# Patient Record
Sex: MALE | Race: WHITE | NOT HISPANIC OR LATINO | Employment: FULL TIME | URBAN - NONMETROPOLITAN AREA
[De-identification: names, ages, dates, MRNs, and addresses within clinical notes are randomized per-mention and may not be internally consistent; named-entity substitution may affect disease eponyms.]

---

## 2018-02-19 ENCOUNTER — OFFICE VISIT (OUTPATIENT)
Dept: URGENT CARE | Facility: CLINIC | Age: 38
End: 2018-02-19
Payer: COMMERCIAL

## 2018-02-19 VITALS
BODY MASS INDEX: 24.26 KG/M2 | WEIGHT: 189 LBS | DIASTOLIC BLOOD PRESSURE: 72 MMHG | TEMPERATURE: 99.8 F | RESPIRATION RATE: 18 BRPM | SYSTOLIC BLOOD PRESSURE: 122 MMHG | OXYGEN SATURATION: 96 % | HEIGHT: 74 IN

## 2018-02-19 DIAGNOSIS — J11.1 INFLUENZA: Primary | ICD-10-CM

## 2018-02-19 PROCEDURE — 99203 OFFICE O/P NEW LOW 30 MIN: CPT | Performed by: FAMILY MEDICINE

## 2018-02-19 PROCEDURE — S9088 SERVICES PROVIDED IN URGENT: HCPCS | Performed by: FAMILY MEDICINE

## 2018-02-19 RX ORDER — PREDNISONE 50 MG/1
50 TABLET ORAL DAILY
Qty: 5 TABLET | Refills: 0 | Status: SHIPPED | OUTPATIENT
Start: 2018-02-19 | End: 2018-02-24

## 2018-02-19 RX ORDER — OSELTAMIVIR PHOSPHATE 75 MG/1
75 CAPSULE ORAL EVERY 12 HOURS SCHEDULED
Qty: 10 CAPSULE | Refills: 0 | Status: SHIPPED | OUTPATIENT
Start: 2018-02-19 | End: 2018-02-24

## 2018-02-19 RX ORDER — METHYLPHENIDATE HYDROCHLORIDE 10 MG/1
5 TABLET ORAL
COMMUNITY
End: 2018-09-04 | Stop reason: ALTCHOICE

## 2018-02-19 RX ORDER — CARBAMAZEPINE 200 MG/1
200 TABLET ORAL 3 TIMES DAILY
COMMUNITY
End: 2018-09-04 | Stop reason: ALTCHOICE

## 2018-02-19 RX ORDER — FLUOXETINE 10 MG/1
10 CAPSULE ORAL DAILY
COMMUNITY
End: 2018-09-04 | Stop reason: ALTCHOICE

## 2018-02-19 NOTE — PROGRESS NOTES
[address] [employer]        NAME: Iliana Alamo is a 30 y o  male  : 1980    MRN: [id number]  DATE: 2018  TIME: 4:21 PM    Assessment and Plan   Influenza [J11 1]  1  Influenza  oseltamivir (TAMIFLU) 75 mg capsule    predniSONE 50 mg tablet         Patient Instructions     Tylenol and ibuprofen for fevers and body ache  Minimize exposure to the children  May not feel like going back to work for another 3-4 days  You should not return to work until you are fever free for 24 hours  Follow up with PCP in 3-5 days  Proceed to  ER if symptoms worsen  Chief Complaint     Chief Complaint   Patient presents with    Sore Throat     Started yesterday with sore throat nausea light headed and body aches  Patient stated prior to coming here he stopped at a gas station and when he walked in to the store he fell to the ground and blacked out for a second or two denies hitting head patient drove himself here alone  Kevin Swedish LPN          History of Present Illness   Iliana Alamo presents to the clinic c/o    Rapid onset of symptoms 2 days ago, worsening today  Sore Throat    This is a new problem  The current episode started yesterday  The problem has been gradually worsening  The maximum temperature recorded prior to his arrival was 102 - 102 9 F  The fever has been present for less than 1 day  Associated symptoms include coughing and swollen glands  Review of Systems   Review of Systems   Constitutional: Positive for chills, diaphoresis, fatigue and fever  HENT: Positive for sore throat  Eyes: Negative  Respiratory: Positive for cough  Cardiovascular: Negative  Gastrointestinal: Negative  Musculoskeletal: Negative            Current Medications       Current Outpatient Prescriptions:     FLUoxetine (PROzac) 10 mg capsule, Take 10 mg by mouth daily, Disp: , Rfl:     methylphenidate (RITALIN) 10 mg tablet, Take 5 mg by mouth 2 (two) times a day before breakfast and lunch, Disp: , Rfl:     oseltamivir (TAMIFLU) 75 mg capsule, Take 1 capsule (75 mg total) by mouth every 12 (twelve) hours for 5 days, Disp: 10 capsule, Rfl: 0    predniSONE 50 mg tablet, Take 1 tablet (50 mg total) by mouth daily for 5 days, Disp: 5 tablet, Rfl: 0    Current Allergies     Allergies as of 02/19/2018    (No Known Allergies)            The following portions of the patient's history were reviewed and updated as appropriate: allergies, current medications, past family history, past medical history, past social history, past surgical history and problem list     Objective   /72 (BP Location: Right arm, Patient Position: Sitting, Cuff Size: Standard)   Temp 99 8 °F (37 7 °C) (Tympanic)   Resp 18   Ht 6' 2" (1 88 m)   Wt 85 7 kg (189 lb)   SpO2 96%   BMI 24 27 kg/m²        Physical Exam     Physical Exam   Constitutional: He is oriented to person, place, and time  He appears well-developed  He has a sickly appearance  HENT:   Right Ear: External ear normal    Left Ear: External ear normal    Nose: Nose normal    Mouth/Throat: Oropharynx is clear and moist  No oropharyngeal exudate  Eyes: Conjunctivae are normal    Neck: Normal range of motion  Neck supple  Cardiovascular: Normal rate, regular rhythm and normal heart sounds  No murmur heard  Pulmonary/Chest: Effort normal and breath sounds normal  No respiratory distress  He has no wheezes  He has no rales  He exhibits no tenderness  Abdominal: Soft  He exhibits no distension and no mass  There is no tenderness  There is no rebound and no guarding  Musculoskeletal: Normal range of motion  Lymphadenopathy:     He has no cervical adenopathy  Neurological: He is alert and oriented to person, place, and time  No cranial nerve deficit  Skin: Skin is warm  No rash noted  No erythema

## 2018-02-19 NOTE — PATIENT INSTRUCTIONS
Tylenol and ibuprofen for fevers and body ache  Minimize exposure to the children  May not feel like going back to work for another 3-4 days  You should not return to work until you are fever free for 24 hours  Follow up with PCP in 3-5 days  Proceed to  ER if symptoms worsen  Influenza   AMBULATORY CARE:   Influenza  (the flu) is an infection caused by the influenza virus  The flu is easily spread when an infected person coughs, sneezes, or has close contact with others  You may be able to spread the flu to others for 1 week or longer after signs or symptoms appear  Common signs and symptoms include the following:   · Fever and chills    · Headaches, body aches, and muscle or joint pain    · Cough, runny nose, and sore throat    · Loss of appetite, nausea, vomiting, or diarrhea    · Tiredness    · Trouble breathing  Call 911 for any of the following:   · You have trouble breathing, and your lips look purple or blue  · You have a seizure  Seek care immediately if:   · You are dizzy, or you are urinating less or not at all  · You have a headache with a stiff neck, and you feel tired or confused  · You have new pain or pressure in your chest     · Your symptoms, such as shortness of breath, vomiting, or diarrhea, get worse  · Your symptoms, such as fever and coughing, seem to get better, but then get worse  Contact your healthcare provider if:   · You have new muscle pain or weakness  · You have questions or concerns about your condition or care  Treatment for influenza  may include any of the following:  · Acetaminophen  decreases pain and fever  It is available without a doctor's order  Ask how much to take and how often to take it  Follow directions  Acetaminophen can cause liver damage if not taken correctly  · NSAIDs , such as ibuprofen, help decrease swelling, pain, and fever  This medicine is available with or without a doctor's order   NSAIDs can cause stomach bleeding or kidney problems in certain people  If you take blood thinner medicine, always ask your healthcare provider if NSAIDs are safe for you  Always read the medicine label and follow directions  · Antivirals  help fight a viral infection  Manage your symptoms:   · Rest  as much as you can to help you recover  · Drink liquids as directed  to help prevent dehydration  Ask how much liquid to drink each day and which liquids are best for you  Prevent the spread of the flu:   · Wash your hands often  Use soap and water  Wash your hands after you use the bathroom, change a child's diapers, or sneeze  Wash your hands before you prepare or eat food  Use gel hand cleanser when soap and water are not available  Do not touch your eyes, nose, or mouth unless you have washed your hands first            · Cover your mouth when you sneeze or cough  Cough into a tissue or the bend of your arm  · Clean shared items with a germ-killing   Clean table surfaces, doorknobs, and light switches  Do not share towels, silverware, and dishes with people who are sick  Wash bed sheets, towels, silverware, and dishes with soap and water  · Wear a mask  over your mouth and nose if you are sick or are near anyone who is sick  · Stay away from others  if you are sick  · Influenza vaccine  helps prevent influenza (flu)  Everyone older than 6 months should get a yearly influenza vaccine  Get the vaccine as soon as it is available, usually in September or October each year  Follow up with your healthcare provider as directed:  Write down your questions so you remember to ask them during your visits  © 2017 [address] Information is for End User's use only and may not be sold, redistributed or otherwise used for commercial purposes  All illustrations and images included in CareNotes® are the copyrighted property of [employer]  or [employer]  The above information is an  only   It is not intended as medical advice for individual conditions or treatments  Talk to your doctor, nurse or pharmacist before following any medical regimen to see if it is safe and effective for you

## 2018-02-19 NOTE — LETTER
February 19, 2018     Patient: More Sifuentes   YOB: 1980   Date of Visit: 2/19/2018       To Whom it May Concern:    Mariano Henri was seen in my clinic on 2/19/2018  He may return to work on 2/22/2018  If you have any questions or concerns, please don't hesitate to call           Sincerely,          Mary Kate Saldaña DO        CC: No Recipients

## 2018-04-09 ENCOUNTER — APPOINTMENT (EMERGENCY)
Dept: CT IMAGING | Facility: HOSPITAL | Age: 38
End: 2018-04-09
Payer: COMMERCIAL

## 2018-04-09 ENCOUNTER — APPOINTMENT (EMERGENCY)
Dept: RADIOLOGY | Facility: HOSPITAL | Age: 38
End: 2018-04-09
Payer: COMMERCIAL

## 2018-04-09 ENCOUNTER — HOSPITAL ENCOUNTER (EMERGENCY)
Facility: HOSPITAL | Age: 38
Discharge: HOME | End: 2018-04-09
Attending: EMERGENCY MEDICINE | Admitting: EMERGENCY MEDICINE
Payer: COMMERCIAL

## 2018-04-09 VITALS
BODY MASS INDEX: 24.38 KG/M2 | HEART RATE: 82 BPM | HEIGHT: 74 IN | SYSTOLIC BLOOD PRESSURE: 97 MMHG | OXYGEN SATURATION: 100 % | RESPIRATION RATE: 20 BRPM | TEMPERATURE: 98.5 F | WEIGHT: 190 LBS | DIASTOLIC BLOOD PRESSURE: 50 MMHG

## 2018-04-09 DIAGNOSIS — M54.6 ACUTE BILATERAL THORACIC BACK PAIN: Primary | ICD-10-CM

## 2018-04-09 DIAGNOSIS — R19.7 DIARRHEA IN ADULT PATIENT: ICD-10-CM

## 2018-04-09 LAB
ALBUMIN SERPL BCP-MCNC: 4 G/DL (ref 3.5–5)
ALP SERPL-CCNC: 83 U/L (ref 46–116)
ALT SERPL W P-5'-P-CCNC: 25 U/L (ref 12–78)
ANION GAP SERPL CALCULATED.3IONS-SCNC: 5 MMOL/L (ref 4–13)
AST SERPL W P-5'-P-CCNC: 17 U/L (ref 5–45)
BASOPHILS # BLD AUTO: 0.01 THOUSANDS/ΜL (ref 0–0.1)
BASOPHILS NFR BLD AUTO: 0 % (ref 0–1)
BILIRUB SERPL-MCNC: 0.4 MG/DL (ref 0.2–1)
BILIRUB UR QL STRIP: NEGATIVE
BUN SERPL-MCNC: 15 MG/DL (ref 5–25)
CALCIUM SERPL-MCNC: 9.3 MG/DL
CHLORIDE SERPL-SCNC: 103 MMOL/L (ref 100–108)
CLARITY UR: NORMAL
CO2 SERPL-SCNC: 31 MMOL/L (ref 21–32)
COLOR UR: YELLOW
CREAT SERPL-MCNC: 1.15 MG/DL (ref 0.6–1.3)
EOSINOPHIL # BLD AUTO: 0.23 THOUSAND/ΜL (ref 0–0.61)
EOSINOPHIL NFR BLD AUTO: 2 % (ref 0–6)
ERYTHROCYTE [DISTWIDTH] IN BLOOD BY AUTOMATED COUNT: 12.4 % (ref 11.6–15.1)
GFR SERPL CREATININE-BSD FRML MDRD: 81 ML/MIN/1.73SQ M
GLUCOSE SERPL-MCNC: 95 MG/DL (ref 65–140)
GLUCOSE UR STRIP-MCNC: NEGATIVE MG/DL
HCT VFR BLD AUTO: 47.6 % (ref 36.5–49.3)
HGB BLD-MCNC: 16.5 G/DL (ref 12–17)
HGB UR QL STRIP.AUTO: NEGATIVE
KETONES UR STRIP-MCNC: NEGATIVE MG/DL
LEUKOCYTE ESTERASE UR QL STRIP: NEGATIVE
LIPASE SERPL-CCNC: 109 U/L (ref 73–393)
LYMPHOCYTES # BLD AUTO: 1.15 THOUSANDS/ΜL (ref 0.6–4.47)
LYMPHOCYTES NFR BLD AUTO: 8 % (ref 14–44)
MAGNESIUM SERPL-MCNC: 2 MG/DL (ref 1.6–2.6)
MCH RBC QN AUTO: 30.6 PG (ref 26.8–34.3)
MCHC RBC AUTO-ENTMCNC: 34.7 G/DL (ref 31.4–37.4)
MCV RBC AUTO: 88 FL (ref 82–98)
MONOCYTES # BLD AUTO: 0.56 THOUSAND/ΜL (ref 0.17–1.22)
MONOCYTES NFR BLD AUTO: 4 % (ref 4–12)
NEUTROPHILS # BLD AUTO: 12.77 THOUSANDS/ΜL (ref 1.85–7.62)
NEUTS SEG NFR BLD AUTO: 86 % (ref 43–75)
NITRITE UR QL STRIP: NEGATIVE
PH UR STRIP.AUTO: 7 [PH] (ref 4.5–8)
PLATELET # BLD AUTO: 250 THOUSANDS/UL (ref 149–390)
PMV BLD AUTO: 10.4 FL (ref 8.9–12.7)
POTASSIUM SERPL-SCNC: 4.6 MMOL/L (ref 3.5–5.3)
PROT SERPL-MCNC: 8.2 G/DL (ref 6.4–8.2)
PROT UR STRIP-MCNC: NEGATIVE MG/DL
RBC # BLD AUTO: 5.39 MILLION/UL (ref 3.88–5.62)
SODIUM SERPL-SCNC: 139 MMOL/L (ref 136–145)
SP GR UR STRIP.AUTO: 1.01 (ref 1–1.03)
TROPONIN I SERPL-MCNC: <0.02 NG/ML
UROBILINOGEN UR QL STRIP.AUTO: 0.2 E.U./DL
WBC # BLD AUTO: 14.72 THOUSAND/UL (ref 4.31–10.16)

## 2018-04-09 PROCEDURE — 71046 X-RAY EXAM CHEST 2 VIEWS: CPT

## 2018-04-09 PROCEDURE — 84484 ASSAY OF TROPONIN QUANT: CPT | Performed by: EMERGENCY MEDICINE

## 2018-04-09 PROCEDURE — 83690 ASSAY OF LIPASE: CPT | Performed by: EMERGENCY MEDICINE

## 2018-04-09 PROCEDURE — 99284 EMERGENCY DEPT VISIT MOD MDM: CPT

## 2018-04-09 PROCEDURE — 93005 ELECTROCARDIOGRAM TRACING: CPT

## 2018-04-09 PROCEDURE — 36415 COLL VENOUS BLD VENIPUNCTURE: CPT | Performed by: EMERGENCY MEDICINE

## 2018-04-09 PROCEDURE — 83735 ASSAY OF MAGNESIUM: CPT | Performed by: EMERGENCY MEDICINE

## 2018-04-09 PROCEDURE — 96374 THER/PROPH/DIAG INJ IV PUSH: CPT

## 2018-04-09 PROCEDURE — 85025 COMPLETE CBC W/AUTO DIFF WBC: CPT | Performed by: EMERGENCY MEDICINE

## 2018-04-09 PROCEDURE — 80053 COMPREHEN METABOLIC PANEL: CPT | Performed by: EMERGENCY MEDICINE

## 2018-04-09 PROCEDURE — 96375 TX/PRO/DX INJ NEW DRUG ADDON: CPT

## 2018-04-09 PROCEDURE — 74177 CT ABD & PELVIS W/CONTRAST: CPT

## 2018-04-09 PROCEDURE — 81003 URINALYSIS AUTO W/O SCOPE: CPT | Performed by: EMERGENCY MEDICINE

## 2018-04-09 PROCEDURE — 96361 HYDRATE IV INFUSION ADD-ON: CPT

## 2018-04-09 RX ORDER — FAMOTIDINE 20 MG/1
20 TABLET, FILM COATED ORAL 2 TIMES DAILY
Qty: 30 TABLET | Refills: 0 | Status: SHIPPED | OUTPATIENT
Start: 2018-04-09 | End: 2018-09-04 | Stop reason: ALTCHOICE

## 2018-04-09 RX ORDER — KETOROLAC TROMETHAMINE 30 MG/ML
10 INJECTION, SOLUTION INTRAMUSCULAR; INTRAVENOUS ONCE
Status: COMPLETED | OUTPATIENT
Start: 2018-04-09 | End: 2018-04-09

## 2018-04-09 RX ORDER — ONDANSETRON 2 MG/ML
4 INJECTION INTRAMUSCULAR; INTRAVENOUS ONCE
Status: COMPLETED | OUTPATIENT
Start: 2018-04-09 | End: 2018-04-09

## 2018-04-09 RX ORDER — ONDANSETRON 8 MG/1
8 TABLET, ORALLY DISINTEGRATING ORAL EVERY 8 HOURS PRN
Qty: 20 TABLET | Refills: 0 | Status: SHIPPED | OUTPATIENT
Start: 2018-04-09 | End: 2018-09-04 | Stop reason: ALTCHOICE

## 2018-04-09 RX ADMIN — FAMOTIDINE 20 MG: 10 INJECTION INTRAVENOUS at 12:00

## 2018-04-09 RX ADMIN — SODIUM CHLORIDE 1000 ML: 0.9 INJECTION, SOLUTION INTRAVENOUS at 11:56

## 2018-04-09 RX ADMIN — KETOROLAC TROMETHAMINE 9.9 MG: 30 INJECTION, SOLUTION INTRAMUSCULAR at 11:58

## 2018-04-09 RX ADMIN — IOHEXOL 100 ML: 350 INJECTION, SOLUTION INTRAVENOUS at 13:03

## 2018-04-09 RX ADMIN — ONDANSETRON 4 MG: 2 INJECTION INTRAMUSCULAR; INTRAVENOUS at 11:56

## 2018-04-09 NOTE — DISCHARGE INSTRUCTIONS
Thoracic Pain   WHAT YOU NEED TO KNOW:   Thoracic pain is discomfort in any area between your neck and your abdomen  Thoracic pain may be caused by health conditions that affect your gastrointestinal system, lungs, bones, or muscles  It can also be caused by trauma, panic attacks, or anxiety related to stress  DISCHARGE INSTRUCTIONS:   Return to the emergency department if:   · You have a period of thoracic pain that lasts longer than 5 minutes  · Your thoracic pain gets worse  · You have a history of angina (pressure or squeezing chest pain) and your usual medicine does not work  · You have thoracic pain with shortness of breath, sweating, dizziness, vomiting, or nausea  · You have thoracic pain that spreads to your arm, neck, back, jaw, or stomach  Contact your healthcare provider or specialist if:   · Your thoracic pain is not relieved by resting, heat, or medicines  · You have questions or concerns about your condition or care  Medicines: You may need any of the following:  · Acetaminophen  decreases pain  It is available without a prescription  Ask how much to take and how often to take it  Follow directions  Acetaminophen can cause liver damage if not taken correctly  · NSAIDs , such as ibuprofen, help decrease swelling, pain, and fever  This medicine is available with or without a doctor's order  NSAIDs can cause stomach bleeding or kidney problems in certain people  If you take blood thinner medicine, always ask if NSAIDs are safe for you  Always read the medicine label and follow directions  Do not give these medicines to children under 10months of age without direction from your child's healthcare provider  · Take your medicine as directed  Contact your healthcare provider if you think your medicine is not helping or if you have side effects  Tell him of her if you are allergic to any medicine  Keep a list of the medicines, vitamins, and herbs you take   Include the amounts, and when and why you take them  Bring the list or the pill bottles to follow-up visits  Carry your medicine list with you in case of an emergency  Follow up with your healthcare provider or specialist as directed:  Write down your questions so you remember to ask them during your visits  Self-care:   · Apply heat  to the area  Heat helps decrease pain and muscle spasms  Apply heat on the area for 20 to 30 minutes every 2 hours for as many days as directed  · Limit physical activity that causes pain  Rest as needed  Ask your healthcare provider how long you should limit activity  © 2017 [address] [facility] Information is for End User's use only and may not be sold, redistributed or otherwise used for commercial purposes  All illustrations and images included in CareNotes® are the copyrighted property of A D A M [employer]  or [employer] Fel[employer]  The above information is an  only  It is not intended as medical advice for individual conditions or treatments  Talk to your doctor, nurse or pharmacist before following any medical regimen to see if it is safe and effective for you  Acute Diarrhea   WHAT YOU NEED TO KNOW:   Acute diarrhea starts quickly and lasts a short time, usually 1 to 3 days  It can last up to 2 weeks  You may not be able to control your diarrhea  Acute diarrhea usually stops on its own  DISCHARGE INSTRUCTIONS:   Return to the emergency department if:   · You feel confused  · Your heartbeat is faster than normal      · Your eyes look deeply sunken, or you have no tears when you cry  · You urinate less than usual, or your urine is dark yellow  · You have blood or mucus in your stools  · You have severe abdominal pain  · You are unable to drink any liquids  Contact your healthcare provider if:   · Your symptoms do not get better with treatment  · You have a fever higher than 101 3°F (38 5°C)       · You have trouble eating and drinking because you are vomiting  · You are thirsty or have a dry mouth  · Your diarrhea does not get better in 7 days  · You have questions or concerns about your condition or care  Follow up with your healthcare provider as directed:  Write down your questions so you remember to ask them during your visits  Medicines:  · Diarrhea medicine  is an over-the-counter medicine that helps slow or stop your diarrhea  If you take other medicines, talk to your healthcare provider before you take diarrhea medicine  · Antibiotics  may be given to help treat an infection caused by bacteria  · Antiparasitics  may be given to treat an infection caused by parasites  · Take your medicine as directed  Contact your healthcare provider if you think your medicine is not helping or if you have side effects  Tell him of her if you are allergic to any medicine  Keep a list of the medicines, vitamins, and herbs you take  Include the amounts, and when and why you take them  Bring the list or the pill bottles to follow-up visits  Carry your medicine list with you in case of an emergency  Self-care:   · Drink liquids as directed  Liquids will help prevent dehydration caused by diarrhea  Ask your healthcare provider how much liquid to drink each day and which liquids are best for you  You may need to drink an oral rehydration solution (ORS)  An ORS has the right amounts of water, salts, and sugar you need to replace body fluids  You can buy an ORS at most grocery stores and pharmacies  · Eat foods that are easy to digest   Examples include rice, lentils, cereal, bananas, potatoes, and bread  It also includes some fruits (bananas, melon), well-cooked vegetables, and lean meats  Avoid foods high in fiber, fat, and sugar  Also avoid caffeine, alcohol, dairy, and red meat until your diarrhea is gone  Prevent acute diarrhea:   · Wash your hands often  Use soap and water   Wash your hands before you eat or prepare food  Also wash your hands after you use the bathroom  Use an alcohol-based hand gel when soap and water are not available  · Keep bathroom surfaces clean  This helps prevent the spread of germs that cause acute diarrhea  · Wash fruits and vegetables well before you eat them  This can help remove germs that cause diarrhea  If possible, remove the skin from fruits and vegetables, or cook them well before you eat them  · Cook meat as directed  ¨ Cook ground meat  to 160°F      ¨ Cook ground poultry, whole poultry, or cuts of poultry  to at least 165°F  Remove the meat from heat  Let it stand for 3 minutes before you eat it  ¨ Cook whole cuts of meat other than poultry  to at least 145°F  Remove the meat from heat  Let it stand for 3 minutes before you eat it  · Wash dishes that have touched raw meat with hot water and soap  This includes cutting boards, utensils, dishes, and serving containers  · Place raw or cooked meat in the refrigerator as soon as possible  Bacteria can grow in meat that is left at room temperature too long  · Do not eat raw or undercooked oysters, clams, or mussels  These foods may be contaminated and cause infection  · Drink filtered or treated water only when you travel  Do not put ice in your drinks  Drink bottled water whenever possible  © 2017 [facility] Information is for End User's use only and may not be sold, redistributed or otherwise used for commercial purposes  All illustrations and images included in CareNotes® are the copyrighted property of A D A M [employer] Inc  or [employer] Felipe  The above information is an  only  It is not intended as medical advice for individual conditions or treatments  Talk to your doctor, nurse or pharmacist before following any medical regimen to see if it is safe and effective for you

## 2018-04-09 NOTE — ED PROVIDER NOTES
History  Chief Complaint   Patient presents with    Back Pain     Upper back pain and diarrhea since yesterday     This is a 45-year-old male with the noted past medical history who presents to emergency department with approximately 16 hour history of atraumatic mid thoracic back and epigastric pain described as sharp/stabbing pain localized to the area without radiation beginning at rest yesterday and persisting continuously since time of onset  Patient also notes multiple episodes of liquid/nonbloody stool that have begun since the onset of pain yesterday; passage of stool does not affect his back or epigastric pain  He does have significant nausea associated with the pain although no active vomiting  Pain in thorax seems to be particularly worse with movement as well as direct palpation of the area  Symptoms are otherwise not associated with fever/chills/chest pain/dyspnea/cough/wheeze/hematuria/dysuria/urgency/frequency/flank pain  He was otherwise in normal state of health until onset of symptoms; he does not have any previous history of similar symptoms  Does not have any history of GI/thoracic/spinal surgery or disorder previously  No travel in past 30 days  No sick contacts in past 30 days  No antibiotic use in past 30 days  He has taken several ibuprofen without improvement of symptoms  Patient does have a previous history alcoholic hepatitis 5 years prior that resolved with attention from alcohol  Patient states he drinks occasionally and last alcohol consumed was 9 days prior  He does smoke; he denies the use of any recreational drugs  DDx includes but is not limited to:  Colitis, enteritis, cholecystitis, pancreatitis, GERD, gastritis, thoracic spine strain, pneumonia, pneumothorax, pleural effusion  Symptoms are not suspicious for and I do not suspect an acute fracture or spinal infection or space-occupying lesion    Will obtain chest x-ray/CT abdomen pelvis in addition to workup with labs and urinalysis  History provided by:  Patient  Back Pain   Associated symptoms: abdominal pain    Associated symptoms: no chest pain, no dysuria, no fever, no headaches, no numbness and no weakness        Prior to Admission Medications   Prescriptions Last Dose Informant Patient Reported? Taking? FLUoxetine (PROzac) 10 mg capsule More than a month at Unknown time  Yes No   Sig: Take 10 mg by mouth daily   carBAMazepine (TEGretol) 200 mg tablet More than a month at Unknown time  Yes No   Sig: Take 200 mg by mouth 3 (three) times a day   methylphenidate (RITALIN) 10 mg tablet More than a month at Unknown time Self Yes No   Sig: Take 5 mg by mouth 2 (two) times a day before breakfast and lunch      Facility-Administered Medications: None       History reviewed  No pertinent past medical history  History reviewed  No pertinent surgical history  History reviewed  No pertinent family history  I have reviewed and agree with the history as documented  Social History   Substance Use Topics    Smoking status: Current Every Day Smoker    Smokeless tobacco: Never Used    Alcohol use Yes      Comment: socially        Review of Systems   Constitutional: Positive for fatigue  Negative for chills and fever  Respiratory: Negative for cough and shortness of breath  Cardiovascular: Negative for chest pain and palpitations  Gastrointestinal: Positive for abdominal pain, diarrhea and nausea  Negative for vomiting  Genitourinary: Negative for difficulty urinating, dysuria, flank pain and hematuria  Musculoskeletal: Positive for back pain  Negative for myalgias, neck pain and neck stiffness  Skin: Negative for color change, pallor, rash and wound  Neurological: Negative for dizziness, weakness, numbness and headaches  Hematological: Negative for adenopathy  Does not bruise/bleed easily  Psychiatric/Behavioral: Negative for confusion     All other systems reviewed and are negative  Physical Exam  ED Triage Vitals [04/09/18 1121]   Temperature Pulse Respirations Blood Pressure SpO2   98 5 °F (36 9 °C) 82 18 128/73 97 %      Temp Source Heart Rate Source Patient Position - Orthostatic VS BP Location FiO2 (%)   Temporal Monitor Lying Right arm --      Pain Score       Worst Possible Pain           Orthostatic Vital Signs  Vitals:    04/09/18 1145 04/09/18 1200 04/09/18 1230 04/09/18 1330   BP: 116/78 129/81 112/60 97/50   Pulse: 82 82 85 82   Patient Position - Orthostatic VS: Lying Lying Lying Lying       Physical Exam   Constitutional: He is oriented to person, place, and time  He appears well-developed and well-nourished  He is cooperative  He appears distressed (moderate painful distress)  HENT:   Head: Normocephalic and atraumatic  Right Ear: Hearing and external ear normal    Left Ear: Hearing and external ear normal    Nose: Nose normal    Mouth/Throat: Uvula is midline, oropharynx is clear and moist and mucous membranes are normal  No oropharyngeal exudate  Neck: Trachea normal, normal range of motion and phonation normal  Neck supple  No JVD present  No tracheal tenderness, no spinous process tenderness and no muscular tenderness present  No tracheal deviation present  No thyroid mass and no thyromegaly present  Cardiovascular: Normal rate, regular rhythm, S1 normal, S2 normal, normal heart sounds and intact distal pulses  Exam reveals no gallop and no friction rub  No murmur heard  Pulses:       Radial pulses are 2+ on the right side, and 2+ on the left side  Dorsalis pedis pulses are 2+ on the right side, and 2+ on the left side  Posterior tibial pulses are 2+ on the right side, and 2+ on the left side  Pulmonary/Chest: Effort normal and breath sounds normal  No stridor  No respiratory distress  He has no decreased breath sounds  He has no wheezes  He has no rhonchi  He has no rales  He exhibits no tenderness  Abdominal: Soft   He exhibits no distension and no mass  There is tenderness in the epigastric area  There is no rigidity, no rebound, no guarding and no CVA tenderness  Musculoskeletal: Normal range of motion  He exhibits no edema or deformity  Cervical back: Normal         Thoracic back: He exhibits tenderness (Band-like ttp at approx T9 level bilaterally without overlying skin change/deformity )  He exhibits normal range of motion, no bony tenderness, no swelling, no edema, no deformity, no laceration, no pain and no spasm  Lumbar back: Normal         Back:    Lymphadenopathy:     He has no cervical adenopathy  Neurological: He is alert and oriented to person, place, and time  GCS eye subscore is 4  GCS verbal subscore is 5  GCS motor subscore is 6  Skin: Skin is warm, dry and intact  No rash noted  No erythema  Psychiatric: He has a normal mood and affect  His speech is normal and behavior is normal    Nursing note and vitals reviewed  ED Medications  Medications   sodium chloride 0 9 % bolus 1,000 mL (0 mL Intravenous Stopped 4/9/18 1338)   ondansetron (ZOFRAN) injection 4 mg (4 mg Intravenous Given 4/9/18 1156)   ketorolac (TORADOL) injection 9 9 mg (9 9 mg Intravenous Given 4/9/18 1158)   famotidine (PEPCID) injection 20 mg (20 mg Intravenous Given 4/9/18 1200)   iohexol (OMNIPAQUE) 350 MG/ML injection (SINGLE-DOSE) 100 mL (100 mL Intravenous Given 4/9/18 1303)       Diagnostic Studies  Results Reviewed     Procedure Component Value Units Date/Time    [place] draw [[id number]] Collected:  04/09/18 1159    Lab Status: In process Specimen:  Blood Updated:  04/09/18 1401    Narrative: The following orders were created for panel order [place] draw    Procedure                               Abnormality         Status                     ---------                               -----------         ------                     Adriane Desir on CRSY[[id number]]                            Final result               [id number]                                  In process                   Please view results for these tests on the individual orders  UA w Reflex to Microscopic w Reflex to Culture [[id number]]  (Normal) Collected:  04/09/18 1200    Lab Status:  Final result Specimen:  Urine from Urine, Clean Catch Updated:  04/09/18 1312     Color, UA Yellow     Clarity, UA Slightly Cloudy     Specific Gravity, UA 1 015     pH, UA 7 0     Leukocytes, UA Negative     Nitrite, UA Negative     Protein, UA Negative mg/dl      Glucose, UA Negative mg/dl      Ketones, UA Negative mg/dl      Urobilinogen, UA 0 2 E U /dl      Bilirubin, UA Negative     Blood, UA Negative    Troponin I [[id number]]  (Normal) Collected:  04/09/18 1159    Lab Status:  Final result Specimen:  Blood from Arm, Right Updated:  04/09/18 1225     Troponin I <0 02 ng/mL     Narrative:         [employer] Chemistry analyzer 99% cutoff is > 0 04 ng/mL in network labs    o cTnI 99% cutoff is useful only when applied to patients in the clinical setting of myocardial ischemia  o cTnI 99% cutoff should be interpreted in the context of clinical history, ECG findings and possibly cardiac imaging to establish correct diagnosis  o cTnI 99% cutoff may be suggestive but clearly not indicative of a coronary event without the clinical setting of myocardial ischemia      CMP [[id number]] Collected:  04/09/18 1159    Lab Status:  Final result Specimen:  Blood from Arm, Right Updated:  04/09/18 1223     Sodium 139 mmol/L      Potassium 4 6 mmol/L      Chloride 103 mmol/L      CO2 31 mmol/L      Anion Gap 5 mmol/L      BUN 15 mg/dL      Creatinine 1 15 mg/dL      Glucose 95 mg/dL      Calcium 9 3 mg/dL      AST 17 U/L      ALT 25 U/L      Alkaline Phosphatase 83 U/L      Total Protein 8 2 g/dL      Albumin 4 0 g/dL      Total Bilirubin 0 40 mg/dL      eGFR 81 ml/min/1 73sq m     Narrative:         National Kidney Disease Education Program recommendations are as follows:  GFR calculation is accurate only with a steady state creatinine  Chronic Kidney disease less than 60 ml/min/1 73 sq  meters  Kidney failure less than 15 ml/min/1 73 sq  meters  Lipase [[id number]]  (Normal) Collected:  04/09/18 1159    Lab Status:  Final result Specimen:  Blood from Arm, Right Updated:  04/09/18 1223     Lipase 109 u/L     Magnesium [[id number]]  (Normal) Collected:  04/09/18 1159    Lab Status:  Final result Specimen:  Blood from Arm, Right Updated:  04/09/18 1223     Magnesium 2 0 mg/dL     CBC and differential [[id number]]  (Abnormal) Collected:  04/09/18 1159    Lab Status:  Final result Specimen:  Blood from Arm, Right Updated:  04/09/18 1206     WBC 14 72 (H) Thousand/uL      RBC 5 39 Million/uL      Hemoglobin 16 5 g/dL      Hematocrit 47 6 %      MCV 88 fL      MCH 30 6 pg      MCHC 34 7 g/dL      RDW 12 4 %      MPV 10 4 fL      Platelets 486 Thousands/uL      Neutrophils Relative 86 (H) %      Lymphocytes Relative 8 (L) %      Monocytes Relative 4 %      Eosinophils Relative 2 %      Basophils Relative 0 %      Neutrophils Absolute 12 77 (H) Thousands/µL      Lymphocytes Absolute 1 15 Thousands/µL      Monocytes Absolute 0 56 Thousand/µL      Eosinophils Absolute 0 23 Thousand/µL      Basophils Absolute 0 01 Thousands/µL                  CT abdomen pelvis with contrast   Final Result by NAOMI Boo MD (04/09 2992)      No findings to explain the patient's epigastric pain  Partially filled stomach and contracted gallbladder suggest recent meal ingestion (correlation with the clinical history recommended in this regard)  Workstation performed: [id number]         XR chest 2 views   Final Result by NAOMI Boo MD (04/09 4788)      No acute cardiopulmonary disease              Workstation performed: [id number]                    Procedures  ECG 12 Lead Documentation  Date/Time: 4/9/2018 12:05 PM  Performed by: Neo Chavis  Authorized by: Neo Chavis     Indications / Diagnosis:  Back/epigastric pain  ECG reviewed by me, the ED Provider: yes    Patient location:  ED  Previous ECG:     Comparison to cardiac monitor: Yes    Interpretation:     Interpretation: normal    Rate:     ECG rate:  84    ECG rate assessment: normal    Rhythm:     Rhythm: sinus rhythm    Ectopy:     Ectopy: none    QRS:     QRS axis:  Normal    QRS intervals:  Normal  Conduction:     Conduction: normal    ST segments:     ST segments:  Normal  T waves:     T waves: normal    Comments:      Pr 148 qrs 92 qtc 432           Phone Contacts  ED Phone Contact    ED Course  ED Course as of Apr 09 1746 Mon Apr 09, 2018   1226 1  WBC elevated c/w infectious/inflammatory process  2  Hg/Hct wnl   3  Plt wnl   4  Electrolytes wnl   5  Troponin negative  6  Transaminases wnl   7  Lipase wnl   8  CXR/CT a/p pending  1340 UA resulted and wnl  MDM  Number of Diagnoses or Management Options  Acute bilateral thoracic back pain: new and requires workup  Diarrhea in adult patient: new and requires workup     Amount and/or Complexity of Data Reviewed  Clinical lab tests: reviewed and ordered  Tests in the radiology section of CPT®: ordered and reviewed  Decide to obtain previous medical records or to obtain history from someone other than the patient: yes  Review and summarize past medical records: yes  Independent visualization of images, tracings, or specimens: yes    Risk of Complications, Morbidity, and/or Mortality  Presenting problems: high  Diagnostic procedures: high  Management options: moderate  General comments: Workup entirely unrevealing as to the etiology of patient's symptoms but no evidence of ACS/pneumothorax/pneumonia/acute spinal fracture/pancreatitis/hepatitis/colitis/enteritis  Patient will require close follow-up to his primary physician for further evaluation; patient does have a primary physician assigned by his insurance although he states he has never made an appointment    Will treat symptomatically with high potency NSAID for symptom control; there may be some component of GERD/gastritis and I will also prescribe him from famotidine and ondansetron as well  ED return for GI bleeding/fever/syncope/chest pain  All questions answered prior to discharge  The patient expressed understanding and agreed to plan  Patient Progress  Patient progress: stable    CritCare Time    Disposition  Final diagnoses:   Acute bilateral thoracic back pain   Diarrhea in adult patient     Time reflects when diagnosis was documented in both MDM as applicable and the Disposition within this note     Time User Action Codes Description Comment    4/9/2018  1:57 PM Erika Aguayo Add [M54 6] Acute bilateral thoracic back pain     4/9/2018  1:57 PM Erika Aguayo Add [R19 7] Diarrhea in adult patient       ED Disposition     ED Disposition Condition Comment    Discharge  Neetu Zambrano discharge to home/self care  Condition at discharge: Stable        Follow-up Information     Follow up With Specialties Details Why Contact Info Additional Information    Your docotr  Call today For an appointment for further evaluation      [facility] Emergency Department Emergency Medicine Go to If you develop fever, severe difficulty breathing, weakness or numbness in either of your legs, or chest pain   [address] [facility] ED, [address], [address]        Discharge Medication List as of 4/9/2018  2:00 PM      START taking these medications    Details   diclofenac sodium (VOLTAREN) 50 mg EC tablet Take 1 tablet (50 mg total) by mouth 2 (two) times a day as needed (pain), Starting Mon 4/9/2018, Normal      famotidine (PEPCID) 20 mg tablet Take 1 tablet (20 mg total) by mouth 2 (two) times a day, Starting Mon 4/9/2018, Normal      ondansetron (ZOFRAN-ODT) 8 mg disintegrating tablet Take 1 tablet (8 mg total) by mouth every 8 (eight) hours as needed for nausea or vomiting, Starting Mon 4/9/2018, Normal         CONTINUE these medications which have NOT CHANGED    Details   carBAMazepine (TEGretol) 200 mg tablet Take 200 mg by mouth 3 (three) times a day, Historical Med      FLUoxetine (PROzac) 10 mg capsule Take 10 mg by mouth daily, Historical Med      methylphenidate (RITALIN) 10 mg tablet Take 5 mg by mouth 2 (two) times a day before breakfast and lunch, Historical Med           No discharge procedures on file      ED Provider  Electronically Signed by           Maral Romeo DO  04/09/18 8193

## 2018-04-10 LAB
ATRIAL RATE: 84 BPM
P AXIS: 46 DEGREES
PR INTERVAL: 148 MS
QRS AXIS: 40 DEGREES
QRSD INTERVAL: 92 MS
QT INTERVAL: 366 MS
QTC INTERVAL: 432 MS
T WAVE AXIS: 50 DEGREES
VENTRICULAR RATE: 84 BPM

## 2018-04-10 PROCEDURE — 93010 ELECTROCARDIOGRAM REPORT: CPT | Performed by: INTERNAL MEDICINE

## 2018-09-04 ENCOUNTER — HOSPITAL ENCOUNTER (OUTPATIENT)
Facility: HOSPITAL | Age: 38
Setting detail: OBSERVATION
Discharge: HOME | End: 2018-09-05
Attending: EMERGENCY MEDICINE | Admitting: INTERNAL MEDICINE
Payer: COMMERCIAL

## 2018-09-04 ENCOUNTER — APPOINTMENT (EMERGENCY)
Dept: CT IMAGING | Facility: HOSPITAL | Age: 38
End: 2018-09-04
Payer: COMMERCIAL

## 2018-09-04 DIAGNOSIS — J02.9 PHARYNGITIS: ICD-10-CM

## 2018-09-04 DIAGNOSIS — A41.9 SEPSIS (HCC): Primary | ICD-10-CM

## 2018-09-04 DIAGNOSIS — R50.9 FEVER AND CHILLS: ICD-10-CM

## 2018-09-04 DIAGNOSIS — D72.829 LEUKOCYTOSIS: ICD-10-CM

## 2018-09-04 DIAGNOSIS — R53.1 WEAKNESS: ICD-10-CM

## 2018-09-04 LAB
ALBUMIN SERPL BCP-MCNC: 4 G/DL (ref 3.5–5)
ALP SERPL-CCNC: 88 U/L (ref 46–116)
ALT SERPL W P-5'-P-CCNC: 21 U/L (ref 12–78)
ANION GAP SERPL CALCULATED.3IONS-SCNC: 8 MMOL/L (ref 4–13)
APTT PPP: 35 SECONDS (ref 24–36)
AST SERPL W P-5'-P-CCNC: 14 U/L (ref 5–45)
BASOPHILS # BLD AUTO: 0.05 THOUSANDS/ΜL (ref 0–0.1)
BASOPHILS NFR BLD AUTO: 0 % (ref 0–1)
BILIRUB SERPL-MCNC: 0.3 MG/DL (ref 0.2–1)
BILIRUB UR QL STRIP: NEGATIVE
BUN SERPL-MCNC: 13 MG/DL (ref 5–25)
CALCIUM SERPL-MCNC: 9.3 MG/DL (ref 8.3–10.1)
CHLORIDE SERPL-SCNC: 100 MMOL/L (ref 100–108)
CLARITY UR: ABNORMAL
CO2 SERPL-SCNC: 28 MMOL/L (ref 21–32)
COLOR UR: YELLOW
CREAT SERPL-MCNC: 1.07 MG/DL (ref 0.6–1.3)
EOSINOPHIL # BLD AUTO: 0.05 THOUSAND/ΜL (ref 0–0.61)
EOSINOPHIL NFR BLD AUTO: 0 % (ref 0–6)
ERYTHROCYTE [DISTWIDTH] IN BLOOD BY AUTOMATED COUNT: 12.2 % (ref 11.6–15.1)
GFR SERPL CREATININE-BSD FRML MDRD: 89 ML/MIN/1.73SQ M
GLUCOSE SERPL-MCNC: 101 MG/DL (ref 65–140)
GLUCOSE UR STRIP-MCNC: NEGATIVE MG/DL
HCT VFR BLD AUTO: 43.3 % (ref 36.5–49.3)
HGB BLD-MCNC: 14.8 G/DL (ref 12–17)
HGB UR QL STRIP.AUTO: NEGATIVE
IMM GRANULOCYTES # BLD AUTO: 0.1 THOUSAND/UL (ref 0–0.2)
IMM GRANULOCYTES NFR BLD AUTO: 1 % (ref 0–2)
INR PPP: 1.02 (ref 0.86–1.17)
KETONES UR STRIP-MCNC: NEGATIVE MG/DL
LACTATE SERPL-SCNC: 1.7 MMOL/L (ref 0.5–2)
LEUKOCYTE ESTERASE UR QL STRIP: NEGATIVE
LYMPHOCYTES # BLD AUTO: 1.94 THOUSANDS/ΜL (ref 0.6–4.47)
LYMPHOCYTES NFR BLD AUTO: 11 % (ref 14–44)
MAGNESIUM SERPL-MCNC: 1.7 MG/DL (ref 1.6–2.6)
MCH RBC QN AUTO: 30.3 PG (ref 26.8–34.3)
MCHC RBC AUTO-ENTMCNC: 34.2 G/DL (ref 31.4–37.4)
MCV RBC AUTO: 89 FL (ref 82–98)
MONOCYTES # BLD AUTO: 1.49 THOUSAND/ΜL (ref 0.17–1.22)
MONOCYTES NFR BLD AUTO: 8 % (ref 4–12)
NEUTROPHILS # BLD AUTO: 14.68 THOUSANDS/ΜL (ref 1.85–7.62)
NEUTS SEG NFR BLD AUTO: 80 % (ref 43–75)
NITRITE UR QL STRIP: NEGATIVE
NRBC BLD AUTO-RTO: 0 /100 WBCS
PH UR STRIP.AUTO: 8.5 [PH] (ref 4.5–8)
PLATELET # BLD AUTO: 210 THOUSANDS/UL (ref 149–390)
PMV BLD AUTO: 10.8 FL (ref 8.9–12.7)
POTASSIUM SERPL-SCNC: 4.1 MMOL/L (ref 3.5–5.3)
PROT SERPL-MCNC: 7.8 G/DL (ref 6.4–8.2)
PROT UR STRIP-MCNC: NEGATIVE MG/DL
PROTHROMBIN TIME: 12.9 SECONDS (ref 11.8–14.2)
RBC # BLD AUTO: 4.88 MILLION/UL (ref 3.88–5.62)
S PYO AG THROAT QL: NEGATIVE
SODIUM SERPL-SCNC: 136 MMOL/L (ref 136–145)
SP GR UR STRIP.AUTO: 1.02 (ref 1–1.03)
TROPONIN I SERPL-MCNC: <0.02 NG/ML
TSH SERPL DL<=0.05 MIU/L-ACNC: 0.81 UIU/ML (ref 0.36–3.74)
UROBILINOGEN UR QL STRIP.AUTO: 0.2 E.U./DL
WBC # BLD AUTO: 18.31 THOUSAND/UL (ref 4.31–10.16)

## 2018-09-04 PROCEDURE — 84145 PROCALCITONIN (PCT): CPT | Performed by: PHYSICIAN ASSISTANT

## 2018-09-04 PROCEDURE — 96368 THER/DIAG CONCURRENT INF: CPT

## 2018-09-04 PROCEDURE — 99218 PR INITIAL OBSERVATION CARE/DAY 30 MINUTES: CPT | Performed by: PHYSICIAN ASSISTANT

## 2018-09-04 PROCEDURE — 87430 STREP A AG IA: CPT | Performed by: PHYSICIAN ASSISTANT

## 2018-09-04 PROCEDURE — 83605 ASSAY OF LACTIC ACID: CPT | Performed by: EMERGENCY MEDICINE

## 2018-09-04 PROCEDURE — 93005 ELECTROCARDIOGRAM TRACING: CPT

## 2018-09-04 PROCEDURE — 81003 URINALYSIS AUTO W/O SCOPE: CPT | Performed by: EMERGENCY MEDICINE

## 2018-09-04 PROCEDURE — 96366 THER/PROPH/DIAG IV INF ADDON: CPT

## 2018-09-04 PROCEDURE — 80053 COMPREHEN METABOLIC PANEL: CPT | Performed by: EMERGENCY MEDICINE

## 2018-09-04 PROCEDURE — 83735 ASSAY OF MAGNESIUM: CPT

## 2018-09-04 PROCEDURE — 85730 THROMBOPLASTIN TIME PARTIAL: CPT | Performed by: EMERGENCY MEDICINE

## 2018-09-04 PROCEDURE — 87040 BLOOD CULTURE FOR BACTERIA: CPT | Performed by: PHYSICIAN ASSISTANT

## 2018-09-04 PROCEDURE — 85610 PROTHROMBIN TIME: CPT | Performed by: EMERGENCY MEDICINE

## 2018-09-04 PROCEDURE — 36415 COLL VENOUS BLD VENIPUNCTURE: CPT

## 2018-09-04 PROCEDURE — 84443 ASSAY THYROID STIM HORMONE: CPT | Performed by: EMERGENCY MEDICINE

## 2018-09-04 PROCEDURE — 70491 CT SOFT TISSUE NECK W/DYE: CPT

## 2018-09-04 PROCEDURE — 85025 COMPLETE CBC W/AUTO DIFF WBC: CPT | Performed by: EMERGENCY MEDICINE

## 2018-09-04 PROCEDURE — 71275 CT ANGIOGRAPHY CHEST: CPT

## 2018-09-04 PROCEDURE — 87040 BLOOD CULTURE FOR BACTERIA: CPT | Performed by: EMERGENCY MEDICINE

## 2018-09-04 PROCEDURE — 99285 EMERGENCY DEPT VISIT HI MDM: CPT

## 2018-09-04 PROCEDURE — 84484 ASSAY OF TROPONIN QUANT: CPT | Performed by: EMERGENCY MEDICINE

## 2018-09-04 PROCEDURE — 96365 THER/PROPH/DIAG IV INF INIT: CPT

## 2018-09-04 RX ORDER — ACETAMINOPHEN 325 MG/1
650 TABLET ORAL ONCE
Status: COMPLETED | OUTPATIENT
Start: 2018-09-04 | End: 2018-09-04

## 2018-09-04 RX ORDER — ACETAMINOPHEN 325 MG/1
325 TABLET ORAL ONCE
Status: COMPLETED | OUTPATIENT
Start: 2018-09-04 | End: 2018-09-04

## 2018-09-04 RX ORDER — NICOTINE 21 MG/24HR
1 PATCH, TRANSDERMAL 24 HOURS TRANSDERMAL DAILY
Status: DISCONTINUED | OUTPATIENT
Start: 2018-09-05 | End: 2018-09-05 | Stop reason: HOSPADM

## 2018-09-04 RX ORDER — SODIUM CHLORIDE 9 MG/ML
100 INJECTION, SOLUTION INTRAVENOUS CONTINUOUS
Status: DISCONTINUED | OUTPATIENT
Start: 2018-09-04 | End: 2018-09-05 | Stop reason: HOSPADM

## 2018-09-04 RX ORDER — ACETAMINOPHEN 160 MG/5ML
650 SUSPENSION, ORAL (FINAL DOSE FORM) ORAL EVERY 4 HOURS PRN
Status: DISCONTINUED | OUTPATIENT
Start: 2018-09-04 | End: 2018-09-05 | Stop reason: HOSPADM

## 2018-09-04 RX ADMIN — SODIUM CHLORIDE 1000 ML: 0.9 INJECTION, SOLUTION INTRAVENOUS at 18:56

## 2018-09-04 RX ADMIN — SODIUM CHLORIDE 1000 ML: 0.9 INJECTION, SOLUTION INTRAVENOUS at 19:49

## 2018-09-04 RX ADMIN — SODIUM CHLORIDE 100 ML/HR: 0.9 INJECTION, SOLUTION INTRAVENOUS at 23:18

## 2018-09-04 RX ADMIN — IBUPROFEN 600 MG: 100 SUSPENSION ORAL at 23:12

## 2018-09-04 RX ADMIN — CEFEPIME HYDROCHLORIDE 2000 MG: 2 INJECTION, SOLUTION INTRAVENOUS at 20:23

## 2018-09-04 RX ADMIN — SODIUM CHLORIDE 500 ML: 0.9 INJECTION, SOLUTION INTRAVENOUS at 19:49

## 2018-09-04 RX ADMIN — VANCOMYCIN HYDROCHLORIDE 1250 MG: 1 INJECTION, POWDER, LYOPHILIZED, FOR SOLUTION INTRAVENOUS at 19:15

## 2018-09-04 RX ADMIN — IOHEXOL 100 ML: 350 INJECTION, SOLUTION INTRAVENOUS at 20:10

## 2018-09-04 RX ADMIN — SODIUM CHLORIDE 1000 ML: 0.9 INJECTION, SOLUTION INTRAVENOUS at 21:01

## 2018-09-04 RX ADMIN — ACETAMINOPHEN 325 MG: 325 TABLET, FILM COATED ORAL at 19:48

## 2018-09-04 RX ADMIN — ACETAMINOPHEN 650 MG: 325 TABLET, FILM COATED ORAL at 18:57

## 2018-09-04 NOTE — ED PROCEDURE NOTE
PROCEDURE  ECG 12 Lead Documentation  Date/Time: 9/4/2018 7:17 PM  Performed by: Geofm Prader  Authorized by: Geofm Prader     Indications / Diagnosis:  Fever  ECG reviewed by me, the ED Provider: yes    Patient location:  ED  Previous ECG:     Previous ECG:  Unavailable  Interpretation:     Interpretation: non-specific    Rate:     ECG rate:  99    ECG rate assessment: normal    Rhythm:     Rhythm: sinus rhythm    ST segments:     ST segments:  Non-specific         Adam Desai DO  09/04/18 1914

## 2018-09-04 NOTE — ED PROVIDER NOTES
History  Chief Complaint   Patient presents with    Shortness of Breath     woke up this AM with a sore throat, diaphoretic, went to work, then around 1100 started with right sided pain sharp shooting pain in shoulder blade and radiates down fingers and into foot with sudden onset of SOB     31-year-old male presents complaining of feeling generally unwell after he woke up this morning with a sore throat  He states that he was able to go to work today but around 1100 hours he started with pain in the right side of his chest and felt short of breath  He states that he felt very weak we took his daughter to [employer] practice  Patient has had no focal neurological deficit  Patient has no headache and no nuchal rigidity  Patient has a negative Kernig and Brudzinski sign        History provided by:  Patient  Fever - 9 weeks to 74 years   Max temp prior to arrival:  104  Temp source:  Temporal  Severity:  Severe  Onset quality:  Gradual  Duration:  1 day  Timing:  Intermittent  Progression:  Waxing and waning  Chronicity:  New  Worsened by:  Exertion  Associated symptoms: chest pain, chills, cough and sore throat    Associated symptoms: no confusion, no dysuria, no headaches, no rash, no rhinorrhea and no somnolence    Chest pain:     Quality: aching      Severity:  Mild    Onset quality:  Gradual    Timing:  Intermittent    Progression:  Waxing and waning    Chronicity:  New (Right side of the chest)  Risk factors: no recent travel        None       History reviewed  No pertinent past medical history  History reviewed  No pertinent surgical history  History reviewed  No pertinent family history  I have reviewed and agree with the history as documented  Social History   Substance Use Topics    Smoking status: Current Every Day Smoker     Packs/day: 1 00    Smokeless tobacco: Never Used    Alcohol use Yes      Comment: socially        Review of Systems   Constitutional: Positive for chills and fever  HENT: Positive for sore throat  Negative for rhinorrhea  Eyes: Negative for pain, discharge and itching  Respiratory: Positive for cough  Cardiovascular: Positive for chest pain  Gastrointestinal: Negative for abdominal distention, abdominal pain and anal bleeding  Endocrine: Negative for cold intolerance, heat intolerance and polydipsia  Genitourinary: Negative for difficulty urinating, dysuria and enuresis  Musculoskeletal: Positive for arthralgias  Negative for neck pain and neck stiffness  Skin: Negative for color change, pallor and rash  Allergic/Immunologic: Negative for environmental allergies, food allergies and immunocompromised state  Neurological: Negative for dizziness, facial asymmetry, light-headedness and headaches  Hematological: Negative for adenopathy  Does not bruise/bleed easily  Psychiatric/Behavioral: Negative for confusion  Physical Exam  Physical Exam   Constitutional: He is active  No distress  HENT:   Head: Normocephalic  Right Ear: External ear normal  No middle ear effusion  Left Ear: External ear normal   No middle ear effusion  Nose: No mucosal edema  Right sinus exhibits no frontal sinus tenderness  Left sinus exhibits no frontal sinus tenderness  Mouth/Throat: Mucous membranes are not pale  Normal dentition  No oropharyngeal exudate or posterior oropharyngeal edema  Tonsils are 0 on the right  Tonsils are 0 on the left  Eyes: Right eye exhibits no discharge  Left eye exhibits no discharge  Neck: Normal range of motion  No JVD present  No spinous process tenderness and no muscular tenderness present  No neck rigidity  No tracheal deviation and normal range of motion present  No Brudzinski's sign and no Kernig's sign noted  No thyromegaly present  Cardiovascular: Normal rate, regular rhythm and normal heart sounds  Pulmonary/Chest: Effort normal  No stridor  No respiratory distress  He has no wheezes  He has no rales     Abdominal: Soft  He exhibits no distension  There is no tenderness  There is no guarding  Musculoskeletal: Normal range of motion  He exhibits no edema or deformity  Neurological: He is alert  He displays normal reflexes  No cranial nerve deficit  Coordination normal    Skin: Skin is warm  Capillary refill takes less than 2 seconds  He is not diaphoretic  No erythema  Psychiatric: He has a normal mood and affect  His behavior is normal  Thought content normal    Vitals reviewed        Vital Signs  ED Triage Vitals [09/04/18 1833]   Temperature Pulse Respirations Blood Pressure SpO2   (!) 104 2 °F (40 1 °C) 101 18 121/63 99 %      Temp Source Heart Rate Source Patient Position - Orthostatic VS BP Location FiO2 (%)   Temporal Monitor Lying Left arm --      Pain Score       Worst Possible Pain           Vitals:    09/04/18 1833 09/04/18 2030   BP: 121/63 110/69   Pulse: 101 90   Patient Position - Orthostatic VS: Lying Lying       Visual Acuity      ED Medications  Medications   sodium chloride 0 9 % bolus 1,000 mL (1,000 mL Intravenous New Bag 9/4/18 2101)   sodium chloride 0 9 % bolus 1,000 mL (0 mL Intravenous Stopped 9/4/18 1948)   acetaminophen (TYLENOL) tablet 650 mg (650 mg Oral Given 9/4/18 1857)   sodium chloride 0 9 % bolus 1,000 mL (0 mL Intravenous Stopped 9/4/18 2102)   vancomycin (VANCOCIN) 1,250 mg in sodium chloride 0 9 % 250 mL IVPB (1,250 mg Intravenous New Bag 9/4/18 1915)   acetaminophen (TYLENOL) tablet 325 mg (325 mg Oral Given 9/4/18 1948)   sodium chloride 0 9 % bolus 500 mL (0 mL Intravenous Stopped 9/4/18 2058)   cefepime (MAXIPIME) IVPB (premix) 2,000 mg (0 mg Intravenous Stopped 9/4/18 2054)   iohexol (OMNIPAQUE) 350 MG/ML injection (MULTI-DOSE) 100 mL (100 mL Intravenous Given 9/4/18 2010)       Diagnostic Studies  Results Reviewed     Procedure Component Value Units Date/Time    TSH [[id number]]  (Normal) Collected:  09/04/18 2007    Lab Status:  Final result Specimen:  Blood Updated:  09/04/18 2028     TSH 3RD GENERATON 0 810 uIU/mL     Narrative:         Patients undergoing fluorescein dye angiography may retain small amounts of fluorescein in the body for 48-72 hours post procedure  Samples containing fluorescein can produce falsely depressed TSH values  If the patient had this procedure,a specimen should be resubmitted post fluorescein clearance  UA w Reflex to Microscopic w Reflex to Culture [[id number]]  (Abnormal) Collected:  09/04/18 1952    Lab Status:  Final result Specimen:  Urine from Urine, Clean Catch Updated:  09/04/18 1959     Color, UA Yellow     Clarity, UA Slightly Cloudy     Specific [place], UA 1 020     pH, UA 8 5 (H)     Leukocytes, UA Negative     Nitrite, UA Negative     Protein, UA Negative mg/dl      Glucose, UA Negative mg/dl      Ketones, UA Negative mg/dl      Urobilinogen, UA 0 2 E U /dl      Bilirubin, UA Negative     Blood, UA Negative    Magnesium [[id number]]  (Normal) Collected:  09/04/18 1851    Lab Status:  Final result Specimen:  Blood from Arm, Right Updated:  09/04/18 1954     Magnesium 1 7 mg/dL     Troponin I [[id number]]  (Normal) Collected:  09/04/18 1851    Lab Status:  Final result Specimen:  Blood from Arm, Right Updated:  09/04/18 1935     Troponin I <0 02 ng/mL     Lactic Acid x2 [[id number]]  (Normal) Collected:  09/04/18 1851    Lab Status:  Final result Specimen:  Blood from Arm, Right Updated:  09/04/18 1932     LACTIC ACID 1 7 mmol/L     Narrative:         Result may be elevated if tourniquet was used during collection      Comprehensive metabolic panel [[id number]] Collected:  09/04/18 1851    Lab Status:  Final result Specimen:  Blood from Arm, Right Updated:  09/04/18 1927     Sodium 136 mmol/L      Potassium 4 1 mmol/L      Chloride 100 mmol/L      CO2 28 mmol/L      ANION GAP 8 mmol/L      BUN 13 mg/dL      Creatinine 1 07 mg/dL      Glucose 101 mg/dL      Calcium 9 3 mg/dL      AST 14 U/L      ALT 21 U/L      Alkaline Phosphatase 88 U/L      Total Protein 7 8 g/dL      Albumin 4 0 g/dL      Total Bilirubin 0 30 mg/dL      eGFR 89 ml/min/1 73sq m     Narrative:         National Kidney Disease Education Program recommendations are as follows:  GFR calculation is accurate only with a steady state creatinine  Chronic Kidney disease less than 60 ml/min/1 73 sq  meters  Kidney failure less than 15 ml/min/1 73 sq  meters  APTT [[id number]]  (Normal) Collected:  09/04/18 1851    Lab Status:  Final result Specimen:  Blood from Arm, Right Updated:  09/04/18 1915     PTT 35 seconds     Protime-INR [[id number]]  (Normal) Collected:  09/04/18 1851    Lab Status:  Final result Specimen:  Blood from Arm, Right Updated:  09/04/18 1915     Protime 12 9 seconds      INR 1 02    CBC and differential [[id number]]  (Abnormal) Collected:  09/04/18 1851    Lab Status:  Final result Specimen:  Blood from Arm, Right Updated:  09/04/18 1913     WBC 18 31 (H) Thousand/uL      RBC 4 88 Million/uL      Hemoglobin 14 8 g/dL      Hematocrit 43 3 %      MCV 89 fL      MCH 30 3 pg      MCHC 34 2 g/dL      RDW 12 2 %      MPV 10 8 fL      Platelets 127 Thousands/uL      nRBC 0 /100 WBCs      Neutrophils Relative 80 (H) %      Immat GRANS % 1 %      Lymphocytes Relative 11 (L) %      Monocytes Relative 8 %      Eosinophils Relative 0 %      Basophils Relative 0 %      Neutrophils Absolute 14 68 (H) Thousands/µL      Immature Grans Absolute 0 10 Thousand/uL      Lymphocytes Absolute 1 94 Thousands/µL      Monocytes Absolute 1 49 (H) Thousand/µL      Eosinophils Absolute 0 05 Thousand/µL      Basophils Absolute 0 05 Thousands/µL     Blood culture #2 [[id number]] Collected:  09/04/18 1851    Lab Status: In process Specimen:  Blood from Arm, Right Updated:  09/04/18 1857    Blood culture #1 [[id number]] Collected:  09/04/18 1852    Lab Status:   In process Specimen:  Blood from Arm, Left Updated:  09/04/18 1857                 CT soft tissue neck with contrast   Final Result by Gene Negron DO (09/04 2050)      Acute bilateral tonsillopharyngitis with reactive adenopathy  No tonsillar or peritonsillar abscess  Please note the lung apices are not included on this examination  See separate CT chest             Workstation performed: [id number]         CTA ED chest PE study   Final Result by Colonel Charli DO (09/04 2043)      No evidence of pulmonary embolism  Clear lung fields  Workstation performed: [id number]                    Procedures  Procedures       Phone Contacts  ED Phone Contact    ED Course  ED Course as of Sep 04 2137   Tue Sep 04, 2018   2134 CT soft tissue neck with contrast         HEART Risk Score      Most Recent Value   History  1 Filed at: 09/04/2018 1917   ECG  0 Filed at: 09/04/2018 1917   Age  0 Filed at: 09/04/2018 1917   Risk Factors  0 Filed at: 09/04/2018 1917   Troponin  0 Filed at: 09/04/2018 1917   Heart Score Risk Calculator   History  1 Filed at: 09/04/2018 1917   ECG  0 Filed at: 09/04/2018 1917   Age  0 Filed at: 09/04/2018 1917   Risk Factors  0 Filed at: 09/04/2018 1917   Troponin  0 Filed at: 09/04/2018 1917   HEART Score  1 Filed at: 09/04/2018 1917   HEART Score  1 Filed at: 09/04/2018 1917                            MDM  Number of Diagnoses or Management Options  Diagnosis management comments: Differential diagnosis 1  Viral illness 2  Peritonsillar abscess 3  Pneumonia 4  Pulmonary embolism  I will make sure the patient gets a g Tylenol treat with sepsis criteria 30 cc/kilos fluid bolus along with broad-spectrum antibiotics  We will perform CTA of chest     21;00  Patient with acute b/l tonsilipharyngitis  Temp is down, based on patient's abnormal lab values I did offer him admission  He wishes to speak to his wife to discuss with her whether he should stay in the hospital or go home          Amount and/or Complexity of Data Reviewed  Clinical lab tests: reviewed  Tests in the radiology section of CPT®: reviewed  Tests in the medicine section of CPT®: reviewed    Risk of Complications, Morbidity, and/or Mortality  Presenting problems: high  Diagnostic procedures: high  Management options: high      Total time providing critical care: 45 minutes for broad-spectrum antibiotics and fluid bolus  Disposition  Final diagnoses:   Sepsis ([facility] )   Leukocytosis   Weakness   Pharyngitis   Fever and chills     Time reflects when diagnosis was documented in both MDM as applicable and the Disposition within this note     Time User Action Codes Description Comment    9/4/2018  9:35 PM Wisdom Fitch Add [A41 9] Sepsis ([facility] )     9/4/2018  9:35 PM Wisdom Fitch Add [O14 456] Leukocytosis     9/4/2018  9:35 PM Wisdom Fitch Add [R53 1] Weakness     9/4/2018  9:36 PM Wisdom Fitch Add [J02 9] Pharyngitis     9/4/2018  9:36 PM Ynes Mariee Add [R50 9] Fever and chills       ED Disposition     ED Disposition Condition Comment    Admit  Medicine PAESTEFANY       Follow-up Information    None         Patient's Medications   Discharge Prescriptions    No medications on file     No discharge procedures on file      ED Provider  Electronically Signed by           Bonnie Cordero DO  09/04/18 5955

## 2018-09-05 VITALS
WEIGHT: 195.77 LBS | HEART RATE: 88 BPM | SYSTOLIC BLOOD PRESSURE: 119 MMHG | BODY MASS INDEX: 25.12 KG/M2 | RESPIRATION RATE: 18 BRPM | OXYGEN SATURATION: 95 % | TEMPERATURE: 99.1 F | DIASTOLIC BLOOD PRESSURE: 68 MMHG | HEIGHT: 74 IN

## 2018-09-05 LAB
ALBUMIN SERPL BCP-MCNC: 2.9 G/DL (ref 3.5–5)
ALP SERPL-CCNC: 69 U/L (ref 46–116)
ALT SERPL W P-5'-P-CCNC: 13 U/L (ref 12–78)
ANION GAP SERPL CALCULATED.3IONS-SCNC: 7 MMOL/L (ref 4–13)
AST SERPL W P-5'-P-CCNC: 13 U/L (ref 5–45)
ATRIAL RATE: 99 BPM
BASOPHILS # BLD MANUAL: 0 THOUSAND/UL (ref 0–0.1)
BASOPHILS NFR MAR MANUAL: 0 % (ref 0–1)
BILIRUB SERPL-MCNC: 0.3 MG/DL (ref 0.2–1)
BUN SERPL-MCNC: 12 MG/DL (ref 5–25)
CALCIUM SERPL-MCNC: 8 MG/DL (ref 8.3–10.1)
CHLORIDE SERPL-SCNC: 106 MMOL/L (ref 100–108)
CO2 SERPL-SCNC: 27 MMOL/L (ref 21–32)
CREAT SERPL-MCNC: 0.94 MG/DL (ref 0.6–1.3)
EOSINOPHIL # BLD MANUAL: 0 THOUSAND/UL (ref 0–0.4)
EOSINOPHIL NFR BLD MANUAL: 0 % (ref 0–6)
ERYTHROCYTE [DISTWIDTH] IN BLOOD BY AUTOMATED COUNT: 12.5 % (ref 11.6–15.1)
GFR SERPL CREATININE-BSD FRML MDRD: 104 ML/MIN/1.73SQ M
GLUCOSE SERPL-MCNC: 125 MG/DL (ref 65–140)
HCT VFR BLD AUTO: 38.4 % (ref 36.5–49.3)
HGB BLD-MCNC: 12.7 G/DL (ref 12–17)
LYMPHOCYTES # BLD AUTO: 1.56 THOUSAND/UL (ref 0.6–4.47)
LYMPHOCYTES # BLD AUTO: 10 % (ref 14–44)
MAGNESIUM SERPL-MCNC: 1.8 MG/DL (ref 1.6–2.6)
MCH RBC QN AUTO: 30.3 PG (ref 26.8–34.3)
MCHC RBC AUTO-ENTMCNC: 33.1 G/DL (ref 31.4–37.4)
MCV RBC AUTO: 92 FL (ref 82–98)
MONOCYTES # BLD AUTO: 0.62 THOUSAND/UL (ref 0–1.22)
MONOCYTES NFR BLD: 4 % (ref 4–12)
NEUTROPHILS # BLD MANUAL: 13.42 THOUSAND/UL (ref 1.85–7.62)
NEUTS BAND NFR BLD MANUAL: 6 % (ref 0–8)
NEUTS SEG NFR BLD AUTO: 80 % (ref 43–75)
NRBC BLD AUTO-RTO: 0 /100 WBCS
P AXIS: 10 DEGREES
PHOSPHATE SERPL-MCNC: 2.9 MG/DL (ref 2.7–4.5)
PLATELET # BLD AUTO: 161 THOUSANDS/UL (ref 149–390)
PLATELET BLD QL SMEAR: ADEQUATE
PMV BLD AUTO: 11.1 FL (ref 8.9–12.7)
POTASSIUM SERPL-SCNC: 3.9 MMOL/L (ref 3.5–5.3)
PR INTERVAL: 134 MS
PROCALCITONIN SERPL-MCNC: 0.11 NG/ML
PROT SERPL-MCNC: 6.3 G/DL (ref 6.4–8.2)
QRS AXIS: 13 DEGREES
QRSD INTERVAL: 84 MS
QT INTERVAL: 310 MS
QTC INTERVAL: 397 MS
RBC # BLD AUTO: 4.19 MILLION/UL (ref 3.88–5.62)
SODIUM SERPL-SCNC: 140 MMOL/L (ref 136–145)
T WAVE AXIS: 51 DEGREES
TOTAL CELLS COUNTED SPEC: 100
VENTRICULAR RATE: 99 BPM
WBC # BLD AUTO: 15.6 THOUSAND/UL (ref 4.31–10.16)

## 2018-09-05 PROCEDURE — 93010 ELECTROCARDIOGRAM REPORT: CPT | Performed by: INTERNAL MEDICINE

## 2018-09-05 PROCEDURE — 85027 COMPLETE CBC AUTOMATED: CPT | Performed by: PHYSICIAN ASSISTANT

## 2018-09-05 PROCEDURE — 83735 ASSAY OF MAGNESIUM: CPT | Performed by: PHYSICIAN ASSISTANT

## 2018-09-05 PROCEDURE — 80053 COMPREHEN METABOLIC PANEL: CPT | Performed by: PHYSICIAN ASSISTANT

## 2018-09-05 PROCEDURE — 85007 BL SMEAR W/DIFF WBC COUNT: CPT | Performed by: PHYSICIAN ASSISTANT

## 2018-09-05 PROCEDURE — 84100 ASSAY OF PHOSPHORUS: CPT | Performed by: PHYSICIAN ASSISTANT

## 2018-09-05 PROCEDURE — 99217 PR OBSERVATION CARE DISCHARGE MANAGEMENT: CPT | Performed by: PHYSICIAN ASSISTANT

## 2018-09-05 RX ORDER — AMOXICILLIN 500 MG/1
1000 CAPSULE ORAL EVERY 24 HOURS
Qty: 20 CAPSULE | Refills: 0 | Status: SHIPPED | OUTPATIENT
Start: 2018-09-05 | End: 2018-09-15

## 2018-09-05 RX ORDER — DEXAMETHASONE SODIUM PHOSPHATE 4 MG/ML
4 INJECTION, SOLUTION INTRA-ARTICULAR; INTRALESIONAL; INTRAMUSCULAR; INTRAVENOUS; SOFT TISSUE ONCE
Status: COMPLETED | OUTPATIENT
Start: 2018-09-05 | End: 2018-09-05

## 2018-09-05 RX ADMIN — ACETAMINOPHEN 650 MG: 160 SUSPENSION ORAL at 04:54

## 2018-09-05 RX ADMIN — DEXAMETHASONE SODIUM PHOSPHATE 4 MG: 4 INJECTION, SOLUTION INTRAMUSCULAR; INTRAVENOUS at 10:48

## 2018-09-05 RX ADMIN — PHENOL 1 SPRAY: 1.5 LIQUID ORAL at 10:48

## 2018-09-05 RX ADMIN — IBUPROFEN 600 MG: 100 SUSPENSION ORAL at 08:59

## 2018-09-05 RX ADMIN — Medication 2000 MG: at 06:26

## 2018-09-05 NOTE — ASSESSMENT & PLAN NOTE
- fever reported at 104° F upon entry into the emergency department  - temperature upon admission floor 100 4 F  - please see above plan for pharyngitis

## 2018-09-05 NOTE — DISCHARGE INSTR - APPOINTMENTS
Pt has a follow up appt at [facility]  On Thursday September 13, 2018 at 1:30 pm with Dr Leana Jean Baptiste  The address is [address] [place] [address]   Phone number is  [phone]  Please call if need to cancel or change appt

## 2018-09-05 NOTE — PLAN OF CARE
DISCHARGE PLANNING     Discharge to home or other facility with appropriate resources Completed        INFECTION - ADULT     Absence or prevention of progression during hospitalization Completed        Knowledge Deficit     Patient/family/caregiver demonstrates understanding of disease process, treatment plan, medications, and discharge instructions Completed        PAIN - ADULT     Verbalizes/displays adequate comfort level or baseline comfort level Completed        SAFETY ADULT     Patient will remain free of falls Completed     Maintain or return to baseline ADL function Completed     Maintain or return mobility status to optimal level Completed

## 2018-09-05 NOTE — H&P
H&P- Ivelisse Cervantes 1980, 41 y o  male MRN: [id number]    Unit/Bed#: 421-01 Encounter: [id number]    Primary Care Provider: No primary care provider on file  Date and time admitted to hospital: 9/4/2018  6:38 PM        * Pharyngitis   Assessment & Plan    - admitted for observation by emergency department attending  - received 1 dose cefepime 2 g in the emergency department, will continue cefepime while here  - white pustules noted posterior pharynx, how will obtain rapid strep for verification          Fever   Assessment & Plan    - fever reported at 104° F upon entry into the emergency department  - temperature upon admission floor 100 4 F  - alternate between on her profound and Tylenol for fever control          History and Physical - [facility]    Patient Information: Ivelisse Cervantes 41 y o  male MRN: [id number]  Unit/Bed#: 024-50 Encounter: [id number]  Admitting Physician: Lucita Murry PA-C  PCP: No primary care provider on file  Date of Admission:  09/04/18    Assessment/Plan:    Hospital Problem List:     Principal Problem:    Pharyngitis  Active Problems:    Fever          VTE Prophylaxis: Pharmacologic VTE Prophylaxis contraindicated due to Low risk for DVT  / sequential compression device   Code Status:  Full  POLST: There is no POLST form on file for this patient (pre-hospital)    Anticipated Length of Stay:  Patient will be admitted on an Observation basis with an anticipated length of stay of  < 2 midnights  Justification for Hospital Stay:  Pharyngitis / fever, admitted per emergency department attending    Total Time for Visit, including Counseling / Coordination of Care: 30 minutes  Greater than 50% of this total time spent on direct patient counseling and coordination of care      Chief Complaint:   Pharyngitis / fever, admitted per emergency department attending    History of Present Illness:    Ivelisse Cervantes is a 41 y o  male with no significant past medical history who presented to the emergency department for evaluation of sore throat and fever  Patient states he woke this morning complaining of sore throat , diaphoresis, and generalized malaise afterwards he did go to work, at approximately 1100 hr was complaining of pain right-sided chest, feeling weak  Upon entry into the emergency department he was febrile with temp of 104° F, received cefepime  Labs and radiologic studies were obtained WBC count 18 31 with absolute neutrophils at 14 68  Patient admitted to medical-surgical floor by ED attending  Review of Systems:    Review of Systems   Constitutional: Positive for chills, fatigue and fever  HENT: Positive for sore throat and trouble swallowing  Eyes: Negative  Respiratory: Negative  Cardiovascular: Negative  Gastrointestinal: Negative  Endocrine: Negative  Genitourinary: Negative  Musculoskeletal: Positive for myalgias  Skin: Negative  Allergic/Immunologic: Negative  Neurological: Negative  Hematological: Negative  Psychiatric/Behavioral: Negative  Past Medical and Surgical History:     History reviewed  No pertinent past medical history  History reviewed  No pertinent surgical history  Meds/Allergies:    Prior to Admission medications    Medication Sig Start Date End Date Taking?  Authorizing Provider   carBAMazepine (TEGretol) 200 mg tablet Take 200 mg by mouth 3 (three) times a day  9/4/18  Historical Provider, MD   diclofenac sodium (VOLTAREN) 50 mg EC tablet Take 1 tablet (50 mg total) by mouth 2 (two) times a day as needed (pain) 4/9/18 9/4/18  Reece Farah, DO   famotidine (PEPCID) 20 mg tablet Take 1 tablet (20 mg total) by mouth 2 (two) times a day 4/9/18 9/4/18  Reece Farah, DO   FLUoxetine (PROzac) 10 mg capsule Take 10 mg by mouth daily  9/4/18  Historical Provider, MD   methylphenidate (RITALIN) 10 mg tablet Take 5 mg by mouth 2 (two) times a day before breakfast and lunch  9/4/18  Historical Provider, MD   ondansetron (ZOFRAN-ODT) 8 mg disintegrating tablet Take 1 tablet (8 mg total) by mouth every 8 (eight) hours as needed for nausea or vomiting 4/9/18 9/4/18  Shree Ferguson DO     I have reviewed home medications with patient personally  Allergies: Allergies   Allergen Reactions    Bee Venom        Social History:     Marital Status: Single   Occupation:    Patient Pre-hospital Living Situation:  Home  Patient Pre-hospital Level of Mobility:  Full  Patient Pre-hospital Diet Restrictions:  Full diet as tolerated  Substance Use History:   History   Alcohol Use    Yes     Comment: socially     History   Smoking Status    Current Every Day Smoker    Packs/day: 1 00    Types: Cigarettes   Smokeless Tobacco    Never Used     History   Drug Use No       Family History:    non-contributory    Physical Exam:     Vitals:   Blood Pressure: 129/70 (09/04/18 2311)  Pulse: 94 (09/04/18 2311)  Temperature: (!) 103 9 °F (39 9 °C) (09/04/18 2311)  Temp Source: Temporal (09/04/18 2311)  Respirations: 20 (09/04/18 2311)  Height: 6' 2" (188 cm) (09/04/18 2220)  Weight - Scale: 88 8 kg (195 lb 12 3 oz) (09/04/18 2220)  SpO2: 98 % (09/04/18 2311)    Physical Exam        Additional Data:     Lab Results: I have personally reviewed pertinent reports  Results from last 7 days  Lab Units 09/04/18  1851   WBC Thousand/uL 18 31*   HEMOGLOBIN g/dL 14 8   HEMATOCRIT % 43 3   PLATELETS Thousands/uL 210   NEUTROS PCT % 80*   LYMPHS PCT % 11*   MONOS PCT % 8   EOS PCT % 0       Results from last 7 days  Lab Units 09/04/18  1851   SODIUM mmol/L 136   POTASSIUM mmol/L 4 1   CHLORIDE mmol/L 100   CO2 mmol/L 28   BUN mg/dL 13   CREATININE mg/dL 1 07   CALCIUM mg/dL 9 3   ALK PHOS U/L 88   ALT U/L 21   AST U/L 14       Results from last 7 days  Lab Units 09/04/18  1851   INR  1 02       Imaging: I have personally reviewed pertinent reports        Ct Soft Tissue Neck With Contrast    Result Date: 9/4/2018  Narrative: CT NECK WITH CONTRAST INDICATION:   fever, neck pain and sore throat  COMPARISON:  None  TECHNIQUE:  Contiguous 2 5 mm images were obtained through the neck after administration of intravenous contrast  Radiation dose length product (DLP) for this visit:  438 49 mGy-cm   This examination, like all CT scans performed in the [address] [place] [address], was performed utilizing techniques to minimize radiation dose exposure, including the use of iterative  reconstruction and automated exposure control  IV Contrast:  100 mL of iohexol (OMNIPAQUE) IMAGE QUALITY:  Diagnostic  FINDINGS: VISUALIZED BRAIN PARENCHYMA:  No acute intracranial pathology of the visualized brain parenchyma  VISUALIZED ORBITS AND PARANASAL SINUSES:  Normal  NASAL CAVITY AND NASOPHARYNX:  Normal  SUPRAHYOID NECK:  Normal tongue musculature  Increased enhancement and edema noted within the palatine tonsils bilaterally consistent with acute tonsillopharyngitis  No discrete abscess identified  Normal soft palate  Mild prominence of the lingual tonsils, bilaterally symmetric with prominent enhancement of the mucosa  Normal epiglottis  Normal parapharyngeal fat  INFRAHYOID NECK:  Aryepiglottic folds and piriform sinuses are normal   Normal glottis and subglottic airway  THYROID GLAND:  Thyroid is only partially visualized and grossly unremarkable  PAROTID AND SUBMANDIBULAR GLANDS:  Normal  LYMPH NODES:  Reactive anterior and posterior jugular chain nodes are noted above and below the hyoid bone  VASCULAR STRUCTURES:  Normal enhancement of the cervical vasculature  THORACIC INLET:  See separate CT chest report  BONY STRUCTURES: No acute fracture or destructive osseous lesion  Impression: Acute bilateral tonsillopharyngitis with reactive adenopathy  No tonsillar or peritonsillar abscess  Please note the lung apices are not included on this examination    See separate CT chest  Workstation performed: [id number]     Cta Ed Chest Pe Study    Result Date: 9/4/2018  Narrative: CTA - CHEST WITH IV CONTRAST - PULMONARY ANGIOGRAM INDICATION:   pleuritic chest pain, throat pain  COMPARISON: None  TECHNIQUE: CTA examination of the chest was performed using angiographic technique according to a protocol specifically tailored to evaluate for pulmonary embolism  Axial, sagittal, and coronal 2D reformatted images were created from the source data and  submitted for interpretation  In addition, coronal 3D MIP postprocessing was performed on the acquisition scanner  Radiation dose length product (DLP) for this visit:  827 49 mGy-cm   This examination, like all CT scans performed in the [facility], was performed utilizing techniques to minimize radiation dose exposure, including the use of iterative  reconstruction and automated exposure control  IV Contrast:  100 mL of iohexol (OMNIPAQUE)  FINDINGS: PULMONARY ARTERIAL TREE:  No pulmonary embolus is seen  LUNGS:  Clear lung fields  There is a bulla within the posterior right upper lobe  PLEURA:  Unremarkable  HEART/AORTA:  Unremarkable for patient's age  MEDIASTINUM AND ANKIT:  Unremarkable  CHEST WALL AND LOWER NECK:   Unremarkable  VISUALIZED STRUCTURES IN THE UPPER ABDOMEN:  Unremarkable  OSSEOUS STRUCTURES:  No acute fracture or destructive osseous lesion  Impression: No evidence of pulmonary embolism  Clear lung fields  Workstation performed: [id number]       EKG, Pathology, and Other Studies Reviewed on Admission:   · EKG: NSR    Allscripts / Epic Records Reviewed: Yes     ** Please Note: This note has been constructed using a voice recognition system   **

## 2018-09-05 NOTE — ASSESSMENT & PLAN NOTE
The patient presented to the emergency department with a complaint of sore throat, fever, and generalized malaise  CT soft tissue neck showed acute bilateral tonsillopharyngitis with reactive adenopathy  No tonsillar or peritonsillar abscess  The patient was given IV Vancomycin and IV Cefepime in the ER  He was placed in 23 hour observation and continued on IV Cefepime  The patient's sore throat improved  He was discharged home with a script for Amoxicillin 1000 mg PO daily x 10 days  He was instructed to follow up with a family doctor within 1 week  The patient does NOT currently have a PCP  Case management was consulted and will be assisting the patient with this

## 2018-09-05 NOTE — DISCHARGE SUMMARY
Discharge- Luz Mcarthur 1980, 41 y o  male MRN: [id number]    Unit/Bed#: 385-47 Encounter: [id number]    Primary Care Provider: No primary care provider on file  Date and time admitted to hospital: 9/4/2018  6:38 PM        * Pharyngitis   Assessment & Plan    The patient presented to the emergency department with a complaint of sore throat, fever, and generalized malaise  CT soft tissue neck showed acute bilateral tonsillopharyngitis with reactive adenopathy  No tonsillar or peritonsillar abscess  The patient was given IV Vancomycin and IV Cefepime in the ER  He was placed in 23 hour observation and continued on IV Cefepime  The patient's sore throat improved  He was discharged home with a script for Amoxicillin 1000 mg PO daily x 10 days  He was instructed to follow up with a family doctor within 1 week  The patient does NOT currently have a PCP  Case management was consulted and will be assisting the patient with this  Fever   Assessment & Plan    - fever reported at 104° F upon entry into the emergency department  - temperature upon admission floor 100 4 F  - please see above plan for pharyngitis  Discharging Physician / Practitioner: Juan Hoang PA-C  PCP: No primary care provider on file  Admission Date:   Admission Orders     Ordered        09/04/18 2137  Place in Observation (expected length of stay for this patient is less than two midnights)  Once             Discharge Date: 09/05/18    Resolved Problems  Date Reviewed: 9/5/2018    None          Consultations During Hospital Stay:  · none    Procedures Performed:     Ct Soft Tissue Neck With Contrast    Result Date: 9/4/2018  Impression: Acute bilateral tonsillopharyngitis with reactive adenopathy  No tonsillar or peritonsillar abscess  Please note the lung apices are not included on this examination    See separate CT chest  Workstation performed: [id number]     Cta Ed Chest Pe Study    Result Date: 9/4/2018  · Impression: No evidence of pulmonary embolism  Clear lung fields  Workstation performed: [id number]     Significant Findings / Test Results:     · WBC 18 31    Incidental Findings:   · none     Test Results Pending at Discharge (will require follow up):   · none     Outpatient Tests Requested:  · none    Complications:  none    Reason for Admission: fever, sore throat    Hospital Course:     Liss Acuña is a 41 y o  male patient who originally presented to the hospital on 9/4/2018 due to  sore throat and fever  Patient states he woke this morning complaining of sore throat , diaphoresis, and generalized malaise afterwards he did go to work, at approximately 1100 hr was complaining of pain right-sided chest, feeling weak  Upon entry into the emergency department he was febrile with temp of 104° F, received cefepime  Labs and radiologic studies were obtained WBC count 18 31 with absolute neutrophils at 14 68  Patient admitted to medical-surgical floor by ED attending  Please see above list of diagnoses and related plan for additional information  Condition at Discharge: good     Discharge Day Visit / Exam:     Subjective:    Vitals: Blood Pressure: 119/68 (09/05/18 0703)  Pulse: 88 (09/05/18 0703)  Temperature: 99 1 °F (37 3 °C) (09/05/18 0703)  Temp Source: Temporal (09/05/18 0703)  Respirations: 18 (09/05/18 0703)  Height: 6' 2" (188 cm) (09/04/18 2220)  Weight - Scale: 88 8 kg (195 lb 12 3 oz) (09/04/18 2220)  SpO2: 95 % (09/05/18 0703)  Exam:   Physical Exam   Constitutional: He is oriented to person, place, and time  Vital signs are normal  He appears well-developed and well-nourished  He is active and cooperative  HENT:   Mouth/Throat: Oropharyngeal exudate, posterior oropharyngeal edema and posterior oropharyngeal erythema present  Cardiovascular: Normal rate, regular rhythm and normal heart sounds      Pulmonary/Chest: Effort normal and breath sounds normal  He has no wheezes  He has no rhonchi  He has no rales  Abdominal: Soft  Normal appearance and bowel sounds are normal  There is no tenderness  Neurological: He is alert and oriented to person, place, and time  No cranial nerve deficit  Skin: Skin is warm, dry and intact  Nursing note and vitals reviewed  Discussion with Family: n/a    Discharge instructions/Information to patient and family:   See after visit summary for information provided to patient and family  Provisions for Follow-Up Care:  See after visit summary for information related to follow-up care and any pertinent home health orders  Disposition:     Home    For Discharges to [facility]:   · Not Applicable to this Patient - Not Applicable to this Patient    Planned Readmission: no     Discharge Statement:  I spent 25 minutes discharging the patient  This time was spent on the day of discharge  I had direct contact with the patient on the day of discharge  Greater than 50% of the total time was spent examining patient, answering all patient questions, arranging and discussing plan of care with patient as well as directly providing post-discharge instructions  Additional time then spent on discharge activities  Discharge Medications:  See after visit summary for reconciled discharge medications provided to patient and family        ** Please Note: This note has been constructed using a voice recognition system **

## 2018-09-05 NOTE — CASE MANAGEMENT
Initial Clinical Review  Admission: Date/Time/Statement:   Admission Orders     Ordered        09/04/18 2137  Place in Observation (expected length of stay for this patient is less than two midnights)  Once             Orders Placed This Encounter   Procedures    Place in Observation (expected length of stay for this patient is less than two midnights)     Standing Status:   Standing     Number of Occurrences:   1     Order Specific Question:   Admitting Physician     Answer:   Guillermo Mccarthy [[id number]]     Order Specific Question:   Level of Care     Answer:   Med Surg [16]   ED: Date/Time/Mode of Arrival:   ED Arrival Information     Expected Arrival Acuity Means of Arrival Escorted By Service Admission Type    - 9/4/2018 18:23 Urgent Walk-In Family Member General Medicine Urgent    Arrival Complaint    SOB      Chief Complaint:   Chief Complaint   Patient presents with    Shortness of Breath     woke up this AM with a sore throat, diaphoretic, went to work, then around 1100 started with right sided pain sharp shooting pain in shoulder blade and radiates down fingers and into foot with sudden onset of SOB   History of Illness:   61-year-old male presents complaining of feeling generally unwell after he woke up this morning with a sore throat  He states that he was able to go to work today but around 1100 hours he started with pain in the right side of his chest and felt short of breath  ED Vital Signs:   ED Triage Vitals [09/04/18 1833]   Temperature Pulse Respirations Blood Pressure SpO2   (!) 104 2 °F (40 1 °C) 101 18 121/63 99 %      Temp Source Heart Rate Source Patient Position - Orthostatic VS BP Location FiO2 (%)   Temporal Monitor Lying Left arm --      Pain Score       Worst Possible Pain        Wt Readings from Last 1 Encounters:   09/04/18 88 8 kg (195 lb 12 3 oz)   Vital Signs (abnormal): Abnormal Labs/Diagnostic Test Results:   WBC 18 31   CTA CHEST=No evidence of pulmonary embolism    Clear lung fields  CT SOFT TISSUE NECK=Acute bilateral tonsillopharyngitis with reactive adenopathy    No tonsillar or peritonsillar abscess  EKG=Interpretation: non-specific    Rate:     ECG rate:  99    ECG rate assessment: normal    Rhythm:     Rhythm: sinus rhythm    ST segments:     ST segments:  Non-specific  ED Treatment:   Medication Administration from 09/04/2018 1823 to 09/04/2018 2207       Date/Time Order Dose Route Action Action by Comments     09/04/2018 1948 sodium chloride 0 9 % bolus 1,000 mL 0 mL Intravenous Stopped Rylee Connye Police, RN      09/04/2018 1856 sodium chloride 0 9 % bolus 1,000 mL 1,000 mL Intravenous New Bag Rylee Connye Police, RN      09/04/2018 1857 acetaminophen (TYLENOL) tablet 650 mg 650 mg Oral Given Rylee Connye Police, RN      09/04/2018 2102 sodium chloride 0 9 % bolus 1,000 mL 0 mL Intravenous Stopped Rylee Connye Police, RN      09/04/2018 1949 sodium chloride 0 9 % bolus 1,000 mL 1,000 mL Intravenous New Bag Rylee Connye Police, RN      09/04/2018 1947 cefepime (MAXIPIME) 2,000 mg in dextrose 5 % 50 mL IVPB   Intravenous Canceled Entry Rylee Connye Police, RN      09/04/2018 1915 vancomycin (VANCOCIN) 1,250 mg in sodium chloride 0 9 % 250 mL IVPB 1,250 mg Intravenous New Bag Rylee Connye Police, RN      09/04/2018 1948 acetaminophen (TYLENOL) tablet 325 mg 325 mg Oral Given Rylee Connye Police, RN      09/04/2018 2101 sodium chloride 0 9 % bolus 1,000 mL 1,000 mL Intravenous New Bag Rylee Connye Police, RN      09/04/2018 2058 sodium chloride 0 9 % bolus 500 mL 0 mL Intravenous Stopped Rylee Connye Police, RN      09/04/2018 1949 sodium chloride 0 9 % bolus 500 mL 500 mL Intravenous New Bag Rylee Connye Police, RN      09/04/2018 2054 cefepime (MAXIPIME) IVPB (premix) 2,000 mg 0 mg Intravenous Stopped Rylee Connye Police, RN      09/04/2018 2023 cefepime (MAXIPIME) IVPB (premix) 2,000 mg 2,000 mg Intravenous New Bag Rylee Connye [facility], RN      09/04/2018 2010 iohexol (OMNIPAQUE) 350 MG/ML injection (MULTI-DOSE) 100 mL 100 mL Intravenous Given Herlinda Rex       Past Medical/Surgical History:    Active Ambulatory Problems     Diagnosis Date Noted    No Active Ambulatory Problems     Resolved Ambulatory Problems     Diagnosis Date Noted    No Resolved Ambulatory Problems     No Additional Past Medical History   Admitting Diagnosis: Leukocytosis [D72 829]  Fever and chills [R50 9]  Pharyngitis [J02 9]  Weakness [R53 1]  SOB (shortness of breath) [R06 02]  Sepsis ([address] ) [A41 9]  Age/Sex: 41 y o  male  Assessment/Plan:   Pharyngitis   Assessment & Plan     - admitted for observation by emergency department attending  - received 1 dose cefepime 2 g in the emergency department, will continue cefepime while here  - white pustules noted posterior pharynx, how will obtain rapid strep for verification       Fever   Assessment & Plan     - fever reported at 104° F upon entry into the emergency department  - temperature upon admission floor 100 4 F  - alternate between on her profound and Tylenol for fever control   Admission Orders:  MED SURG  VENODYNES  Scheduled Meds:   Current Facility-Administered Medications:  acetaminophen 650 mg Oral Q4H PRN Krupa Pineda PA-C    cefepime 2,000 mg Intravenous Q12H Krupa Pineda PA-C Last Rate: 2,000 mg (09/05/18 0626)   ibuprofen 600 mg Oral Q8H PRN Krupa Pineda PA-C    nicotine 1 patch Transdermal Daily Krupa Pineda PA-C    sodium chloride 100 mL/hr Intravenous Continuous Krupa Pineda PA-C Last Rate: 100 mL/hr (09/04/18 2318)     Continuous Infusions:   sodium chloride 100 mL/hr Last Rate: 100 mL/hr (09/04/18 2318)     PRN Meds:   acetaminophen    ibuprofen

## 2018-09-05 NOTE — SOCIAL WORK
Met with pt to discuss role as  in helping pt to develop discharge plan and to help pt carry out their plan  Pt lives in a house with his SO and Kids part time  Pt has 2 EVANGELIST  Pt has 13 steps on the inside  Pt has his bedroom and bathroom on the 2nd floor  Pt has no DME at home  Pt is independent with ADL's  Pt is employed as a   Pt has no PCP but is agreeable to me setting him up with one on discharge

## 2018-09-05 NOTE — ASSESSMENT & PLAN NOTE
- admitted for observation by emergency department attending  - received 1 dose cefepime 2 g in the emergency department, will continue cefepime while here  - white pustules noted posterior pharynx, how will obtain rapid strep for verification

## 2018-09-05 NOTE — SOCIAL WORK
Pt is being discharged today  Pt's SO will give the pt a ride home  Pt is agreeable to me setting up with new PCP at [facility]   Made a follow up at [facility] and reviewed with pt  Case Management reviewed discharge planning process including the following: identifying help that is needed at home, pt's preference for discharge needs and Meds at [facility]  Reviewed with Pt that any member of the healthcare team can answer questions regarding : medications, jmportance of recognizing  Signs and symptoms of any  medical problems  Case Management also encouraged pt to follow up with all recommended appointments after discharge

## 2018-09-05 NOTE — ASSESSMENT & PLAN NOTE
- fever reported at 104° F upon entry into the emergency department  - temperature upon admission floor 100 4 F  - alternate between on her profound and Tylenol for fever control

## 2018-09-05 NOTE — PLAN OF CARE
Problem: PAIN - ADULT  Goal: Verbalizes/displays adequate comfort level or baseline comfort level  Interventions:  - Encourage patient to monitor pain and request assistance  - Assess pain using appropriate pain scale  - Administer analgesics based on type and severity of pain and evaluate response  - Implement non-pharmacological measures as appropriate and evaluate response  - Consider cultural and social influences on pain and pain management  - Notify physician/advanced practitioner if interventions unsuccessful or patient reports new pain  Outcome: Progressing      Problem: INFECTION - ADULT  Goal: Absence or prevention of progression during hospitalization  INTERVENTIONS:  - Assess and monitor for signs and symptoms of infection  - Monitor lab/diagnostic results  - Monitor all insertion sites, i e  indwelling lines, tubes, and drains  - [place] appropriate cooling/warming therapies per order  - Administer medications as ordered  - Instruct and encourage patient and family to use good hand hygiene technique  - Identify and instruct in appropriate isolation precautions for identified infection/condition  Outcome: Progressing      Problem: SAFETY ADULT  Goal: Patient will remain free of falls  INTERVENTIONS:  - Assess patient frequently for physical needs  -  Identify cognitive and physical deficits and behaviors that affect risk of falls    -  [place] fall precautions as indicated by assessment   - Educate patient/family on patient safety including physical limitations  - Instruct patient to call for assistance with activity based on assessment  - Modify environment to reduce risk of injury  - Consider OT/PT consult to assist with strengthening/mobility  Outcome: Progressing    Goal: Maintain or return to baseline ADL function  INTERVENTIONS:  -  Assess patient's ability to carry out ADLs; assess patient's baseline for ADL function and identify physical deficits which impact ability to perform ADLs (bathing, care of mouth/teeth, toileting, grooming, dressing, etc )  - Assess/evaluate cause of self-care deficits   - Assess range of motion  - Assess patient's mobility; develop plan if impaired  - Assess patient's need for assistive devices and provide as appropriate  - Encourage maximum independence but intervene and supervise when necessary  ¯ Involve family in performance of ADLs  ¯ Assess for home care needs following discharge   ¯ Request OT consult to assist with ADL evaluation and planning for discharge  ¯ Provide patient education as appropriate  Outcome: Progressing    Goal: Maintain or return mobility status to optimal level  INTERVENTIONS:  - Assess patient's baseline mobility status (ambulation, transfers, stairs, etc )    - Identify cognitive and physical deficits and behaviors that affect mobility  - Identify mobility aids required to assist with transfers and/or ambulation (gait belt, sit-to-stand, lift, walker, cane, etc )  - [place] fall precautions as indicated by assessment  - Record patient progress and toleration of activity level on Mobility SBAR; progress patient to next Phase/Stage  - Instruct patient to call for assistance with activity based on assessment  - Request Rehabilitation consult to assist with strengthening/weightbearing, etc   Outcome: Progressing      Problem: DISCHARGE PLANNING  Goal: Discharge to home or other facility with appropriate resources  INTERVENTIONS:  - Identify barriers to discharge w/patient and caregiver  - Arrange for needed discharge resources and transportation as appropriate  - Identify discharge learning needs (meds, wound care, etc )  - Arrange for interpretive services to assist at discharge as needed  - Refer to Case Management Department for coordinating discharge planning if the patient needs post-hospital services based on physician/advanced practitioner order or complex needs related to functional status, cognitive ability, or social support system  Outcome: Progressing      Problem: Knowledge Deficit  Goal: Patient/family/caregiver demonstrates understanding of disease process, treatment plan, medications, and discharge instructions  Complete learning assessment and assess knowledge base    Interventions:  - Provide teaching at level of understanding  - Provide teaching via preferred learning methods  Outcome: Progressing

## 2018-09-05 NOTE — DISCHARGE INSTRUCTIONS
Please follow-up with Family doctor within 1 week  Pharyngitis   AMBULATORY CARE:   Pharyngitis , or sore throat, is inflammation of the tissues and structures in your pharynx (throat)  Pharyngitis is most often caused by bacteria  It may also be caused by a cold or flu virus  Other causes include smoking, allergies, or acid reflux  Signs and symptoms that may occur with pharyngitis:   · Sore throat or pain when you swallow    · Fever, chills, and body aches    · Hoarse or raspy voice    · Cough, runny or stuffy nose, itchy or watery eyes    · Headache    · Upset stomach and loss of appetite    · Mild neck stiffness    · Swollen glands that feel like hard lumps when you touch your neck    · White and yellow pus-filled blisters in the back of your throat  Call 911 for any of the following:   · You have trouble breathing or swallowing because your throat is swollen or sore  Seek care immediately if:   · You are drooling because it hurts too much to swallow  · Your fever is higher than 102? F (39?C) or lasts longer than 3 days  · You are confused  · You taste blood in your throat  Contact your healthcare provider if:   · Your throat pain gets worse  · You have a painful lump in your throat that does not go away after 5 days  · Your symptoms do not improve after 5 days  · You have questions or concerns about your condition or care  Treatment for pharyngitis:  Viral pharyngitis will go away on its own without treatment  Your sore throat should start to feel better in 3 to 5 days for both viral and bacterial infections  You may need any of the following:  · Antibiotics  treat a bacterial infection  · NSAIDs , such as ibuprofen, help decrease swelling, pain, and fever  NSAIDs can cause stomach bleeding or kidney problems in certain people  If you take blood thinner medicine, always ask your healthcare provider if NSAIDs are safe for you   Always read the medicine label and follow directions  · Acetaminophen  decreases pain and fever  It is available without a doctor's order  Ask how much to take and how often to take it  Follow directions  Acetaminophen can cause liver damage if not taken correctly  Manage your symptoms:   · Gargle salt water  Mix ¼ teaspoon salt in an 8 ounce glass of warm water and gargle  This may help decrease swelling in your throat  · Drink liquids as directed  You may need to drink more liquids than usual  Liquids may help soothe your throat and prevent dehydration  Ask how much liquid to drink each day and which liquids are best for you  · Use a cool-steam humidifier  to help moisten the air in your room and calm your cough  · Soothe your throat  with cough drops, ice, soft foods, or popsicles  Prevent the spread of pharyngitis:  Cover your mouth and nose when you cough or sneeze  Do not share food or drinks  Wash your hands often  Use soap and water  If soap and water are unavailable, use an alcohol based hand   Follow up with your healthcare provider as directed:  Write down your questions so you remember to ask them during your visits  © 2017 [facility] Information is for End User's use only and may not be sold, redistributed or otherwise used for commercial purposes  All illustrations and images included in CareNotes® are the copyrighted property of [employer] , [employer]  or [employer]ene[employer] Felipe  The above information is an  only  It is not intended as medical advice for individual conditions or treatments  Talk to your doctor, nurse or pharmacist before following any medical regimen to see if it is safe and effective for you

## 2018-09-09 LAB
BACTERIA BLD CULT: NORMAL
BACTERIA BLD CULT: NORMAL

## 2018-09-10 LAB
BACTERIA BLD CULT: NORMAL
BACTERIA BLD CULT: NORMAL

## 2018-09-13 ENCOUNTER — OFFICE VISIT (OUTPATIENT)
Dept: FAMILY MEDICINE CLINIC | Facility: CLINIC | Age: 38
End: 2018-09-13
Payer: COMMERCIAL

## 2018-09-13 ENCOUNTER — APPOINTMENT (OUTPATIENT)
Dept: LAB | Facility: HOSPITAL | Age: 38
End: 2018-09-13
Payer: COMMERCIAL

## 2018-09-13 VITALS
HEART RATE: 84 BPM | HEIGHT: 74 IN | TEMPERATURE: 98.1 F | DIASTOLIC BLOOD PRESSURE: 76 MMHG | RESPIRATION RATE: 16 BRPM | OXYGEN SATURATION: 98 % | SYSTOLIC BLOOD PRESSURE: 112 MMHG | BODY MASS INDEX: 24.64 KG/M2 | WEIGHT: 192 LBS

## 2018-09-13 DIAGNOSIS — Z76.89 ESTABLISHING CARE WITH NEW DOCTOR, ENCOUNTER FOR: Primary | ICD-10-CM

## 2018-09-13 DIAGNOSIS — R05.9 COUGH IN ADULT PATIENT: ICD-10-CM

## 2018-09-13 DIAGNOSIS — R61 SWEATING PROFUSELY: ICD-10-CM

## 2018-09-13 LAB
ALBUMIN SERPL BCP-MCNC: 4.1 G/DL (ref 3.5–5)
ALP SERPL-CCNC: 84 U/L (ref 46–116)
ALT SERPL W P-5'-P-CCNC: 38 U/L (ref 12–78)
ANION GAP SERPL CALCULATED.3IONS-SCNC: 11 MMOL/L (ref 4–13)
AST SERPL W P-5'-P-CCNC: 19 U/L (ref 5–45)
BILIRUB SERPL-MCNC: 0.2 MG/DL (ref 0.2–1)
BUN SERPL-MCNC: 10 MG/DL (ref 5–25)
CALCIUM SERPL-MCNC: 9.4 MG/DL (ref 8.3–10.1)
CHLORIDE SERPL-SCNC: 100 MMOL/L (ref 100–108)
CO2 SERPL-SCNC: 27 MMOL/L (ref 21–32)
CREAT SERPL-MCNC: 0.85 MG/DL (ref 0.6–1.3)
ERYTHROCYTE [DISTWIDTH] IN BLOOD BY AUTOMATED COUNT: 12.3 % (ref 11.6–15.1)
GFR SERPL CREATININE-BSD FRML MDRD: 112 ML/MIN/1.73SQ M
GLUCOSE SERPL-MCNC: 114 MG/DL (ref 65–140)
HCT VFR BLD AUTO: 43.7 % (ref 36.5–49.3)
HGB BLD-MCNC: 15.2 G/DL (ref 12–17)
MCH RBC QN AUTO: 30.6 PG (ref 26.8–34.3)
MCHC RBC AUTO-ENTMCNC: 34.8 G/DL (ref 31.4–37.4)
MCV RBC AUTO: 88 FL (ref 82–98)
PLATELET # BLD AUTO: 289 THOUSANDS/UL (ref 149–390)
PMV BLD AUTO: 9.9 FL (ref 8.9–12.7)
POTASSIUM SERPL-SCNC: 3.9 MMOL/L (ref 3.5–5.3)
PROT SERPL-MCNC: 7.8 G/DL (ref 6.4–8.2)
RBC # BLD AUTO: 4.97 MILLION/UL (ref 3.88–5.62)
SODIUM SERPL-SCNC: 138 MMOL/L (ref 136–145)
WBC # BLD AUTO: 8.11 THOUSAND/UL (ref 4.31–10.16)

## 2018-09-13 PROCEDURE — 36415 COLL VENOUS BLD VENIPUNCTURE: CPT

## 2018-09-13 PROCEDURE — 86665 EPSTEIN-BARR CAPSID VCA: CPT

## 2018-09-13 PROCEDURE — 86788 WEST NILE VIRUS AB IGM: CPT

## 2018-09-13 PROCEDURE — 86664 EPSTEIN-BARR NUCLEAR ANTIGEN: CPT

## 2018-09-13 PROCEDURE — 86789 WEST NILE VIRUS ANTIBODY: CPT

## 2018-09-13 PROCEDURE — 80053 COMPREHEN METABOLIC PANEL: CPT

## 2018-09-13 PROCEDURE — 86663 EPSTEIN-BARR ANTIBODY: CPT

## 2018-09-13 PROCEDURE — T1015 CLINIC SERVICE: HCPCS | Performed by: FAMILY MEDICINE

## 2018-09-13 PROCEDURE — 85027 COMPLETE CBC AUTOMATED: CPT

## 2018-09-13 PROCEDURE — 86480 TB TEST CELL IMMUN MEASURE: CPT

## 2018-09-13 RX ORDER — AZITHROMYCIN 250 MG/1
TABLET, FILM COATED ORAL
Qty: 6 TABLET | Refills: 0 | Status: SHIPPED | OUTPATIENT
Start: 2018-09-13 | End: 2018-09-13 | Stop reason: ALTCHOICE

## 2018-09-13 NOTE — PROGRESS NOTES
Assessment/Plan:     Diagnoses and all orders for this visit:    Establishing care with new doctor, encounter for    Sweating profusely    Cough in adult patient  Switched the patient's medication as he may have an atypical bacteria that wasn't grown on cultures  -     azithromycin (ZITHROMAX) 250 mg tablet; Take 2 tablets today then 1 tablet daily x 4 days  -     CBC and Platelet; Future  -     Comprehensive metabolic panel; Future  -     Quantiferon TB Gold; Future  -     EBV acute panel; Future  -     West Nile antibodies, IgG and IgM; Future      All questions and concerns were addressed and Kayley Recinos agrees to the plan  Case was discussed with Dr Sarah Valente    Subjective:      Patient ID: Milton Adamson is a 41 y o  male  Kayley Recinos was discharged from [address] [place] [address],[address] on 09/05/18 from having sepsis due to pharyngitis  He has continued to have a productive cough that has been constant throughout the day with <1 tsp of clear sputum  He also continues to feel fatigued and weak  He is complaining of excessively sweating especially at night especially  Cough   This is a chronic problem  The current episode started in the past 7 days  The problem has been unchanged (unchanged since discharge)  The cough is productive of sputum  Associated symptoms include chest pain, myalgias (along the left calf), nasal congestion, rhinorrhea and sweats  Nothing aggravates the symptoms  Risk factors for lung disease include smoking/tobacco exposure  Treatments tried: antibiotics  The treatment provided no relief  His past medical history is significant for environmental allergies (to wasp and bee stings)  Leg Pain        Review of Systems   Constitutional: Positive for activity change, appetite change and diaphoresis  HENT: Positive for rhinorrhea  Eyes: Negative  Respiratory: Positive for cough  Cardiovascular: Positive for chest pain  Negative for palpitations and leg swelling  Gastrointestinal: Negative  Endocrine: Positive for polyuria  Negative for polydipsia and polyphagia  Genitourinary: Positive for urgency  Musculoskeletal: Positive for myalgias (along the left calf)  Allergic/Immunologic: Positive for environmental allergies (to wasp and bee stings)  Neurological: Positive for weakness and light-headedness  Hematological: Negative  Psychiatric/Behavioral: Negative  Objective:      /76   Pulse 84   Temp 98 1 °F (36 7 °C)   Resp 16   Ht 6' 2" (1 88 m)   Wt 87 1 kg (192 lb)   SpO2 98%   BMI 24 65 kg/m²          Physical Exam   Constitutional: He is oriented to person, place, and time  He appears well-developed and well-nourished  HENT:   Head: Normocephalic and atraumatic  Right Ear: External ear normal    Left Ear: External ear normal    Nose: Nose normal    Mouth/Throat: Oropharynx is clear and moist    Eyes: Conjunctivae and EOM are normal  Pupils are equal, round, and reactive to light  Neck: Normal range of motion  Neck supple  No JVD present  No tracheal deviation present  No thyromegaly present  Cardiovascular: Normal rate, regular rhythm, normal heart sounds and intact distal pulses  Pulmonary/Chest: Effort normal and breath sounds normal  He has no wheezes  He has no rales  He exhibits no tenderness  Abdominal: Soft  Bowel sounds are normal  He exhibits no distension and no mass  There is no tenderness  There is no rebound and no guarding  Musculoskeletal: He exhibits no edema or tenderness  Lymphadenopathy:     He has no cervical adenopathy  Neurological: He is alert and oriented to person, place, and time  He has normal reflexes  Skin: He is diaphoretic  There is erythema (around the axillas bilaterally)  Hyperpigmented plaques under both axillas   Vitals reviewed

## 2018-09-15 LAB
ANNOTATION COMMENT IMP: NORMAL
EBV EA IGG SER-ACNC: <9 U/ML (ref 0–8.9)
EBV NA IGG SER IA-ACNC: 261 U/ML (ref 0–17.9)
EBV PATRN SPEC IB-IMP: ABNORMAL
EBV VCA IGG SER IA-ACNC: >600 U/ML (ref 0–17.9)
EBV VCA IGM SER IA-ACNC: <36 U/ML (ref 0–35.9)
GAMMA INTERFERON BACKGROUND BLD IA-ACNC: 0.04 IU/ML
M TB IFN-G BLD-IMP: NEGATIVE
M TB IFN-G CD4+ BCKGRND COR BLD-ACNC: 0 IU/ML
M TB IFN-G CD4+ T-CELLS BLD-ACNC: 0.04 IU/ML
MITOGEN IGNF BLD-ACNC: 7.58 IU/ML
QUANTIFERON-TB GOLD IN TUBE: NORMAL
SERVICE CMNT-IMP: NORMAL

## 2018-09-17 LAB
WNV IGG SER QL IA: NEGATIVE
WNV IGM SER QL IA: NEGATIVE

## 2018-09-28 ENCOUNTER — OFFICE VISIT (OUTPATIENT)
Dept: FAMILY MEDICINE CLINIC | Facility: CLINIC | Age: 38
End: 2018-09-28
Payer: COMMERCIAL

## 2018-09-28 VITALS
HEART RATE: 83 BPM | SYSTOLIC BLOOD PRESSURE: 104 MMHG | WEIGHT: 190 LBS | TEMPERATURE: 97.9 F | OXYGEN SATURATION: 97 % | RESPIRATION RATE: 16 BRPM | HEIGHT: 74 IN | DIASTOLIC BLOOD PRESSURE: 70 MMHG | BODY MASS INDEX: 24.38 KG/M2

## 2018-09-28 DIAGNOSIS — L72.0 CYST OF SKIN AND SUBCUTANEOUS TISSUE: ICD-10-CM

## 2018-09-28 DIAGNOSIS — R05.9 COUGH: Primary | ICD-10-CM

## 2018-09-28 DIAGNOSIS — R61 UNEXPLAINED NIGHT SWEATS: ICD-10-CM

## 2018-09-28 PROCEDURE — T1015 CLINIC SERVICE: HCPCS | Performed by: FAMILY MEDICINE

## 2018-09-28 RX ORDER — METHYLPREDNISOLONE 4 MG/1
TABLET ORAL
Qty: 21 TABLET | Refills: 0 | Status: SHIPPED | OUTPATIENT
Start: 2018-09-28 | End: 2018-10-02

## 2018-09-28 NOTE — PROGRESS NOTES
Assessment/Plan:     Diagnoses and all orders for this visit:    Cough  -     XR chest pa & lateral; Future  -     Methylprednisolone 4 MG TBPK; Use as directed on package    Cyst of skin and subcutaneous tissue    Unexplained night sweats  -     Lyme Antibody Profile with reflex to WB; Future      Discussion/Plan:  Cough - will order chest xray  z pack completed to cover atypical bacteria  Will send medrol dose adriana  Monitor sx and seek ED if symptoms acutely worsen  Rest, fluids, supportive measures  Unexplained night sweats - TB, EBV, West Nile, CBC, CMP normal  Will order Lyme testing due to history of tick bites and [place] area  Cyst - no acute infection  Advised keeping area clean and dry  F/U if area becomes erythematous, edematous, painful, or discharge  RTC 2 weeks for evaluation or sooner if needed  Subjective:      Patient ID: Charlene El is a 41 y o  male  Chief Complaint   Patient presents with    Cough    Muscle Pain     legs    Cyst     under arm, possible one back of head too       Patient is a 41year old male who presents to the office today for follow up  Patient was having cough with sputum, was placed on z pack and labs were ordered  Currently, patient reports he is still coughing  He has had dry coughing fits since last visit, today he started to bring up thick clear mucus  Patient reports chest tightness after coughing, night sweats, muscle aches/pains in calves  Patient denies headaches, ear pain, sore throat, abdominal pain, nausea, vomiting  He did have diarrhea with Z pack, not currently  Patient reports cyst under right arm that "burst" in the past  He states clear thin discharge was coming out, not currently  He denies erythema, edema, pain currently       He reports living in [facility] area and has had tick bites in the past          The following portions of the patient's history were reviewed and updated as appropriate: allergies, current medications, past family history, past medical history, past social history, past surgical history and problem list     Patient Active Problem List   Diagnosis    Pharyngitis    Fever    Pilonidal cyst without abscess    Cyst of skin and subcutaneous tissue     No current outpatient prescriptions on file prior to visit  No current facility-administered medications on file prior to visit  Review of Systems   Constitutional: Positive for diaphoresis and fatigue  HENT: Negative  Respiratory: Positive for cough and chest tightness  Cardiovascular: Negative  Gastrointestinal: Negative  Genitourinary: Negative  Musculoskeletal: Positive for myalgias  Skin: Positive for wound  Negative for color change  Neurological: Negative  Hematological: Negative  Objective:      /70   Pulse 83   Temp 97 9 °F (36 6 °C)   Resp 16   Ht 6' 2" (1 88 m)   Wt 86 2 kg (190 lb)   SpO2 97%   BMI 24 39 kg/m²          Physical Exam   Constitutional: He is oriented to person, place, and time  He appears well-developed and well-nourished  HENT:   Head: Normocephalic and atraumatic  Right Ear: Tympanic membrane, external ear and ear canal normal    Left Ear: Tympanic membrane, external ear and ear canal normal    Nose: Nose normal    Mouth/Throat: Oropharynx is clear and moist    Eyes: Pupils are equal, round, and reactive to light  Conjunctivae are normal    Neck: Neck supple  Cardiovascular: Normal rate and normal heart sounds  Pulmonary/Chest: Effort normal and breath sounds normal    Abdominal: Soft  Bowel sounds are normal    Lymphadenopathy:     He has no cervical adenopathy  Neurological: He is alert and oriented to person, place, and time  Skin: Skin is warm and dry  L underarm with palpable cyst  No erythema, edema, discharge  Psychiatric: He has a normal mood and affect   His behavior is normal

## 2018-10-02 ENCOUNTER — HOSPITAL ENCOUNTER (EMERGENCY)
Facility: HOSPITAL | Age: 38
Discharge: HOME | End: 2018-10-03
Attending: EMERGENCY MEDICINE
Payer: COMMERCIAL

## 2018-10-02 VITALS
HEART RATE: 72 BPM | BODY MASS INDEX: 24.37 KG/M2 | OXYGEN SATURATION: 98 % | TEMPERATURE: 97.8 F | RESPIRATION RATE: 18 BRPM | WEIGHT: 189.9 LBS | HEIGHT: 74 IN | DIASTOLIC BLOOD PRESSURE: 66 MMHG | SYSTOLIC BLOOD PRESSURE: 107 MMHG

## 2018-10-02 DIAGNOSIS — E87.6 HYPOKALEMIA: ICD-10-CM

## 2018-10-02 DIAGNOSIS — S86.819A STRAIN OF CALF MUSCLE, INITIAL ENCOUNTER: Primary | ICD-10-CM

## 2018-10-02 LAB
ALBUMIN SERPL BCP-MCNC: 3.6 G/DL (ref 3.5–5)
ALP SERPL-CCNC: 94 U/L (ref 46–116)
ALT SERPL W P-5'-P-CCNC: 21 U/L (ref 12–78)
ANION GAP SERPL CALCULATED.3IONS-SCNC: 10 MMOL/L (ref 4–13)
AST SERPL W P-5'-P-CCNC: 16 U/L (ref 5–45)
BASOPHILS # BLD AUTO: 0.04 THOUSANDS/ΜL (ref 0–0.1)
BASOPHILS NFR BLD AUTO: 0 % (ref 0–1)
BILIRUB SERPL-MCNC: 0.2 MG/DL (ref 0.2–1)
BUN SERPL-MCNC: 13 MG/DL (ref 5–25)
CALCIUM SERPL-MCNC: 8.8 MG/DL (ref 8.3–10.1)
CHLORIDE SERPL-SCNC: 104 MMOL/L (ref 100–108)
CO2 SERPL-SCNC: 25 MMOL/L (ref 21–32)
CREAT SERPL-MCNC: 1.01 MG/DL (ref 0.6–1.3)
EOSINOPHIL # BLD AUTO: 0.35 THOUSAND/ΜL (ref 0–0.61)
EOSINOPHIL NFR BLD AUTO: 4 % (ref 0–6)
ERYTHROCYTE [DISTWIDTH] IN BLOOD BY AUTOMATED COUNT: 12.1 % (ref 11.6–15.1)
GFR SERPL CREATININE-BSD FRML MDRD: 95 ML/MIN/1.73SQ M
GLUCOSE SERPL-MCNC: 112 MG/DL (ref 65–140)
HCT VFR BLD AUTO: 41.1 % (ref 36.5–49.3)
HGB BLD-MCNC: 14 G/DL (ref 12–17)
IMM GRANULOCYTES # BLD AUTO: 0.03 THOUSAND/UL (ref 0–0.2)
IMM GRANULOCYTES NFR BLD AUTO: 0 % (ref 0–2)
LYMPHOCYTES # BLD AUTO: 3.34 THOUSANDS/ΜL (ref 0.6–4.47)
LYMPHOCYTES NFR BLD AUTO: 34 % (ref 14–44)
MCH RBC QN AUTO: 30.2 PG (ref 26.8–34.3)
MCHC RBC AUTO-ENTMCNC: 34.1 G/DL (ref 31.4–37.4)
MCV RBC AUTO: 89 FL (ref 82–98)
MONOCYTES # BLD AUTO: 1.02 THOUSAND/ΜL (ref 0.17–1.22)
MONOCYTES NFR BLD AUTO: 10 % (ref 4–12)
NEUTROPHILS # BLD AUTO: 5.15 THOUSANDS/ΜL (ref 1.85–7.62)
NEUTS SEG NFR BLD AUTO: 52 % (ref 43–75)
NRBC BLD AUTO-RTO: 0 /100 WBCS
PLATELET # BLD AUTO: 215 THOUSANDS/UL (ref 149–390)
PMV BLD AUTO: 10.3 FL (ref 8.9–12.7)
POTASSIUM SERPL-SCNC: 3.3 MMOL/L (ref 3.5–5.3)
PROT SERPL-MCNC: 7.4 G/DL (ref 6.4–8.2)
RBC # BLD AUTO: 4.63 MILLION/UL (ref 3.88–5.62)
SODIUM SERPL-SCNC: 139 MMOL/L (ref 136–145)
WBC # BLD AUTO: 9.93 THOUSAND/UL (ref 4.31–10.16)

## 2018-10-02 PROCEDURE — 85025 COMPLETE CBC W/AUTO DIFF WBC: CPT | Performed by: EMERGENCY MEDICINE

## 2018-10-02 PROCEDURE — 36415 COLL VENOUS BLD VENIPUNCTURE: CPT | Performed by: EMERGENCY MEDICINE

## 2018-10-02 PROCEDURE — 99284 EMERGENCY DEPT VISIT MOD MDM: CPT

## 2018-10-02 PROCEDURE — 84443 ASSAY THYROID STIM HORMONE: CPT | Performed by: EMERGENCY MEDICINE

## 2018-10-02 PROCEDURE — 80053 COMPREHEN METABOLIC PANEL: CPT | Performed by: EMERGENCY MEDICINE

## 2018-10-03 ENCOUNTER — HOSPITAL ENCOUNTER (EMERGENCY)
Facility: HOSPITAL | Age: 38
Discharge: HOME | End: 2018-10-03
Attending: EMERGENCY MEDICINE | Admitting: EMERGENCY MEDICINE
Payer: COMMERCIAL

## 2018-10-03 ENCOUNTER — TELEPHONE (OUTPATIENT)
Dept: FAMILY MEDICINE CLINIC | Facility: CLINIC | Age: 38
End: 2018-10-03

## 2018-10-03 ENCOUNTER — APPOINTMENT (OUTPATIENT)
Dept: LAB | Facility: HOSPITAL | Age: 38
End: 2018-10-03
Payer: COMMERCIAL

## 2018-10-03 ENCOUNTER — APPOINTMENT (EMERGENCY)
Dept: ULTRASOUND IMAGING | Facility: HOSPITAL | Age: 38
End: 2018-10-03
Payer: COMMERCIAL

## 2018-10-03 ENCOUNTER — HOSPITAL ENCOUNTER (EMERGENCY)
Dept: RADIOLOGY | Facility: HOSPITAL | Age: 38
Discharge: HOME | End: 2018-10-03
Payer: COMMERCIAL

## 2018-10-03 VITALS
OXYGEN SATURATION: 96 % | TEMPERATURE: 97.9 F | WEIGHT: 189.82 LBS | RESPIRATION RATE: 18 BRPM | SYSTOLIC BLOOD PRESSURE: 141 MMHG | BODY MASS INDEX: 24.36 KG/M2 | HEART RATE: 73 BPM | DIASTOLIC BLOOD PRESSURE: 75 MMHG | HEIGHT: 74 IN

## 2018-10-03 DIAGNOSIS — R61 UNEXPLAINED NIGHT SWEATS: ICD-10-CM

## 2018-10-03 DIAGNOSIS — M79.604 BILATERAL LEG PAIN: Primary | ICD-10-CM

## 2018-10-03 DIAGNOSIS — R05.9 COUGH: ICD-10-CM

## 2018-10-03 DIAGNOSIS — M79.605 BILATERAL LEG PAIN: Primary | ICD-10-CM

## 2018-10-03 LAB — TSH SERPL DL<=0.05 MIU/L-ACNC: 1.74 UIU/ML (ref 0.36–3.74)

## 2018-10-03 PROCEDURE — 99283 EMERGENCY DEPT VISIT LOW MDM: CPT

## 2018-10-03 PROCEDURE — 86618 LYME DISEASE ANTIBODY: CPT

## 2018-10-03 PROCEDURE — 71046 X-RAY EXAM CHEST 2 VIEWS: CPT

## 2018-10-03 PROCEDURE — 36415 COLL VENOUS BLD VENIPUNCTURE: CPT

## 2018-10-03 PROCEDURE — 93970 EXTREMITY STUDY: CPT

## 2018-10-03 RX ORDER — POTASSIUM CHLORIDE 20 MEQ/1
20 TABLET, EXTENDED RELEASE ORAL ONCE
Status: COMPLETED | OUTPATIENT
Start: 2018-10-03 | End: 2018-10-03

## 2018-10-03 RX ORDER — POTASSIUM CHLORIDE 750 MG/1
10 TABLET, EXTENDED RELEASE ORAL 2 TIMES DAILY
Qty: 10 TABLET | Refills: 0 | Status: SHIPPED | OUTPATIENT
Start: 2018-10-03 | End: 2018-10-12

## 2018-10-03 RX ADMIN — POTASSIUM CHLORIDE 20 MEQ: 1500 TABLET, EXTENDED RELEASE ORAL at 00:24

## 2018-10-03 NOTE — ED PROVIDER NOTES
History  Chief Complaint   Patient presents with    Leg Pain     Pt reports bilateral lower leg pain for about a month that is getting worse  61-year-old male presents complaining of bilateral calf pain which been present for over a month  He states he has been intermittently having pain since the 1st week of September  He did not address this with his primary care doctor  He presents today because he is concerned that he may have blood clots  He states that he has got pain in both legs but the right calf is more tender anteriorly than the left calf  Patient has no swelling  History provided by:  Patient  Leg Pain   Lower extremity pain location: Bilateral calf pain  Time since incident: 1  Pain details:     Quality:  Cramping and throbbing    Severity:  Mild    Timing:  Constant  Chronicity:  New  Dislocation: no    Relieved by:  Nothing  Worsened by:  Nothing  Ineffective treatments:  None tried  Associated symptoms: no back pain, no decreased ROM and no fatigue    Risk factors: no concern for non-accidental trauma and no frequent fractures        None       Past Medical History:   Diagnosis Date    Sepsis ([facility] [address] )        Past Surgical History:   Procedure Laterality Date    NO PAST SURGERIES         History reviewed  No pertinent family history  I have reviewed and agree with the history as documented  Social History   Substance Use Topics    Smoking status: Current Every Day Smoker     Packs/day: 1 00     Types: Cigarettes    Smokeless tobacco: Never Used    Alcohol use Yes      Comment: socially        Review of Systems   Constitutional: Negative for fatigue  HENT: Negative for congestion and dental problem  Eyes: Negative for pain, discharge and itching  Respiratory: Negative for apnea, choking and chest tightness  Cardiovascular: Negative for chest pain and leg swelling  Gastrointestinal: Negative for abdominal distention, abdominal pain and anal bleeding     Endocrine: Negative for cold intolerance, heat intolerance and polydipsia  Genitourinary: Negative for difficulty urinating and dysuria  Musculoskeletal: Positive for gait problem  Negative for back pain  Skin: Negative for color change and pallor  Allergic/Immunologic: Negative for environmental allergies and food allergies  Neurological: Negative for dizziness, facial asymmetry, light-headedness and headaches  Hematological: Negative for adenopathy  Psychiatric/Behavioral: Negative for agitation, behavioral problems, confusion and decreased concentration  Physical Exam  Physical Exam   Constitutional: He appears well-developed and well-nourished  HENT:   Head: Normocephalic  Right Ear: External ear normal    Left Ear: External ear normal    Eyes: Pupils are equal, round, and reactive to light  Right eye exhibits no discharge  Left eye exhibits no discharge  Neck: Normal range of motion  No JVD present  No tracheal deviation present  No thyromegaly present  Cardiovascular: Exam reveals no friction rub  No murmur heard  Pulmonary/Chest: Effort normal  No respiratory distress  He has no wheezes  He has no rales  Abdominal: Soft  He exhibits no distension  There is no tenderness  Musculoskeletal: He exhibits tenderness  He exhibits no edema or deformity  Minimal tenderness to palpation bilateral anterior tibial regions, minimal tenderness to palpation bilateral posterior calves  Negative Homans sign bilaterally   Neurological: He is alert  He displays normal reflexes  No cranial nerve deficit  He exhibits normal muscle tone  Coordination normal    Skin: Skin is warm  Capillary refill takes less than 2 seconds  No erythema  Psychiatric: He has a normal mood and affect  His behavior is normal  Thought content normal    Vitals reviewed        Vital Signs  ED Triage Vitals [10/02/18 2313]   Temperature Pulse Respirations Blood Pressure SpO2   97 8 °F (36 6 °C) 72 18 107/66 98 %      Temp Source Heart Rate Source Patient Position - Orthostatic VS BP Location FiO2 (%)   Temporal Monitor Sitting Left arm --      Pain Score       8           Vitals:    10/02/18 2313   BP: 107/66   Pulse: 72   Patient Position - Orthostatic VS: Sitting       Visual Acuity      ED Medications  Medications   potassium chloride (K-DUR,KLOR-CON) CR tablet 20 mEq (20 mEq Oral Given 10/3/18 0024)       Diagnostic Studies  Results Reviewed     Procedure Component Value Units Date/Time    TSH [[id number]]  (Normal) Collected:  10/02/18 2336    Lab Status:  Final result Specimen:  Blood from Arm, Left Updated:  10/03/18 0010     TSH 3RD GENERATON 1 738 uIU/mL     Narrative:         Patients undergoing fluorescein dye angiography may retain small amounts of fluorescein in the body for 48-72 hours post procedure  Samples containing fluorescein can produce falsely depressed TSH values  If the patient had this procedure,a specimen should be resubmitted post fluorescein clearance  Comprehensive metabolic panel [[id number]]  (Abnormal) Collected:  10/02/18 2336    Lab Status:  Final result Specimen:  Blood from Arm, Left Updated:  10/02/18 2357     Sodium 139 mmol/L      Potassium 3 3 (L) mmol/L      Chloride 104 mmol/L      CO2 25 mmol/L      ANION GAP 10 mmol/L      BUN 13 mg/dL      Creatinine 1 01 mg/dL      Glucose 112 mg/dL      Calcium 8 8 mg/dL      AST 16 U/L      ALT 21 U/L      Alkaline Phosphatase 94 U/L      Total Protein 7 4 g/dL      Albumin 3 6 g/dL      Total Bilirubin 0 20 mg/dL      eGFR 95 ml/min/1 73sq m     Narrative:         National Kidney Disease Education Program recommendations are as follows:  GFR calculation is accurate only with a steady state creatinine  Chronic Kidney disease less than 60 ml/min/1 73 sq  meters  Kidney failure less than 15 ml/min/1 73 sq  meters      CBC and differential [[id number]] Collected:  10/02/18 2336    Lab Status:  Final result Specimen:  Blood from Arm, Left Updated:  10/02/18 2341     WBC 9 93 Thousand/uL      RBC 4 63 Million/uL      Hemoglobin 14 0 g/dL      Hematocrit 41 1 %      MCV 89 fL      MCH 30 2 pg      MCHC 34 1 g/dL      RDW 12 1 %      MPV 10 3 fL      Platelets 803 Thousands/uL      nRBC 0 /100 WBCs      Neutrophils Relative 52 %      Immat GRANS % 0 %      Lymphocytes Relative 34 %      Monocytes Relative 10 %      Eosinophils Relative 4 %      Basophils Relative 0 %      Neutrophils Absolute 5 15 Thousands/µL      Immature Grans Absolute 0 03 Thousand/uL      Lymphocytes Absolute 3 34 Thousands/µL      Monocytes Absolute 1 02 Thousand/µL      Eosinophils Absolute 0 35 Thousand/µL      Basophils Absolute 0 04 Thousands/µL                  No orders to display              Procedures  Procedures       Phone Contacts  ED Phone Contact    ED Course                               MDM  Number of Diagnoses or Management Options  Strain of calf muscle, initial encounter:   Diagnosis management comments: Differential diagnosis 1  DVT 2  Electrolyte imbalance 3  Musculoskeletal strain    23:47  We are unable to perform vascular exam because it was entered after 23:00s   I explained this to the patient  I did offer him Lovenox injection  Patient refused Lovenox injection at this time he knows that he is more than welcome to return at 0800 hours to have a venous duplex performed  Patient denies any injury to his lower extremities      CritCare Time    Disposition  Final diagnoses:   Strain of calf muscle, initial encounter   Hypokalemia     Time reflects when diagnosis was documented in both MDM as applicable and the Disposition within this note     Time User Action Codes Description Comment    10/2/2018 11:44 PM James Tomlinson Add [A45 483U] Strain of calf muscle, initial encounter     10/3/2018 12:24 AM James Tomlinson Add [E87 6] Hypokalemia       ED Disposition     ED Disposition Condition Comment    Discharge  Micaela Germain discharge to home/self care     Condition at discharge: Good        Follow-up Information     Follow up With Specialties Details Why Contact Info        Please return after 0800 hours to have venous duplex performed  Your refused Lovenox this evening          Patient's Medications   Discharge Prescriptions    POTASSIUM CHLORIDE (K-DUR,KLOR-CON) 10 MEQ TABLET    Take 1 tablet (10 mEq total) by mouth 2 (two) times a day for 5 days       Start Date: 10/3/2018 End Date: 10/8/2018       Order Dose: 10 mEq       Quantity: 10 tablet    Refills: 0     No discharge procedures on file      ED Provider  Electronically Signed by           Ame Amador DO  10/03/18 5945

## 2018-10-03 NOTE — DISCHARGE INSTRUCTIONS
Leg Pain   WHAT YOU NEED TO KNOW:   Leg pain may be caused by a variety of health conditions  Your tests did not show any broken bones or blood clots  DISCHARGE INSTRUCTIONS:   Return to the emergency department if:   · You have a fever  · Your leg starts to swell  · Your leg pain gets worse  · You have numbness or tingling in your leg or toes  · You cannot put any weight on or move your leg  Contact your healthcare provider if:   · Your pain does not decrease, even after treatment  · You have questions or concerns about your condition or care  Medicines:   · NSAIDs , such as ibuprofen, help decrease swelling, pain, and fever  This medicine is available with or without a doctor's order  NSAIDs can cause stomach bleeding or kidney problems in certain people  If you take blood thinner medicine, always ask your healthcare provider if NSAIDs are safe for you  Always read the medicine label and follow directions  · Take your medicine as directed  Contact your healthcare provider if you think your medicine is not helping or if you have side effects  Tell him of her if you are allergic to any medicine  Keep a list of the medicines, vitamins, and herbs you take  Include the amounts, and when and why you take them  Bring the list or the pill bottles to follow-up visits  Carry your medicine list with you in case of an emergency  Follow up with your healthcare provider as directed: You may need more tests to find the cause of your leg pain  You may need to see an orthopedic specialist or a physical therapist  Write down your questions so you remember to ask them during your visits  Manage your leg pain:   · Rest  your injured leg so that it can heal  You may need an immobilizer, brace, or splint to limit the movement of your leg  You may need to avoid putting any weight on your leg for at least 48 hours  Return to normal activities as directed      · Ice  the injury for 20 minutes every 4 hours for up to 24 hours, or as directed  Use an ice pack, or put crushed ice in a plastic bag  Cover it with a towel to protect your skin  Ice helps prevent tissue damage and decreases swelling and pain  · Elevate  your injured leg above the level of your heart as often as you can  This will help decrease swelling and pain  If possible, prop your leg on pillows or blankets to keep the area elevated comfortably  · Use assistive devices as directed  You may need to use a cane or crutches  Assistive devices help decrease pain and pressure on your leg when you walk  Ask your healthcare provider for more information about assistive devices and how to use them correctly  · Maintain a healthy weight  Extra body weight can cause pressure and pain in your hip, knee, and ankle joints  Ask your healthcare provider how much you should weigh  Ask him to help you create a weight loss plan if you are overweight  © 2017 [employer] Information is for End User's use only and may not be sold, redistributed or otherwise used for commercial purposes  All illustrations and images included in CareNotes® are the copyrighted property of [employer]  or [employer]ene[employer] Fel[employer]  The above information is an  only  It is not intended as medical advice for individual conditions or treatments  Talk to your doctor, nurse or pharmacist before following any medical regimen to see if it is safe and effective for you

## 2018-10-03 NOTE — DISCHARGE INSTRUCTIONS
Hypokalemia   WHAT YOU NEED TO KNOW:   Hypokalemia is a low level of potassium in your blood  Potassium helps control how your muscles, heart, and digestive system work  Hypokalemia occurs when your body loses too much potassium or does not absorb enough from food  DISCHARGE INSTRUCTIONS:   Return to the emergency department if:   · You cannot move your arm or leg  · You have a fast or irregular heartbeat  · You are too tired or weak to stand up  Contact your healthcare provider if:   · You are vomiting, or you have diarrhea  · You have numbness or tingling in your arms or legs  · Your symptoms do not go away or they get worse  · You have questions or concerns about your condition or care  Medicines:   · Potassium  will be given to bring your potassium levels back to normal     · Take your medicine as directed  Contact your healthcare provider if you think your medicine is not helping or if you have side effects  Tell him of her if you are allergic to any medicine  Keep a list of the medicines, vitamins, and herbs you take  Include the amounts, and when and why you take them  Bring the list or the pill bottles to follow-up visits  Carry your medicine list with you in case of an emergency  Eat foods that are high in potassium:  Foods that are high in potassium include bananas, oranges, tomatoes, potatoes, and avocado  Lynch beans, turkey, salmon, lean beef, yogurt, and milk are also high in potassium  Ask your healthcare provider or dietitian for more information about foods that are high in potassium  Follow up with your healthcare provider as directed:  Write down your questions so you remember to ask them during your visits  © 2017 [employer]  Information is for End User's use only and may not be sold, redistributed or otherwise used for commercial purposes   All illustrations and images included in CareNotes® are the copyrighted property of [employer]  or [employer]  The above information is an  only  It is not intended as medical advice for individual conditions or treatments  Talk to your doctor, nurse or pharmacist before following any medical regimen to see if it is safe and effective for you  Leg Pain   WHAT YOU NEED TO KNOW:   Leg pain may be caused by a variety of health conditions  Your tests did not show any broken bones or blood clots  DISCHARGE INSTRUCTIONS:   Return to the emergency department if:   · You have a fever  · Your leg starts to swell  · Your leg pain gets worse  · You have numbness or tingling in your leg or toes  · You cannot put any weight on or move your leg  Contact your healthcare provider if:   · Your pain does not decrease, even after treatment  · You have questions or concerns about your condition or care  Medicines:   · NSAIDs , such as ibuprofen, help decrease swelling, pain, and fever  This medicine is available with or without a doctor's order  NSAIDs can cause stomach bleeding or kidney problems in certain people  If you take blood thinner medicine, always ask your healthcare provider if NSAIDs are safe for you  Always read the medicine label and follow directions  · Take your medicine as directed  Contact your healthcare provider if you think your medicine is not helping or if you have side effects  Tell him of her if you are allergic to any medicine  Keep a list of the medicines, vitamins, and herbs you take  Include the amounts, and when and why you take them  Bring the list or the pill bottles to follow-up visits  Carry your medicine list with you in case of an emergency  Follow up with your healthcare provider as directed: You may need more tests to find the cause of your leg pain  You may need to see an orthopedic specialist or a physical therapist  Write down your questions so you remember to ask them during your visits    Manage your leg pain:   · Rest  your injured leg so that it can heal  You may need an immobilizer, brace, or splint to limit the movement of your leg  You may need to avoid putting any weight on your leg for at least 48 hours  Return to normal activities as directed  · Ice  the injury for 20 minutes every 4 hours for up to 24 hours, or as directed  Use an ice pack, or put crushed ice in a plastic bag  Cover it with a towel to protect your skin  Ice helps prevent tissue damage and decreases swelling and pain  · Elevate  your injured leg above the level of your heart as often as you can  This will help decrease swelling and pain  If possible, prop your leg on pillows or blankets to keep the area elevated comfortably  · Use assistive devices as directed  You may need to use a cane or crutches  Assistive devices help decrease pain and pressure on your leg when you walk  Ask your healthcare provider for more information about assistive devices and how to use them correctly  · Maintain a healthy weight  Extra body weight can cause pressure and pain in your hip, knee, and ankle joints  Ask your healthcare provider how much you should weigh  Ask him to help you create a weight loss plan if you are overweight  © 2017 [address] [facility] Information is for End User's use only and may not be sold, redistributed or otherwise used for commercial purposes  All illustrations and images included in CareNotes® are the copyrighted property of A D A [facility] , [employer]  or [employer]  The above information is an  only  It is not intended as medical advice for individual conditions or treatments  Talk to your doctor, nurse or pharmacist before following any medical regimen to see if it is safe and effective for you

## 2018-10-03 NOTE — ED PROVIDER NOTES
History  Chief Complaint   Patient presents with    Leg Pain     pt seen yesterday evening per leg pain and was told to return today to have us of legs done for possible clot  pt is      Patient presents to the emergency department today with family  He offers complaints of bilateral leg pain both anteriorly and posteriorly  This has been ongoing for about a month  Patient has concern for blood clot  He denies acute traumatic events  He states he is a  any smoke cigarettes  Family history of blood clots however no history of blood clotting disorders  Denies fevers  He notes pain with ambulation  Patient patient was here yesterday and did undergo laboratory workup  Prior to Admission Medications   Prescriptions Last Dose Informant Patient Reported? Taking? potassium chloride (K-DUR,KLOR-CON) 10 mEq tablet   No Yes   Sig: Take 1 tablet (10 mEq total) by mouth 2 (two) times a day for 5 days      Facility-Administered Medications: None       Past Medical History:   Diagnosis Date    [facility]eps[facility])        Past Surgical History:   Procedure Laterality Date    NO PAST SURGERIES         History reviewed  No pertinent family history  I have reviewed and agree with the history as documented  Social History   Substance Use Topics    Smoking status: Current Every Day Smoker     Packs/day: 1 00     Types: Cigarettes    Smokeless tobacco: Never Used    Alcohol use Yes      Comment: socially        Review of Systems   Constitutional: Negative  HENT: Negative  Eyes: Negative  Respiratory: Negative  Cardiovascular: Negative  Gastrointestinal: Negative  Endocrine: Negative  Genitourinary: Negative  Musculoskeletal:        Bilateral leg pain no edema   Skin: Negative  Allergic/Immunologic: Negative  Neurological: Negative  Hematological: Negative  Psychiatric/Behavioral: Negative  All other systems reviewed and are negative        Physical Exam  Physical Exam   Constitutional: He is oriented to person, place, and time  He appears well-developed and well-nourished  No distress  HENT:   Head: Normocephalic  Eyes: Pupils are equal, round, and reactive to light  Neck: Normal range of motion  Cardiovascular: Normal rate and regular rhythm  Pulmonary/Chest: Effort normal and breath sounds normal    Abdominal: Soft  Musculoskeletal: He exhibits tenderness  He exhibits no edema or deformity  Bilateral lower leg tenderness both anteriorly and posteriorly  Normal neurovascular motor exams distally  Neurological: He is alert and oriented to person, place, and time  Skin: Skin is warm  Capillary refill takes less than 2 seconds  He is not diaphoretic  Vitals reviewed  Vital Signs  ED Triage Vitals [10/03/18 1644]   Temperature Pulse Respirations Blood Pressure SpO2   97 9 °F (36 6 °C) 73 18 141/75 96 %      Temp Source Heart Rate Source Patient Position - Orthostatic VS BP Location FiO2 (%)   Temporal Monitor Sitting Left arm --      Pain Score       Worst Possible Pain           Vitals:    10/03/18 1644   BP: 141/75   Pulse: 73   Patient Position - Orthostatic VS: Sitting       Visual Acuity      ED Medications  Medications - No data to display    Diagnostic Studies  Results Reviewed     None                 VAS lower limb venous duplex study, complete bilateral    (Results Pending)              Procedures  Procedures       Phone Contacts  ED Phone Contact    ED Course  ED Course as of Oct 03 1828   Wed Oct 03, 2018   1825 Per ultrasound technologist no evidence of DVT                                  MDM  CritCare Time    Disposition  Final diagnoses:   Bilateral leg pain     Time reflects when diagnosis was documented in both MDM as applicable and the Disposition within this note     Time User Action Codes Description Comment    10/3/2018  6:28 PM Zbigniew Chamberlain Add [Z98 [id number],  [id number]] Bilateral leg pain       ED Disposition     ED Disposition Condition Comment    Discharge  Sunni Baugh discharge to home/self care  Condition at discharge: Good        Follow-up Information     Follow up With Specialties Details Why [id number] LINDEN Warren Physician Assistant Schedule an appointment as soon as possible for a visit  [address]  [phone]            Patient's Medications   Discharge Prescriptions    No medications on file     No discharge procedures on file      ED Provider  Electronically Signed by           Merlin Whitman PA-C  10/03/18 0020

## 2018-10-03 NOTE — ED NOTES
Patient does not want to receive the Lovenox injection at this time   He would rather get the ultrasound done first       Pricilla Ott RN  10/03/18 0025

## 2018-10-04 LAB
B BURGDOR IGG SER IA-ACNC: 0.05
B BURGDOR IGM SER IA-ACNC: 0.15

## 2018-10-04 PROCEDURE — 93970 EXTREMITY STUDY: CPT | Performed by: SURGERY

## 2018-10-12 ENCOUNTER — OFFICE VISIT (OUTPATIENT)
Dept: FAMILY MEDICINE CLINIC | Facility: CLINIC | Age: 38
End: 2018-10-12
Payer: COMMERCIAL

## 2018-10-12 VITALS
RESPIRATION RATE: 16 BRPM | DIASTOLIC BLOOD PRESSURE: 78 MMHG | WEIGHT: 187 LBS | BODY MASS INDEX: 24 KG/M2 | HEART RATE: 64 BPM | TEMPERATURE: 96.9 F | HEIGHT: 74 IN | OXYGEN SATURATION: 97 % | SYSTOLIC BLOOD PRESSURE: 118 MMHG

## 2018-10-12 DIAGNOSIS — M79.605 BILATERAL LEG PAIN: ICD-10-CM

## 2018-10-12 DIAGNOSIS — M79.605 BILATERAL LEG PAIN: Primary | ICD-10-CM

## 2018-10-12 DIAGNOSIS — L40.9 PSORIASIS: ICD-10-CM

## 2018-10-12 DIAGNOSIS — M79.604 BILATERAL LEG PAIN: ICD-10-CM

## 2018-10-12 DIAGNOSIS — M79.604 BILATERAL LEG PAIN: Primary | ICD-10-CM

## 2018-10-12 DIAGNOSIS — Z86.39 HISTORY OF HYPOKALEMIA: ICD-10-CM

## 2018-10-12 LAB
ANION GAP SERPL CALCULATED.3IONS-SCNC: 4 MMOL/L (ref 4–13)
BUN SERPL-MCNC: 11 MG/DL (ref 5–25)
CALCIUM SERPL-MCNC: 9.2 MG/DL (ref 8.3–10.1)
CHLORIDE SERPL-SCNC: 100 MMOL/L (ref 100–108)
CO2 SERPL-SCNC: 29 MMOL/L (ref 21–32)
CREAT SERPL-MCNC: 0.88 MG/DL (ref 0.6–1.3)
CRP SERPL QL: 15.7 MG/L
GFR SERPL CREATININE-BSD FRML MDRD: 111 ML/MIN/1.73SQ M
GLUCOSE P FAST SERPL-MCNC: 70 MG/DL (ref 65–99)
POTASSIUM SERPL-SCNC: 4.6 MMOL/L (ref 3.5–5.3)
SODIUM SERPL-SCNC: 133 MMOL/L (ref 136–145)

## 2018-10-12 PROCEDURE — 86430 RHEUMATOID FACTOR TEST QUAL: CPT | Performed by: FAMILY MEDICINE

## 2018-10-12 PROCEDURE — T1015 CLINIC SERVICE: HCPCS | Performed by: FAMILY MEDICINE

## 2018-10-12 PROCEDURE — 86140 C-REACTIVE PROTEIN: CPT | Performed by: FAMILY MEDICINE

## 2018-10-12 PROCEDURE — 86038 ANTINUCLEAR ANTIBODIES: CPT | Performed by: FAMILY MEDICINE

## 2018-10-12 PROCEDURE — 80048 BASIC METABOLIC PNL TOTAL CA: CPT | Performed by: FAMILY MEDICINE

## 2018-10-12 NOTE — PROGRESS NOTES
Assessment/Plan:     Diagnoses and all orders for this visit:    Bilateral leg pain  -     MANDI Screen w/ Reflex to Titer/Pattern; Future  -     RF Screen w/ Reflex to Titer; Future  -     Sedimentation rate, automated; Future  -     C-reactive protein; Future  -     diclofenac sodium (VOLTAREN) 50 mg EC tablet; Take 1 tablet (50 mg total) by mouth 2 (two) times a day    History of hypokalemia  -     Basic metabolic panel; Future    Psoriasis  -     MANDI Screen w/ Reflex to Titer/Pattern; Future  -     RF Screen w/ Reflex to Titer; Future  -     Sedimentation rate, automated; Future  -     C-reactive protein; Future      Discussion/Plan:  Bilateral leg pain/psoriasis - diclofenac BID with food  Labs ordered  Hypokalemia - repeat BMP ordered  RTC for next scheduled f/u or sooner if needed  Subjective:      Patient ID: Zachariah Lovelace is a 41 y o  male  Chief Complaint   Patient presents with    Leg Pain     bilateral       Patient is a 41year old male who presents to the office today for bilateral leg pain  Patient reports bilateral anterior leg pain for about one month  He went to ED for this and was found to have hypokalemia  He describes leg pain as "like bones are moving around" when he is walking on legs  He states when he is sitting, he describes pain as "muscle ache " He states steroids helped the pain, but is no longer taking  He reports history of psoriasis           The following portions of the patient's history were reviewed and updated as appropriate: allergies, current medications, past family history, past medical history, past social history, past surgical history and problem list     Patient Active Problem List   Diagnosis    Pharyngitis    Fever    Pilonidal cyst without abscess    Cyst of skin and subcutaneous tissue     Current Outpatient Prescriptions on File Prior to Visit   Medication Sig Dispense Refill    [DISCONTINUED] potassium chloride (K-DUR,KLOR-CON) 10 mEq tablet Take 1 tablet (10 mEq total) by mouth 2 (two) times a day for 5 days 10 tablet 0     No current facility-administered medications on file prior to visit  Review of Systems   Constitutional: Negative  Respiratory: Negative  Cardiovascular: Negative  Gastrointestinal: Negative  Musculoskeletal: Positive for arthralgias and myalgias  Skin: Positive for rash  Neurological: Negative  Psychiatric/Behavioral: Negative  Objective:       /78   Pulse 64   Temp (!) 96 9 °F (36 1 °C)   Resp 16   Ht 6' 2" (1 88 m)   Wt 84 8 kg (187 lb)   SpO2 97%   BMI 24 01 kg/m²          Physical Exam   Constitutional: He is oriented to person, place, and time  He appears well-developed and well-nourished  HENT:   Mouth/Throat: Oropharynx is clear and moist    Eyes: Pupils are equal, round, and reactive to light  Conjunctivae are normal    Neck: Neck supple  Cardiovascular: Normal rate, regular rhythm and normal heart sounds  Pulmonary/Chest: Effort normal and breath sounds normal    Musculoskeletal: He exhibits tenderness  Tenderness to palpation anterior lower legs bilaterally   Neurological: He is alert and oriented to person, place, and time  He has normal reflexes  Skin: Skin is warm and dry  Rash noted  Raised pink plaques with scaling present on lower extremities bilaterally   Psychiatric: He has a normal mood and affect   His behavior is normal

## 2018-10-13 ENCOUNTER — TRANSCRIBE ORDERS (OUTPATIENT)
Dept: ADMINISTRATIVE | Facility: HOSPITAL | Age: 38
End: 2018-10-13

## 2018-10-13 ENCOUNTER — APPOINTMENT (OUTPATIENT)
Dept: LAB | Facility: HOSPITAL | Age: 38
End: 2018-10-13
Payer: COMMERCIAL

## 2018-10-13 DIAGNOSIS — M79.604 RIGHT LEG PAIN: ICD-10-CM

## 2018-10-13 DIAGNOSIS — M79.604 RIGHT LEG PAIN: Primary | ICD-10-CM

## 2018-10-13 DIAGNOSIS — M79.605 LEFT LEG PAIN: ICD-10-CM

## 2018-10-13 LAB — ERYTHROCYTE [SEDIMENTATION RATE] IN BLOOD: 2 MM/HOUR (ref 0–10)

## 2018-10-13 PROCEDURE — 85652 RBC SED RATE AUTOMATED: CPT

## 2018-10-13 PROCEDURE — 36415 COLL VENOUS BLD VENIPUNCTURE: CPT

## 2018-10-15 DIAGNOSIS — M79.10 MYALGIA: Primary | ICD-10-CM

## 2018-10-15 DIAGNOSIS — R79.82 ELEVATED C-REACTIVE PROTEIN (CRP): ICD-10-CM

## 2018-10-15 DIAGNOSIS — L40.9 PSORIASIS: ICD-10-CM

## 2018-10-15 LAB
RHEUMATOID FACT SER QL LA: NEGATIVE
RYE IGE QN: NEGATIVE

## 2019-01-02 ENCOUNTER — OFFICE VISIT (OUTPATIENT)
Dept: FAMILY MEDICINE CLINIC | Facility: CLINIC | Age: 39
End: 2019-01-02
Payer: COMMERCIAL

## 2019-01-02 VITALS
OXYGEN SATURATION: 98 % | BODY MASS INDEX: 24.64 KG/M2 | WEIGHT: 192 LBS | DIASTOLIC BLOOD PRESSURE: 76 MMHG | TEMPERATURE: 96.2 F | RESPIRATION RATE: 16 BRPM | SYSTOLIC BLOOD PRESSURE: 118 MMHG | HEIGHT: 74 IN | HEART RATE: 60 BPM

## 2019-01-02 DIAGNOSIS — F33.0 MILD EPISODE OF RECURRENT MAJOR DEPRESSIVE DISORDER (HCC): Primary | ICD-10-CM

## 2019-01-02 PROCEDURE — T1015 CLINIC SERVICE: HCPCS | Performed by: FAMILY MEDICINE

## 2019-01-02 RX ORDER — HYDROXYZINE HYDROCHLORIDE 10 MG/1
10 TABLET, FILM COATED ORAL
Qty: 30 TABLET | Refills: 1 | Status: SHIPPED | OUTPATIENT
Start: 2019-01-02 | End: 2019-10-07

## 2019-01-02 RX ORDER — FLUOXETINE HYDROCHLORIDE 20 MG/1
20 CAPSULE ORAL DAILY
Qty: 30 CAPSULE | Refills: 1 | Status: SHIPPED | OUTPATIENT
Start: 2019-01-02 | End: 2019-10-07

## 2019-01-02 NOTE — PATIENT INSTRUCTIONS
Depression with anxiety - restart prozac 20mg daily  Hydroxyzine at bedtime as needed for trouble sleeping  Start daily exercise, eat healthy, relaxation techniques, engage in enjoyable activities to improve mood and reduce anxiety  Call crisis or seek ED if suicidal or homicidal thoughts  F/u 6 weeks for recheck

## 2019-01-02 NOTE — PROGRESS NOTES
Assessment/Plan:     Diagnoses and all orders for this visit:    Mild episode of recurrent major depressive disorder (HCC)  -     FLUoxetine (PROzac) 20 mg capsule; Take 1 capsule (20 mg total) by mouth daily  -     hydrOXYzine HCL (ATARAX) 10 mg tablet; Take 1 tablet (10 mg total) by mouth daily at bedtime        Discussion/Plan:  Depression with anxiety - restart prozac 20mg daily  Hydroxyzine at bedtime as needed for trouble sleeping until SSRI reaches full effect  Start daily exercise, eat healthy, relaxation techniques, engage in enjoyable activities to improve mood and reduce anxiety  Call crisis or seek ED if SI/HI/AH/VH/manic sx  RTC 6 weeks for symptom evaluation or sooner if needed  Subjective:      Patient ID: Damaris Rojo is a 41 y o  male  Chief Complaint   Patient presents with    Depression     wants to get back on depression medications       Patient is a 41year old male who presents to the office today for depression  He reports history of depression and anxiety  He was seeing psychiatry in the past, has old prescription bottles with him today  He was on prozac, ritalin, and tegretol  He is not interested in seeing psychiatry at this time  Currently, he reports he has been feeling more depressed over the past month  He states depression was really triggered by family issues  He reports grandfather  in [de-identified] and is currently in [place] with family  He reports trouble falling asleep, but feeling tired at the time  He reports anhedonia, feeling of guilt, decreased concentration, feeling down, decreased appetite, easily agitated  He denies SI/HI/AH/VH/manic sx             The following portions of the patient's history were reviewed and updated as appropriate: allergies, current medications, past family history, past medical history, past social history, past surgical history and problem list     Patient Active Problem List   Diagnosis    Pharyngitis    Fever    Pilonidal cyst without abscess    Cyst of skin and subcutaneous tissue     Current Outpatient Prescriptions on File Prior to Visit   Medication Sig Dispense Refill    [DISCONTINUED] diclofenac sodium (VOLTAREN) 50 mg EC tablet Take 1 tablet (50 mg total) by mouth 2 (two) times a day 60 tablet 0     No current facility-administered medications on file prior to visit  Review of Systems   Constitutional: Positive for activity change and appetite change  Respiratory: Negative  Cardiovascular: Negative  Gastrointestinal: Negative  Neurological: Negative  Psychiatric/Behavioral: Positive for agitation, decreased concentration, dysphoric mood and sleep disturbance  Negative for behavioral problems, confusion, hallucinations, self-injury and suicidal ideas  The patient is nervous/anxious  The patient is not hyperactive  Objective:      /76   Pulse 60   Temp (!) 96 2 °F (35 7 °C)   Resp 16   Ht 6' 2" (1 88 m)   Wt 87 1 kg (192 lb)   SpO2 98%   BMI 24 65 kg/m²          Physical Exam   Constitutional: He appears well-developed and well-nourished  HENT:   Head: Normocephalic and atraumatic  Right Ear: Tympanic membrane, external ear and ear canal normal    Left Ear: Tympanic membrane, external ear and ear canal normal    Nose: Nose normal    Mouth/Throat: Oropharynx is clear and moist    Eyes: Pupils are equal, round, and reactive to light  Conjunctivae are normal    Neck: Neck supple  Cardiovascular: Normal rate, regular rhythm and normal heart sounds  Pulmonary/Chest: Breath sounds normal    Abdominal: Soft  Bowel sounds are normal  There is no tenderness  Neurological: He is alert  Skin: Skin is warm and dry  Psychiatric: His speech is normal and behavior is normal  Judgment normal  His mood appears not anxious  His affect is not angry, not blunt, not labile and not inappropriate   Thought content is not paranoid and not delusional  Cognition and memory are normal  He exhibits a depressed mood  He expresses no homicidal and no suicidal ideation  He expresses no suicidal plans and no homicidal plans

## 2019-02-01 ENCOUNTER — TELEPHONE (OUTPATIENT)
Dept: FAMILY MEDICINE CLINIC | Facility: CLINIC | Age: 39
End: 2019-02-01

## 2019-02-01 NOTE — TELEPHONE ENCOUNTER
CALLED PATIENT TO SCHEDULE AN APPT FOR HIM WITH CALLIE MULLEN PA-C FOR F/U SINCE LI BRYANT PA-C WILL BE MOVING TO THE [facility] OFFICE  AT THE TIME OF MY CALL PT SAID HE JUST LEARNED THAT HIS GRANDMOTHER PASSED AWAY AND HE SAID HE WILL GET BACK TO US TO SET UP A N EW APPT

## 2019-04-24 ENCOUNTER — HOSPITAL ENCOUNTER (EMERGENCY)
Facility: HOSPITAL | Age: 39
Discharge: HOME | End: 2019-04-24
Attending: EMERGENCY MEDICINE | Admitting: EMERGENCY MEDICINE
Payer: COMMERCIAL

## 2019-04-24 VITALS
SYSTOLIC BLOOD PRESSURE: 151 MMHG | WEIGHT: 192.02 LBS | HEART RATE: 69 BPM | TEMPERATURE: 98.7 F | RESPIRATION RATE: 18 BRPM | OXYGEN SATURATION: 97 % | DIASTOLIC BLOOD PRESSURE: 81 MMHG | BODY MASS INDEX: 24.65 KG/M2

## 2019-04-24 DIAGNOSIS — K04.7 DENTAL INFECTION: ICD-10-CM

## 2019-04-24 DIAGNOSIS — T14.8XXA ABRASION OF SKIN: ICD-10-CM

## 2019-04-24 DIAGNOSIS — Z51.89 VISIT FOR WOUND CHECK: Primary | ICD-10-CM

## 2019-04-24 PROCEDURE — 90715 TDAP VACCINE 7 YRS/> IM: CPT | Performed by: EMERGENCY MEDICINE

## 2019-04-24 PROCEDURE — 99283 EMERGENCY DEPT VISIT LOW MDM: CPT | Performed by: EMERGENCY MEDICINE

## 2019-04-24 PROCEDURE — 90471 IMMUNIZATION ADMIN: CPT

## 2019-04-24 PROCEDURE — 99282 EMERGENCY DEPT VISIT SF MDM: CPT

## 2019-04-24 RX ORDER — CLINDAMYCIN HYDROCHLORIDE 300 MG/1
300 CAPSULE ORAL EVERY 8 HOURS SCHEDULED
Qty: 21 CAPSULE | Refills: 0 | Status: SHIPPED | OUTPATIENT
Start: 2019-04-24 | End: 2019-05-01

## 2019-04-24 RX ORDER — CLINDAMYCIN HYDROCHLORIDE 150 MG/1
300 CAPSULE ORAL ONCE
Status: COMPLETED | OUTPATIENT
Start: 2019-04-24 | End: 2019-04-24

## 2019-04-24 RX ORDER — NAPROXEN 500 MG/1
500 TABLET ORAL ONCE
Status: COMPLETED | OUTPATIENT
Start: 2019-04-24 | End: 2019-04-24

## 2019-04-24 RX ORDER — NAPROXEN 500 MG/1
500 TABLET ORAL 2 TIMES DAILY WITH MEALS
Qty: 14 TABLET | Refills: 0 | Status: SHIPPED | OUTPATIENT
Start: 2019-04-24 | End: 2019-10-07

## 2019-04-24 RX ADMIN — CLINDAMYCIN HYDROCHLORIDE 300 MG: 150 CAPSULE ORAL at 12:19

## 2019-04-24 RX ADMIN — TETANUS TOXOID, REDUCED DIPHTHERIA TOXOID AND ACELLULAR PERTUSSIS VACCINE, ADSORBED 0.5 ML: 5; 2.5; 8; 8; 2.5 SUSPENSION INTRAMUSCULAR at 12:20

## 2019-04-24 RX ADMIN — NAPROXEN 500 MG: 500 TABLET ORAL at 12:25

## 2019-10-01 ENCOUNTER — HOSPITAL ENCOUNTER (EMERGENCY)
Facility: HOSPITAL | Age: 39
Discharge: HOME | End: 2019-10-01
Attending: EMERGENCY MEDICINE
Payer: COMMERCIAL

## 2019-10-01 ENCOUNTER — APPOINTMENT (EMERGENCY)
Dept: RADIOLOGY | Facility: HOSPITAL | Age: 39
End: 2019-10-01
Payer: COMMERCIAL

## 2019-10-01 ENCOUNTER — OFFICE VISIT (OUTPATIENT)
Dept: URGENT CARE | Facility: CLINIC | Age: 39
End: 2019-10-01
Payer: COMMERCIAL

## 2019-10-01 VITALS
BODY MASS INDEX: 24.95 KG/M2 | HEIGHT: 74 IN | WEIGHT: 194.45 LBS | HEART RATE: 58 BPM | OXYGEN SATURATION: 99 % | SYSTOLIC BLOOD PRESSURE: 135 MMHG | RESPIRATION RATE: 17 BRPM | TEMPERATURE: 98.6 F | DIASTOLIC BLOOD PRESSURE: 79 MMHG

## 2019-10-01 VITALS
HEIGHT: 74 IN | RESPIRATION RATE: 20 BRPM | DIASTOLIC BLOOD PRESSURE: 71 MMHG | TEMPERATURE: 97.7 F | HEART RATE: 66 BPM | SYSTOLIC BLOOD PRESSURE: 131 MMHG | WEIGHT: 190 LBS | BODY MASS INDEX: 24.38 KG/M2 | OXYGEN SATURATION: 97 %

## 2019-10-01 DIAGNOSIS — R07.9 CHEST PAIN: Primary | ICD-10-CM

## 2019-10-01 DIAGNOSIS — R07.9 CHEST PAIN, UNSPECIFIED TYPE: Primary | ICD-10-CM

## 2019-10-01 LAB
ALBUMIN SERPL BCP-MCNC: 3.9 G/DL (ref 3.5–5)
ALP SERPL-CCNC: 70 U/L (ref 46–116)
ALT SERPL W P-5'-P-CCNC: 14 U/L (ref 12–78)
AMPHETAMINES SERPL QL SCN: NEGATIVE
ANION GAP SERPL CALCULATED.3IONS-SCNC: 9 MMOL/L (ref 4–13)
APTT PPP: 33 SECONDS (ref 23–37)
AST SERPL W P-5'-P-CCNC: 20 U/L (ref 5–45)
BARBITURATES UR QL: NEGATIVE
BASOPHILS # BLD AUTO: 0.03 THOUSANDS/ΜL (ref 0–0.1)
BASOPHILS NFR BLD AUTO: 0 % (ref 0–1)
BENZODIAZ UR QL: NEGATIVE
BILIRUB SERPL-MCNC: 0.4 MG/DL (ref 0.2–1)
BILIRUB UR QL STRIP: NEGATIVE
BUN SERPL-MCNC: 8 MG/DL (ref 5–25)
CALCIUM SERPL-MCNC: 8.7 MG/DL (ref 8.3–10.1)
CHLORIDE SERPL-SCNC: 103 MMOL/L (ref 100–108)
CLARITY UR: CLEAR
CO2 SERPL-SCNC: 28 MMOL/L (ref 21–32)
COCAINE UR QL: NEGATIVE
COLOR UR: YELLOW
CREAT SERPL-MCNC: 0.97 MG/DL (ref 0.6–1.3)
DEPRECATED D DIMER PPP: <270 NG/ML (FEU)
EOSINOPHIL # BLD AUTO: 0.11 THOUSAND/ΜL (ref 0–0.61)
EOSINOPHIL NFR BLD AUTO: 2 % (ref 0–6)
ERYTHROCYTE [DISTWIDTH] IN BLOOD BY AUTOMATED COUNT: 11.9 % (ref 11.6–15.1)
GFR SERPL CREATININE-BSD FRML MDRD: 99 ML/MIN/1.73SQ M
GLUCOSE SERPL-MCNC: 99 MG/DL (ref 65–140)
GLUCOSE UR STRIP-MCNC: NEGATIVE MG/DL
HCT VFR BLD AUTO: 44.3 % (ref 36.5–49.3)
HGB BLD-MCNC: 15 G/DL (ref 12–17)
HGB UR QL STRIP.AUTO: NEGATIVE
IMM GRANULOCYTES # BLD AUTO: 0.03 THOUSAND/UL (ref 0–0.2)
IMM GRANULOCYTES NFR BLD AUTO: 0 % (ref 0–2)
INR PPP: 1.01 (ref 0.84–1.19)
KETONES UR STRIP-MCNC: NEGATIVE MG/DL
LACTATE SERPL-SCNC: 2 MMOL/L (ref 0.5–2)
LEUKOCYTE ESTERASE UR QL STRIP: NEGATIVE
LIPASE SERPL-CCNC: 46 U/L (ref 73–393)
LYMPHOCYTES # BLD AUTO: 2.44 THOUSANDS/ΜL (ref 0.6–4.47)
LYMPHOCYTES NFR BLD AUTO: 33 % (ref 14–44)
MAGNESIUM SERPL-MCNC: 2 MG/DL (ref 1.6–2.6)
MCH RBC QN AUTO: 31.3 PG (ref 26.8–34.3)
MCHC RBC AUTO-ENTMCNC: 33.9 G/DL (ref 31.4–37.4)
MCV RBC AUTO: 92 FL (ref 82–98)
METHADONE UR QL: NEGATIVE
MONOCYTES # BLD AUTO: 0.68 THOUSAND/ΜL (ref 0.17–1.22)
MONOCYTES NFR BLD AUTO: 9 % (ref 4–12)
NEUTROPHILS # BLD AUTO: 4.07 THOUSANDS/ΜL (ref 1.85–7.62)
NEUTS SEG NFR BLD AUTO: 56 % (ref 43–75)
NITRITE UR QL STRIP: NEGATIVE
NRBC BLD AUTO-RTO: 0 /100 WBCS
OPIATES UR QL SCN: NEGATIVE
PCP UR QL: NEGATIVE
PH UR STRIP.AUTO: 7 [PH]
PLATELET # BLD AUTO: 208 THOUSANDS/UL (ref 149–390)
PMV BLD AUTO: 10.7 FL (ref 8.9–12.7)
POTASSIUM SERPL-SCNC: 4.4 MMOL/L (ref 3.5–5.3)
PROT SERPL-MCNC: 7.4 G/DL (ref 6.4–8.2)
PROT UR STRIP-MCNC: NEGATIVE MG/DL
PROTHROMBIN TIME: 13.3 SECONDS (ref 11.6–14.5)
RBC # BLD AUTO: 4.8 MILLION/UL (ref 3.88–5.62)
SODIUM SERPL-SCNC: 140 MMOL/L (ref 136–145)
SP GR UR STRIP.AUTO: 1.01 (ref 1–1.03)
THC UR QL: NEGATIVE
TROPONIN I SERPL-MCNC: <0.02 NG/ML
TROPONIN I SERPL-MCNC: <0.02 NG/ML
TSH SERPL DL<=0.05 MIU/L-ACNC: 1.73 UIU/ML (ref 0.36–3.74)
UROBILINOGEN UR QL STRIP.AUTO: 0.2 E.U./DL
WBC # BLD AUTO: 7.36 THOUSAND/UL (ref 4.31–10.16)

## 2019-10-01 PROCEDURE — 83690 ASSAY OF LIPASE: CPT | Performed by: PHYSICIAN ASSISTANT

## 2019-10-01 PROCEDURE — 93005 ELECTROCARDIOGRAM TRACING: CPT

## 2019-10-01 PROCEDURE — 84484 ASSAY OF TROPONIN QUANT: CPT | Performed by: PHYSICIAN ASSISTANT

## 2019-10-01 PROCEDURE — 84443 ASSAY THYROID STIM HORMONE: CPT | Performed by: PHYSICIAN ASSISTANT

## 2019-10-01 PROCEDURE — 83735 ASSAY OF MAGNESIUM: CPT | Performed by: PHYSICIAN ASSISTANT

## 2019-10-01 PROCEDURE — 87040 BLOOD CULTURE FOR BACTERIA: CPT | Performed by: PHYSICIAN ASSISTANT

## 2019-10-01 PROCEDURE — 99285 EMERGENCY DEPT VISIT HI MDM: CPT

## 2019-10-01 PROCEDURE — 85610 PROTHROMBIN TIME: CPT | Performed by: PHYSICIAN ASSISTANT

## 2019-10-01 PROCEDURE — 96360 HYDRATION IV INFUSION INIT: CPT

## 2019-10-01 PROCEDURE — 80053 COMPREHEN METABOLIC PANEL: CPT | Performed by: PHYSICIAN ASSISTANT

## 2019-10-01 PROCEDURE — 71046 X-RAY EXAM CHEST 2 VIEWS: CPT

## 2019-10-01 PROCEDURE — 81003 URINALYSIS AUTO W/O SCOPE: CPT | Performed by: PHYSICIAN ASSISTANT

## 2019-10-01 PROCEDURE — 83605 ASSAY OF LACTIC ACID: CPT | Performed by: PHYSICIAN ASSISTANT

## 2019-10-01 PROCEDURE — 85025 COMPLETE CBC W/AUTO DIFF WBC: CPT | Performed by: PHYSICIAN ASSISTANT

## 2019-10-01 PROCEDURE — 99285 EMERGENCY DEPT VISIT HI MDM: CPT | Performed by: PHYSICIAN ASSISTANT

## 2019-10-01 PROCEDURE — 99213 OFFICE O/P EST LOW 20 MIN: CPT | Performed by: PHYSICIAN ASSISTANT

## 2019-10-01 PROCEDURE — S9088 SERVICES PROVIDED IN URGENT: HCPCS | Performed by: PHYSICIAN ASSISTANT

## 2019-10-01 PROCEDURE — 80307 DRUG TEST PRSMV CHEM ANLYZR: CPT | Performed by: PHYSICIAN ASSISTANT

## 2019-10-01 PROCEDURE — 85730 THROMBOPLASTIN TIME PARTIAL: CPT | Performed by: PHYSICIAN ASSISTANT

## 2019-10-01 PROCEDURE — 85379 FIBRIN DEGRADATION QUANT: CPT | Performed by: PHYSICIAN ASSISTANT

## 2019-10-01 PROCEDURE — 36415 COLL VENOUS BLD VENIPUNCTURE: CPT | Performed by: PHYSICIAN ASSISTANT

## 2019-10-01 RX ORDER — ASPIRIN 81 MG/1
324 TABLET, CHEWABLE ORAL DAILY
Status: SHIPPED | OUTPATIENT
Start: 2019-10-01

## 2019-10-01 RX ADMIN — SODIUM CHLORIDE 1000 ML: 0.9 INJECTION, SOLUTION INTRAVENOUS at 11:15

## 2019-10-01 RX ADMIN — ASPIRIN 324 MG: 81 TABLET, CHEWABLE ORAL at 10:00

## 2019-10-01 NOTE — ED PROVIDER NOTES
History  Chief Complaint   Patient presents with    Chest Pain     Chest pain for about 4 weeks, worsening yesterday, sent from [facility]     Patient presents to the emergency department today for evaluation of chest pain  He presents via both basic as well as advanced life support emergency medical services  He was seen at an urgent care today who thought that he had EKG changes with chest pain  He was given 324 mg of aspirin at the initial facility  Pre-hospital advanced life support emergency medical services administered 0 4 mg sublingual nitroglycerin as well  Patient at bedside appears slightly pale  He is alert orient x4 provides an adequate history  He states he has been feeling well for 1-2 months  He finds himself more tired than normal   He states over the last few days he has had continuous left-sided chest pain radiating into his neck and down his left arm  He thought it noted some motor deficits of the left arm yesterday  Patient states his employer thinks he is doing drugs and wanted to send him for a drug test   Patient also states there is issues at home with his children  He states he does feel increased pain left-sided chest when he takes a deep breath  He also notes shortness of breath  He is an everyday smoker  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 Taking?    FLUoxetine (PROzac) 20 mg capsule   No No   Sig: Take 1 capsule (20 mg total) by mouth daily   Patient not taking: Reported on 10/1/2019   hydrOXYzine HCL (ATARAX) 10 mg tablet   No No   Sig: Take 1 tablet (10 mg total) by mouth daily at bedtime   Patient not taking: Reported on 10/1/2019   naproxen (NAPROSYN) 500 mg tablet   No No   Sig: Take 1 tablet (500 mg total) by mouth 2 (two) times a day with meals for 7 days      Facility-Administered Medications Last Administration Doses Remaining   aspirin chewable tablet 324 mg 10/1/2019 10:00 AM           Past Medical History:   Diagnosis Date    Sepsis [facility])        Past Surgical History:   Procedure Laterality Date    CYST REMOVAL      from tailbone    NO PAST SURGERIES         Family History   Problem Relation Age of Onset    Hypertension Father     Heart disease Father      I have reviewed and agree with the history as documented  Social History     Tobacco Use    Smoking status: Current Every Day Smoker     Packs/day: 1 00     Types: Cigarettes    Smokeless tobacco: Never Used   Substance Use Topics    Alcohol use: Yes     Comment: socially    Drug use: No        Review of Systems   Constitutional: Negative  Negative for activity change, appetite change, chills, diaphoresis, fatigue, fever and unexpected weight change  HENT: Negative  Negative for sore throat, trouble swallowing and voice change  Eyes: Negative  Respiratory: Positive for shortness of breath  Negative for cough, chest tightness and wheezing  Cardiovascular: Positive for chest pain  Negative for palpitations and leg swelling  Gastrointestinal: Positive for nausea and vomiting  Negative for abdominal pain and blood in stool  Endocrine: Negative  Genitourinary: Negative  Negative for flank pain and hematuria  Musculoskeletal: Positive for neck pain  Negative for arthralgias, back pain, gait problem, joint swelling, myalgias and neck stiffness  Skin: Negative  Negative for rash and wound  Allergic/Immunologic: Negative  Neurological: Negative  Negative for dizziness, seizures, syncope, weakness, light-headedness and headaches  Hematological: Negative  Psychiatric/Behavioral: Negative  All other systems reviewed and are negative        Physical Exam  Physical Exam   Constitutional: He is oriented to person, place, and time  Vital signs are normal  He appears well-developed and well-nourished  He does not have a sickly appearance  No distress  Slightly pale appearance   HENT:   Right Ear: External ear normal  No swelling  Tympanic membrane is not bulging  Left Ear: External ear normal  No swelling  Tympanic membrane is not bulging  Nose: Nose normal    Mouth/Throat: Oropharynx is clear and moist  No oropharyngeal exudate  Eyes: Pupils are equal, round, and reactive to light  Conjunctivae, EOM and lids are normal    Neck: Normal range of motion  Neck supple  No JVD present  No tracheal deviation, no edema and normal range of motion present  No thyromegaly present  Cardiovascular: Normal rate, regular rhythm, normal heart sounds, intact distal pulses and normal pulses  Exam reveals no gallop and no friction rub  No murmur heard  Pulmonary/Chest: Effort normal and breath sounds normal  No accessory muscle usage or stridor  No tachypnea  No respiratory distress  He has no decreased breath sounds  He has no wheezes  He has no rhonchi  He has no rales  He exhibits no tenderness  Abdominal: Soft  Bowel sounds are normal  He exhibits no distension and no mass  There is no tenderness  There is no rebound, no guarding and negative Ahn's sign  No hernia  Musculoskeletal: Normal range of motion  He exhibits no edema or tenderness  Right lower leg: Normal  He exhibits no tenderness and no edema  Left lower leg: Normal  He exhibits no tenderness and no edema  Lymphadenopathy:     He has no cervical adenopathy  Neurological: He is alert and oriented to person, place, and time  He has normal strength and normal reflexes  No cranial nerve deficit or sensory deficit  GCS eye subscore is 4  GCS verbal subscore is 5  GCS motor subscore is 6  Skin: Skin is warm and dry  Capillary refill takes less than 2 seconds  No rash noted  He is not diaphoretic  No erythema  No pallor     Psychiatric: He has a normal mood and affect  His speech is normal and behavior is normal    Vitals reviewed  Vital Signs  ED Triage Vitals   Temperature Pulse Respirations Blood Pressure SpO2   10/01/19 1027 10/01/19 1027 10/01/19 1027 10/01/19 1027 10/01/19 1027   98 6 °F (37 °C) 63 16 139/71 100 %      Temp Source Heart Rate Source Patient Position - Orthostatic VS BP Location FiO2 (%)   10/01/19 1027 10/01/19 1027 10/01/19 1027 10/01/19 1027 --   Temporal Monitor Lying Left arm       Pain Score       10/01/19 1103       3           Vitals:    10/01/19 1130 10/01/19 1200 10/01/19 1300 10/01/19 1330   BP: 149/75 109/60 104/56 135/79   Pulse: 67 56 67 58   Patient Position - Orthostatic VS: Lying Lying  Lying         Visual Acuity      ED Medications  Medications   sodium chloride 0 9 % bolus 1,000 mL (0 mL Intravenous Stopped 10/1/19 1215)       Diagnostic Studies  Results Reviewed     Procedure Component Value Units Date/Time    Troponin I [[id number]]  (Normal) Collected:  10/01/19 1402    Lab Status:  Final result Specimen:  Blood from Arm, Right Updated:  10/01/19 1431     Troponin I <0 02 ng/mL     Rapid drug screen, urine [[id number]]  (Normal) Collected:  10/01/19 1116    Lab Status:  Final result Specimen:  Urine, Clean Catch Updated:  10/01/19 1141     Amph/Meth UR Negative     Barbiturate Ur Negative     Benzodiazepine Urine Negative     Cocaine Urine Negative     Methadone Urine Negative     Opiate Urine Negative     PCP Ur Negative     THC Urine Negative    Narrative:       FOR MEDICAL PURPOSES ONLY  IF CONFIRMATION NEEDED PLEASE CONTACT THE LAB WITHIN 5 DAYS  Drug Screen Cutoff Levels:  AMPHETAMINE/METHAMPHETAMINES  1000 ng/mL  BARBITURATES     200 ng/mL  BENZODIAZEPINES     200 ng/mL  COCAINE      300 ng/mL  METHADONE      300 ng/mL  OPIATES      300 ng/mL  PHENCYCLIDINE     25 ng/mL  THC       50 ng/mL      Blood culture #1 [[id number]] Collected:  10/01/19 1138    Lab Status:   In process Specimen:  Blood from Arm, Right Updated:  10/01/19 1140    Blood culture #2 [[id number]] Collected:  10/01/19 1137    Lab Status: In process Specimen:  Blood from Arm, Right Updated:  10/01/19 1139    UA w Reflex to Microscopic [[id number]] Collected:  10/01/19 1116    Lab Status:  Final result Specimen:  Urine, Clean Catch Updated:  10/01/19 1125     Color, UA Yellow     Clarity, UA Clear     Specific Gravity, UA 1 010     pH, UA 7 0     Leukocytes, UA Negative     Nitrite, UA Negative     Protein, UA Negative mg/dl      Glucose, UA Negative mg/dl      Ketones, UA Negative mg/dl      Urobilinogen, UA 0 2 E U /dl      Bilirubin, UA Negative     Blood, UA Negative    TSH [[id number]]  (Normal) Collected:  10/01/19 1044    Lab Status:  Final result Specimen:  Blood from Arm, Left Updated:  10/01/19 1116     TSH 3RD GENERATON 1 728 uIU/mL     Narrative:       Patients undergoing fluorescein dye angiography may retain small amounts of fluorescein in the body for 48-72 hours post procedure  Samples containing fluorescein can produce falsely depressed TSH values  If the patient had this procedure,a specimen should be resubmitted post fluorescein clearance  Magnesium [[id number]]  (Normal) Collected:  10/01/19 1044    Lab Status:  Final result Specimen:  Blood from Arm, Left Updated:  10/01/19 1116     Magnesium 2 0 mg/dL     Lipase [[id number]]  (Abnormal) Collected:  10/01/19 1044    Lab Status:  Final result Specimen:  Blood from Arm, Left Updated:  10/01/19 1116     Lipase 46 u/L     Lactic acid, plasma [[id number]]  (Normal) Collected:  10/01/19 1044    Lab Status:  Final result Specimen:  Blood from Arm, Left Updated:  10/01/19 1112     LACTIC ACID 2 0 mmol/L     Narrative:       Result may be elevated if tourniquet was used during collection      Comprehensive metabolic panel [[id number]] Collected:  10/01/19 1044    Lab Status:  Final result Specimen:  Blood from Arm, Left Updated:  10/01/19 1110     Sodium 140 mmol/L      Potassium 4 4 mmol/L      Chloride 103 mmol/L      CO2 28 mmol/L      ANION GAP 9 mmol/L      BUN 8 mg/dL      Creatinine 0 97 mg/dL      Glucose 99 mg/dL      Calcium 8 7 mg/dL      AST 20 U/L      ALT 14 U/L      Alkaline Phosphatase 70 U/L      Total Protein 7 4 g/dL      Albumin 3 9 g/dL      Total Bilirubin 0 40 mg/dL      eGFR 99 ml/min/1 73sq m     Narrative:       [place] guidelines for Chronic Kidney Disease (CKD):     Stage 1 with normal or high GFR (GFR > 90 mL/min/1 73 square meters)    Stage 2 Mild CKD (GFR = 60-89 mL/min/1 73 square meters)    Stage 3A Moderate CKD (GFR = 45-59 mL/min/1 73 square meters)    Stage 3B Moderate CKD (GFR = 30-44 mL/min/1 73 square meters)    Stage 4 Severe CKD (GFR = 15-29 mL/min/1 73 square meters)    Stage 5 End Stage CKD (GFR <15 mL/min/1 73 square meters)  Note: GFR calculation is accurate only with a steady state creatinine    Troponin I [[id number]]  (Normal) Collected:  10/01/19 1044    Lab Status:  Final result Specimen:  Blood from Arm, Left Updated:  10/01/19 1110     Troponin I <0 02 ng/mL     D-Dimer [[id number]]  (Normal) Collected:  10/01/19 1044    Lab Status:  Final result Specimen:  Blood from Arm, Left Updated:  10/01/19 1103     D-Dimer, Quant <270 ng/ml (FEU)     Protime-INR [[id number]]  (Normal) Collected:  10/01/19 1044    Lab Status:  Final result Specimen:  Blood from Arm, Left Updated:  10/01/19 1059     Protime 13 3 seconds      INR 1 01    APTT [[id number]]  (Normal) Collected:  10/01/19 1044    Lab Status:  Final result Specimen:  Blood from Arm, Left Updated:  10/01/19 1059     PTT 33 seconds     CBC and differential [[id number]] Collected:  10/01/19 1044    Lab Status:  Final result Specimen:  Blood from Arm, Left Updated:  10/01/19 1051     WBC 7 36 Thousand/uL      RBC 4 80 Million/uL      Hemoglobin 15 0 g/dL      Hematocrit 44 3 %      MCV 92 fL      MCH 31 3 pg      MCHC 33 9 g/dL      RDW 11 9 %      MPV 10 7 fL Platelets 508 Thousands/uL      nRBC 0 /100 WBCs      Neutrophils Relative 56 %      Immat GRANS % 0 %      Lymphocytes Relative 33 %      Monocytes Relative 9 %      Eosinophils Relative 2 %      Basophils Relative 0 %      Neutrophils Absolute 4 07 Thousands/µL      Immature Grans Absolute 0 03 Thousand/uL      Lymphocytes Absolute 2 44 Thousands/µL      Monocytes Absolute 0 68 Thousand/µL      Eosinophils Absolute 0 11 Thousand/µL      Basophils Absolute 0 03 Thousands/µL                  XR chest 2 views   ED Interpretation by Tawana Vergara PA-C (10/01 1235)   No evidence of acute cardiopulmonary process  Final Result by Radames Carbone MD (10/01 1253)      No acute cardiopulmonary disease              Workstation performed: [id number]                    Procedures  ECG 12 Lead Documentation Only  Date/Time: 10/1/2019 10:53 AM  Performed by: Tawana Vergara PA-C  Authorized by: Tawana Vergara PA-C     Indications / Diagnosis:  Chest pain  ECG reviewed by me, the ED Provider: yes    Patient location:  ED  Previous ECG:     Comparison to cardiac monitor: Yes    Interpretation:     Interpretation: normal    Rate:     ECG rate:  65    ECG rate assessment: normal    Rhythm:     Rhythm: sinus rhythm    Ectopy:     Ectopy: PAC    QRS:     QRS axis:  Normal    QRS intervals:  Normal  Conduction:     Conduction: normal    ST segments:     ST segments:  Normal  T waves:     T waves: normal             ED Course  ED Course as of Oct 01 1454   Tue Oct 01, 2019   1039 Blood Pressure: 139/71   1039 Temperature: 98 6 °F (37 °C)   1039 Pulse: 63   1039 Respirations: 16   1039 SpO2: 100 %   1054 WBC: 7 36   1054 Hemoglobin: 15 0   1054 Platelet Count: 986   1144 THC URINE: Negative   1144 PHENCYCLIDINE URINE: Negative   1144 PHENCYCLIDINE URINE: Negative   1144 OPIATE URINE: Negative   1144 OPIATE URINE: Negative   1144 AMPH/METH: Negative   1144 Color, UA: Yellow   1144 SL AMB SPECIFIC GRAVITY_URINE: 1 010   1144 pH, UA: 7 0   1144 Glucose, UA: Negative   1144 Bilirubin, UA: Negative   1144 Lipase(!): 46   1144 Magnesium: 2 0   1144 LACTIC ACID: 2 0   1144 Troponin I: <0 02   1144 D-DIMER QUANTITATIVE: <270   1145 D-DIMER QUANTITATIVE: <270   1145 INR: 1 01   1145 Sodium: 140   1145 Calcium: 8 7   1145 Calcium: 8 7   1427 Trop in process      1453 Troponin I: <0 02   1453 Repeat trip negative  HEART Risk Score      Most Recent Value   History  0 Filed at: 10/01/2019 1054   ECG  0 Filed at: 10/01/2019 1054   Age  0 Filed at: 10/01/2019 1054   Risk Factors  1 Filed at: 10/01/2019 1054   Troponin  0 Filed at: 10/01/2019 1054   Heart Score Risk Calculator   History  0 Filed at: 10/01/2019 1054   ECG  0 Filed at: 10/01/2019 1054   Age  0 Filed at: 10/01/2019 1054   Risk Factors  1 Filed at: 10/01/2019 1054   Troponin  0 Filed at: 10/01/2019 1054   HEART Score  1 Filed at: 10/01/2019 1054   HEART Score  1 Filed at: 10/01/2019 1054                            MDM    Disposition  Final diagnoses:   Chest pain     Time reflects when diagnosis was documented in both MDM as applicable and the Disposition within this note     Time User Action Codes Description Comment    10/1/2019  2:53 PM Taemla Tobias [R07 9] Chest pain       ED Disposition     ED Disposition Condition Date/Time Comment    Discharge Stable Tue Oct 1, 2019  2:53 PM Lindalou Kussmaul discharge to home/self care  Follow-up Information     Follow up With Specialties Details Why [address] L[address]EN Warren Physician Assistant Schedule an appointment as soon as possible for a visit   [address]  [phone]            Patient's Medications   Discharge Prescriptions    No medications on file     No discharge procedures on file      ED Provider  Electronically Signed by           Tawana Vergara PA-C  10/01/19 6531

## 2019-10-01 NOTE — PROGRESS NOTES
[address] [facility]  ([facility]) [phone]  (fax) [phone]        NAME: Tu Maurer is a 42 y o  male  : 1980    MRN: [id number]  DATE: 2019  TIME: 9:58 AM    Assessment and Plan   Chest pain, unspecified type [R07 9]  1  Chest pain, unspecified type  aspirin chewable tablet 324 mg    Transfer to other facility    POCT ECG    CANCELED: POCT ECG       Patient Instructions   EKG showing some PVCs and possible ST elevation leads V3 and 4  Proceed to [facility] [address] via ambulance  I did call and personally speak to Dr Jessika Harris to update him on patient's status  Patient given 4 chewable aspirin at clinic today  Proceed to ER  Chief Complaint     Chief Complaint   Patient presents with    Chest Pain     with keft arm and keft hand numbness, shortnes of breath x 1 month nausea and vomting          History of Present Illness   Tu Maurer presents to the clinic c/o    Chest Pain    This is a new problem  The current episode started 1 to 4 weeks ago  The onset quality is gradual  The problem occurs intermittently  The problem has been gradually worsening (worse the last 2 days)  The pain is present in the substernal region  The pain is moderate  The quality of the pain is described as heavy and crushing  The pain radiates to the left shoulder, left neck and left arm (pain and numbness in left arm)  Associated symptoms include diaphoresis, dizziness, exertional chest pressure, irregular heartbeat, malaise/fatigue, nausea, numbness, palpitations, shortness of breath, vomiting and weakness  Pertinent negatives include no abdominal pain, back pain, claudication, fever, headaches, hemoptysis, leg pain or lower extremity edema  He has tried nothing for the symptoms  Risk factors include male gender and smoking/tobacco exposure  His family medical history is significant for early MI         Review of Systems   Review of Systems Constitutional: Positive for diaphoresis, fatigue and malaise/fatigue  Negative for fever  Eyes: Negative for visual disturbance  Respiratory: Positive for shortness of breath  Negative for hemoptysis  Cardiovascular: Positive for chest pain and palpitations  Negative for claudication and leg swelling  Gastrointestinal: Positive for nausea and vomiting  Negative for abdominal pain  Musculoskeletal: Negative for back pain  Neurological: Positive for dizziness, weakness and numbness  Negative for headaches  Hematological: Negative for adenopathy           Current Medications     Long-Term Medications   Medication Sig Dispense Refill    FLUoxetine (PROzac) 20 mg capsule Take 1 capsule (20 mg total) by mouth daily (Patient not taking: Reported on 10/1/2019) 30 capsule 1    hydrOXYzine HCL (ATARAX) 10 mg tablet Take 1 tablet (10 mg total) by mouth daily at bedtime (Patient not taking: Reported on 10/1/2019) 30 tablet 1    naproxen (NAPROSYN) 500 mg tablet Take 1 tablet (500 mg total) by mouth 2 (two) times a day with meals for 7 days 14 tablet 0       Current Allergies     Allergies as of 10/01/2019 - Reviewed 10/01/2019   Allergen Reaction Noted    Bee venom  09/04/2018            The following portions of the patient's history were reviewed and updated as appropriate: allergies, current medications, past family history, past medical history, past social history, past surgical history and problem list   Past Medical History:   Diagnosis Date    Sepsis ([address] )      Past Surgical History:   Procedure Laterality Date    [facility]    NO PAST SURGERIES       Social History     Socioeconomic History    Marital status: Single     Spouse name: Not on file    Number of children: Not on file    Years of education: Not on file    Highest education level: Not on file   Occupational History    Not on file   Social Needs    Financial resource strain: Not on file    Food insecurity: Worry: Not on file     Inability: Not on file    Transportation needs:     Medical: Not on file     Non-medical: Not on file   Tobacco Use    Smoking status: Current Every Day Smoker     Packs/day: 1 00     Types: Cigarettes    Smokeless tobacco: Never Used   Substance and Sexual Activity    Alcohol use: Yes     Comment: socially    Drug use: No    Sexual activity: Not on file   Lifestyle    Physical activity:     Days per week: Not on file     Minutes per session: Not on file    Stress: Not on file   Relationships    Social connections:     Talks on phone: Not on file     Gets together: Not on file     Attends Hindu service: Not on file     Active member of club or organization: Not on file     Attends meetings of clubs or organizations: Not on file     Relationship status: Not on file    Intimate partner violence:     Fear of current or ex partner: Not on file     Emotionally abused: Not on file     Physically abused: Not on file     Forced sexual activity: Not on file   Other Topics Concern    Not on file   Social History Narrative    Not on file       Objective   /71   Pulse 66   Temp 97 7 °F (36 5 °C)   Resp 20   Ht 6' 2" (1 88 m)   Wt 86 2 kg (190 lb)   SpO2 97%   BMI 24 39 kg/m²      Physical Exam     Physical Exam   Constitutional: He appears well-developed  He appears ill  HENT:   Head: Normocephalic  Neck: No JVD present  Cardiovascular: Normal rate, regular rhythm, normal heart sounds and normal pulses  No murmur heard  Pulmonary/Chest: Effort normal and breath sounds normal  No accessory muscle usage or stridor  No tachypnea  No respiratory distress  He has no decreased breath sounds  He has no wheezes  He has no rhonchi  He has no rales  He exhibits no tenderness  Abdominal: Soft  Normal appearance and bowel sounds are normal  There is no tenderness  There is no rigidity, no rebound, no guarding and no CVA tenderness  Neurological: He is alert     Skin: Skin is warm  No rash noted  Psychiatric: He has a normal mood and affect  Nursing note and vitals reviewed        Sharath Wen PA-C

## 2019-10-02 LAB
ATRIAL RATE: 65 BPM
P AXIS: 44 DEGREES
PR INTERVAL: 146 MS
QRS AXIS: 68 DEGREES
QRSD INTERVAL: 94 MS
QT INTERVAL: 416 MS
QTC INTERVAL: 432 MS
T WAVE AXIS: 71 DEGREES
VENTRICULAR RATE: 65 BPM

## 2019-10-02 PROCEDURE — 93010 ELECTROCARDIOGRAM REPORT: CPT | Performed by: INTERNAL MEDICINE

## 2019-10-06 LAB
BACTERIA BLD CULT: NORMAL
BACTERIA BLD CULT: NORMAL

## 2019-10-07 ENCOUNTER — OFFICE VISIT (OUTPATIENT)
Dept: FAMILY MEDICINE CLINIC | Facility: CLINIC | Age: 39
End: 2019-10-07
Payer: COMMERCIAL

## 2019-10-07 VITALS
HEIGHT: 74 IN | DIASTOLIC BLOOD PRESSURE: 78 MMHG | RESPIRATION RATE: 18 BRPM | BODY MASS INDEX: 24.38 KG/M2 | OXYGEN SATURATION: 97 % | WEIGHT: 190 LBS | SYSTOLIC BLOOD PRESSURE: 118 MMHG | HEART RATE: 81 BPM | TEMPERATURE: 97.2 F

## 2019-10-07 DIAGNOSIS — R21 SKIN RASH: Primary | ICD-10-CM

## 2019-10-07 DIAGNOSIS — R07.9 CHEST PAIN, UNSPECIFIED TYPE: ICD-10-CM

## 2019-10-07 PROCEDURE — T1015 CLINIC SERVICE: HCPCS | Performed by: FAMILY MEDICINE

## 2019-10-07 RX ORDER — CLOTRIMAZOLE 1 %
CREAM (GRAM) TOPICAL 2 TIMES DAILY
Qty: 30 G | Refills: 0 | Status: SHIPPED | OUTPATIENT
Start: 2019-10-07 | End: 2022-08-02

## 2019-10-07 NOTE — PROGRESS NOTES
Assessment/Plan:     Diagnoses and all orders for this visit:    Skin rash  -     clotrimazole (LOTRIMIN) 1 % cream; Apply topically 2 (two) times a day  -     Ambulatory referral to Podiatry; Future    Chest pain, unspecified type  -     Echo stress test w contrast if indicated; Future        Start Lotrisone creme BID as directed  Stress echo as ordered  He is not interested in anything for depression at this time  RTC 1 month          Subjective:        Patient ID: Hakeem Ferrari is a 42 y o  male  Chief Complaint   Patient presents with    Recurrent Skin Infections     Patient states his left foot has been infected for the past month    Chest Pain     patient states she has chest pain when breathing at times  He said he went to ER for this       43 yo male presents for L foot infection x 1 month  Denies injury  Reports it started with 'bubbles" in the area but no david discharge  It is tender  He has been using denatured alcohol with out relief  He was also seen in ER last week for chest pain and released  He continues to have intermittent discomfort  He does admit he is worried as his father passed from heart disease at age 39 years  He brakes into a sweat after  Smoking  Chest discomfort comes and goes  Sometimes SOB with it and sometimes SOB for no reason  He admits to feeling depressed        The following portions of the patient's history were reviewed and updated as appropriate: allergies, current medications, past family history, past medical history, past social history, past surgical history and problem list     Patient Active Problem List   Diagnosis    Pharyngitis    Fever    Pilonidal cyst without abscess    Cyst of skin and subcutaneous tissue       Current Outpatient Medications   Medication Sig Dispense Refill    clotrimazole (LOTRIMIN) 1 % cream Apply topically 2 (two) times a day 30 g 0     Current Facility-Administered Medications   Medication Dose Route Frequency Provider Last Rate Last Dose    aspirin chewable tablet 324 mg  324 mg Oral Daily Dex Thorne PA-C   324 mg at 10/01/19 1000        Past Medical History:   Diagnosis Date    Sepsis [facility])         Past Surgical History:   Procedure Laterality Date    CYST REMOVAL      from tailbone    NO PAST SURGERIES          Social History     Socioeconomic History    Marital status: Single     Spouse name: Not on file    Number of children: Not on file    Years of education: Not on file    Highest education level: Not on file   Occupational History    Not on file   Social Needs    Financial resource strain: Not on file    Food insecurity:     Worry: Not on file     Inability: Not on file    Transportation needs:     Medical: Not on file     Non-medical: Not on file   Tobacco Use    Smoking status: Current Every Day Smoker     Packs/day: 1 00     Types: Cigarettes    Smokeless tobacco: Never Used   Substance and Sexual Activity    Alcohol use: Yes     Comment: socially    Drug use: No    Sexual activity: Not on file   Lifestyle    Physical activity:     Days per week: Not on file     Minutes per session: Not on file    Stress: Not on file   Relationships    Social connections:     Talks on phone: Not on file     Gets together: Not on file     Attends Confucianist service: Not on file     Active member of club or organization: Not on file     Attends meetings of clubs or organizations: Not on file     Relationship status: Not on file    Intimate partner violence:     Fear of current or ex partner: Not on file     Emotionally abused: Not on file     Physically abused: Not on file     Forced sexual activity: Not on file   Other Topics Concern    Not on file   Social History Narrative    Not on file        Review of Systems   Constitutional: Negative  HENT: Negative  Eyes: Negative  Respiratory: Negative  Cardiovascular:        As per HPI  Gastrointestinal: Negative  Endocrine: Negative  Genitourinary: Negative  Musculoskeletal: Negative  Skin: Positive for rash  Allergic/Immunologic: Negative  Neurological: Positive for dizziness  Hematological: Negative  Psychiatric/Behavioral:        Depressed         Objective:      /78   Pulse 81   Temp (!) 97 2 °F (36 2 °C)   Resp 18   Ht 6' 2" (1 88 m)   Wt 86 2 kg (190 lb)   SpO2 97%   BMI 24 39 kg/m²          Physical Exam   Constitutional: He is oriented to person, place, and time  He appears well-developed and well-nourished  HENT:   Head: Normocephalic and atraumatic  Right Ear: External ear normal    Left Ear: External ear normal    Eyes: Pupils are equal, round, and reactive to light  Neck: Normal range of motion  Neck supple  No thyromegaly present  Cardiovascular: Normal rate, regular rhythm and normal heart sounds  No murmur heard  Pulmonary/Chest: Effort normal and breath sounds normal  He has no wheezes  He has no rales  Musculoskeletal: He exhibits no edema  Lymphadenopathy:     He has no cervical adenopathy  Neurological: He is alert and oriented to person, place, and time  No cranial nerve deficit  Skin:   L foot rash noted  Red scaly area 1x1 cm  Psychiatric: He has a normal mood and affect  Nursing note and vitals reviewed

## 2019-10-15 ENCOUNTER — HOSPITAL ENCOUNTER (OUTPATIENT)
Dept: NON INVASIVE DIAGNOSTICS | Facility: HOSPITAL | Age: 39
Discharge: HOME | End: 2019-10-15
Payer: COMMERCIAL

## 2019-10-15 DIAGNOSIS — R07.9 CHEST PAIN, UNSPECIFIED TYPE: ICD-10-CM

## 2019-10-15 PROCEDURE — 93350 STRESS TTE ONLY: CPT

## 2019-10-15 PROCEDURE — 93351 STRESS TTE COMPLETE: CPT | Performed by: INTERNAL MEDICINE

## 2019-10-17 LAB
CHEST PAIN STATEMENT: NORMAL
MAX DIASTOLIC BP: 80 MMHG
MAX HEART RATE: 160 BPM
MAX PREDICTED HEART RATE: 183 BPM
MAX. SYSTOLIC BP: 148 MMHG
PROTOCOL NAME: NORMAL
TARGET HR FORMULA: NORMAL
TEST INDICATION: NORMAL
TIME IN EXERCISE PHASE: NORMAL

## 2019-10-25 ENCOUNTER — OFFICE VISIT (OUTPATIENT)
Dept: URGENT CARE | Facility: CLINIC | Age: 39
End: 2019-10-25
Payer: COMMERCIAL

## 2019-10-25 VITALS
HEIGHT: 74 IN | SYSTOLIC BLOOD PRESSURE: 100 MMHG | TEMPERATURE: 98 F | HEART RATE: 66 BPM | WEIGHT: 190 LBS | BODY MASS INDEX: 24.38 KG/M2 | DIASTOLIC BLOOD PRESSURE: 64 MMHG | RESPIRATION RATE: 18 BRPM | OXYGEN SATURATION: 99 %

## 2019-10-25 DIAGNOSIS — J01.00 ACUTE NON-RECURRENT MAXILLARY SINUSITIS: Primary | ICD-10-CM

## 2019-10-25 PROCEDURE — S9088 SERVICES PROVIDED IN URGENT: HCPCS | Performed by: EMERGENCY MEDICINE

## 2019-10-25 PROCEDURE — 99213 OFFICE O/P EST LOW 20 MIN: CPT | Performed by: EMERGENCY MEDICINE

## 2019-10-25 RX ORDER — AZITHROMYCIN 250 MG/1
TABLET, FILM COATED ORAL
Qty: 6 TABLET | Refills: 0 | Status: SHIPPED | OUTPATIENT
Start: 2019-10-25 | End: 2019-10-29

## 2019-10-25 NOTE — PATIENT INSTRUCTIONS
1    your prescription today  2  Humidify air in bedroom  3  Claritin 12 hour for nasal drainage  4  If worsening symptoms, see your family doctor    Sinusitis   WHAT YOU NEED TO KNOW:   Sinusitis is inflammation or infection of your sinuses  It is most often caused by a virus  Acute sinusitis may last up to 12 weeks  Chronic sinusitis lasts longer than 12 weeks  Recurrent sinusitis means you have 4 or more times in 1 year  DISCHARGE INSTRUCTIONS:   Return to the emergency department if:   · Your eye and eyelid are red, swollen, and painful  · You cannot open your eye  · You have vision changes, such as double vision  · Your eyeball bulges out or you cannot move your eye  · You are more sleepy than normal, or you notice changes in your ability to think, move, or talk  · You have a stiff neck, a fever, or a bad headache  · You have swelling of your forehead or scalp  Contact your healthcare provider if:   · Your symptoms do not improve after 3 days  · Your symptoms do not go away after 10 days  · You have nausea and are vomiting  · Your nose is bleeding  · You have questions or concerns about your condition or care  Medicines: Your symptoms may go away on their own  Your healthcare provider may recommend watchful waiting for up to 10 days before starting antibiotics  You may  need any of the following:  · Acetaminophen  decreases pain and fever  It is available without a doctor's order  Ask how much to take and how often to take it  Follow directions  Read the labels of all other medicines you are using to see if they also contain acetaminophen, or ask your doctor or pharmacist  Acetaminophen can cause liver damage if not taken correctly  Do not use more than 4 grams (4,000 milligrams) total of acetaminophen in one day  · NSAIDs , such as ibuprofen, help decrease swelling, pain, and fever  This medicine is available with or without a doctor's order   NSAIDs can cause stomach bleeding or kidney problems in certain people  If you take blood thinner medicine, always ask your healthcare provider if NSAIDs are safe for you  Always read the medicine label and follow directions  · Nasal steroid sprays  may help decrease inflammation in your nose and sinuses  · Decongestants  help reduce swelling and drain mucus in the nose and sinuses  They may help you breathe easier  · Antihistamines  help dry mucus in the nose and relieve sneezing  · Antibiotics  help treat or prevent a bacterial infection  · Take your medicine as directed  Contact your healthcare provider if you think your medicine is not helping or if you have side effects  Tell him or her if you are allergic to any medicine  Keep a list of the medicines, vitamins, and herbs you take  Include the amounts, and when and why you take them  Bring the list or the pill bottles to follow-up visits  Carry your medicine list with you in case of an emergency  Self-care:   · Rinse your sinuses  Use a sinus rinse device to rinse your nasal passages with a saline (salt water) solution or distilled water  Do not use tap water  This will help thin the mucus in your nose and rinse away pollen and dirt  It will also help reduce swelling so you can breathe normally  Ask your healthcare provider how often to do this  · Breathe in steam   Heat a bowl of water until you see steam  Lean over the bowl and make a tent over your head with a large towel  Breathe deeply for about 20 minutes  Be careful not to get too close to the steam or burn yourself  Do this 3 times a day  You can also breathe deeply when you take a hot shower  · Sleep with your head elevated  Place an extra pillow under your head before you go to sleep to help your sinuses drain  · Drink liquids as directed  Ask your healthcare provider how much liquid to drink each day and which liquids are best for you   Liquids will thin the mucus in your nose and help it drain  Avoid drinks that contain alcohol or caffeine  · Do not smoke, and avoid secondhand smoke  Nicotine and other chemicals in cigarettes and cigars can make your symptoms worse  Ask your healthcare provider for information if you currently smoke and need help to quit  E-cigarettes or smokeless tobacco still contain nicotine  Talk to your healthcare provider before you use these products  Prevent the spread of germs that cause sinusitis:  Wash your hands often with soap and water  Wash your hands after you use the bathroom, change a child's diaper, or sneeze  Wash your hands before you prepare or eat food  Follow up with your healthcare provider as directed: You may be referred to an ear, nose, and throat specialist  Write down your questions so you remember to ask them during your visits  © 2017 [facility] Information is for End User's use only and may not be sold, redistributed or otherwise used for commercial purposes  All illustrations and images included in CareNotes® are the copyrighted property of [employer] M [employer] Inc  or [employer] Felipe  The above information is an  only  It is not intended as medical advice for individual conditions or treatments  Talk to your doctor, nurse or pharmacist before following any medical regimen to see if it is safe and effective for you

## 2019-10-25 NOTE — PROGRESS NOTES
[facility]        NAME: Tk Syed is a 42 y o  male  : 1980    MRN: [id number]  DATE: 2019  TIME: 4:57 PM    Assessment and Plan   Acute non-recurrent maxillary sinusitis [J01 00]  1  Acute non-recurrent maxillary sinusitis  azithromycin (ZITHROMAX) 250 mg tablet         Patient Instructions     Patient Instructions       1   your prescription today  2  Humidify air in bedroom  3  Claritin 12 hour for nasal drainage  4  If worsening symptoms, see your family doctor    Sinusitis   WHAT YOU NEED TO KNOW:   Sinusitis is inflammation or infection of your sinuses  It is most often caused by a virus  Acute sinusitis may last up to 12 weeks  Chronic sinusitis lasts longer than 12 weeks  Recurrent sinusitis means you have 4 or more times in 1 year  DISCHARGE INSTRUCTIONS:   Return to the emergency department if:   · Your eye and eyelid are red, swollen, and painful  · You cannot open your eye  · You have vision changes, such as double vision  · Your eyeball bulges out or you cannot move your eye  · You are more sleepy than normal, or you notice changes in your ability to think, move, or talk  · You have a stiff neck, a fever, or a bad headache  · You have swelling of your forehead or scalp  Contact your healthcare provider if:   · Your symptoms do not improve after 3 days  · Your symptoms do not go away after 10 days  · You have nausea and are vomiting  · Your nose is bleeding  · You have questions or concerns about your condition or care  Medicines: Your symptoms may go away on their own  Your healthcare provider may recommend watchful waiting for up to 10 days before starting antibiotics  You may  need any of the following:  · Acetaminophen  decreases pain and fever  It is available without a doctor's order  Ask how much to take and how often to take it  Follow directions   Read the labels of all other medicines you are using to see if they also contain acetaminophen, or ask your doctor or pharmacist  Acetaminophen can cause liver damage if not taken correctly  Do not use more than 4 grams (4,000 milligrams) total of acetaminophen in one day  · NSAIDs , such as ibuprofen, help decrease swelling, pain, and fever  This medicine is available with or without a doctor's order  NSAIDs can cause stomach bleeding or kidney problems in certain people  If you take blood thinner medicine, always ask your healthcare provider if NSAIDs are safe for you  Always read the medicine label and follow directions  · Nasal steroid sprays  may help decrease inflammation in your nose and sinuses  · Decongestants  help reduce swelling and drain mucus in the nose and sinuses  They may help you breathe easier  · Antihistamines  help dry mucus in the nose and relieve sneezing  · Antibiotics  help treat or prevent a bacterial infection  · Take your medicine as directed  Contact your healthcare provider if you think your medicine is not helping or if you have side effects  Tell him or her if you are allergic to any medicine  Keep a list of the medicines, vitamins, and herbs you take  Include the amounts, and when and why you take them  Bring the list or the pill bottles to follow-up visits  Carry your medicine list with you in case of an emergency  Self-care:   · Rinse your sinuses  Use a sinus rinse device to rinse your nasal passages with a saline (salt water) solution or distilled water  Do not use tap water  This will help thin the mucus in your nose and rinse away pollen and dirt  It will also help reduce swelling so you can breathe normally  Ask your healthcare provider how often to do this  · Breathe in steam   Heat a bowl of water until you see steam  Lean over the bowl and make a tent over your head with a large towel  Breathe deeply for about 20 minutes  Be careful not to get too close to the steam or burn yourself   Do this 3 times a day  You can also breathe deeply when you take a hot shower  · Sleep with your head elevated  Place an extra pillow under your head before you go to sleep to help your sinuses drain  · Drink liquids as directed  Ask your healthcare provider how much liquid to drink each day and which liquids are best for you  Liquids will thin the mucus in your nose and help it drain  Avoid drinks that contain alcohol or caffeine  · Do not smoke, and avoid secondhand smoke  Nicotine and other chemicals in cigarettes and cigars can make your symptoms worse  Ask your healthcare provider for information if you currently smoke and need help to quit  E-cigarettes or smokeless tobacco still contain nicotine  Talk to your healthcare provider before you use these products  Prevent the spread of germs that cause sinusitis:  Wash your hands often with soap and water  Wash your hands after you use the bathroom, change a child's diaper, or sneeze  Wash your hands before you prepare or eat food  Follow up with your healthcare provider as directed: You may be referred to an ear, nose, and throat specialist  Write down your questions so you remember to ask them during your visits  © 2017 [facility] Information is for End User's use only and may not be sold, redistributed or otherwise used for commercial purposes  All illustrations and images included in CareNotes® are the copyrighted property of A [employer] M [employer] Inc  or [employer] Felipe  The above information is an  only  It is not intended as medical advice for individual conditions or treatments  Talk to your doctor, nurse or pharmacist before following any medical regimen to see if it is safe and effective for you  Follow up with PCP in 3-5 days  Proceed to  ER if symptoms worsen      Chief Complaint     Chief Complaint   Patient presents with    Cough     sinus pressure, cough, blood clots in the nose for 4-5 days         History of Present Illness       This is a 42-year-old male who presents with sinus congestion, head pressure, and blood clots coming from his nose especially on the left side  The patient also complains of left-sided facial pain  He has a cough which is mostly at nighttime with scant mucus and has no fever  All the symptoms began 7 days ago  He feels his symptoms are getting worse  Patient has been using over-the-counter Mucinex and sinus decongestants  He has never had sinus surgery  Years ago he was a cocaine abuser but that was very remote  He has no allergies to any medications      Review of Systems   Review of Systems   Constitutional: Negative  Negative for fever  HENT: Positive for congestion, rhinorrhea, sinus pressure and sinus pain  Negative for sore throat  Eyes: Negative  Negative for discharge and redness  Respiratory: Positive for cough  Negative for shortness of breath  Cardiovascular: Negative  Gastrointestinal: Negative  Negative for diarrhea, nausea and vomiting  Endocrine: Negative  Genitourinary: Negative  Musculoskeletal: Negative  Negative for myalgias  Skin: Negative  Negative for rash  Neurological: Negative  Psychiatric/Behavioral: Negative            Current Medications       Current Outpatient Medications:     azithromycin (ZITHROMAX) 250 mg tablet, Take 2 tablets today then 1 tablet daily x 4 days, Disp: 6 tablet, Rfl: 0    clotrimazole (LOTRIMIN) 1 % cream, Apply topically 2 (two) times a day (Patient not taking: Reported on 10/25/2019), Disp: 30 g, Rfl: 0    Current Facility-Administered Medications:     aspirin chewable tablet 324 mg, 324 mg, Oral, Daily, Marc Haynes PA-C, 324 mg at 10/01/19 1000    Current Allergies     Allergies as of 10/25/2019 - Reviewed 10/25/2019   Allergen Reaction Noted    Bee venom  09/04/2018            The following portions of the patient's history were reviewed and updated as appropriate: allergies, current medications, past family history, past medical history, past social history, past surgical history and problem list      Past Medical History:   Diagnosis Date    Sepsis [facility])        Past Surgical History:   Procedure Laterality Date    CYST REMOVAL      from tailbone    NO PAST SURGERIES         Family History   Problem Relation Age of Onset    Hypertension Father     Heart disease Father          Medications have been verified  Objective   /64   Pulse 66   Temp 98 °F (36 7 °C) (Tympanic)   Resp 18   Ht 6' 2" (1 88 m)   Wt 86 2 kg (190 lb)   SpO2 99%   BMI 24 39 kg/m²        Physical Exam     Physical Exam   Constitutional: He is oriented to person, place, and time  He appears well-developed and well-nourished  HENT:   Head: Normocephalic and atraumatic  Right Ear: External ear normal    Left Ear: External ear normal    Nose: Nose normal    Mouth/Throat: Oropharynx is clear and moist  No oropharyngeal exudate  TMs are clear bilaterally  There is no nasal septum  Patient has a history of cocaine abuse in the remote past    Eyes: Pupils are equal, round, and reactive to light  Conjunctivae and EOM are normal    Neck: Normal range of motion  Neck supple  Cardiovascular: Normal rate, regular rhythm and normal heart sounds  Exam reveals no friction rub  No murmur heard  Pulmonary/Chest: Effort normal and breath sounds normal  He has no wheezes  He has no rales  Abdominal: Soft  He exhibits no mass  There is no tenderness  Musculoskeletal: Normal range of motion  Lymphadenopathy:     He has no cervical adenopathy  Neurological: He is alert and oriented to person, place, and time  Skin: Skin is warm and dry  No rash noted  No erythema  Nursing note and vitals reviewed

## 2020-02-22 ENCOUNTER — HOSPITAL ENCOUNTER (EMERGENCY)
Facility: HOSPITAL | Age: 40
Discharge: HOME | End: 2020-02-22
Attending: EMERGENCY MEDICINE
Payer: COMMERCIAL

## 2020-02-22 VITALS
OXYGEN SATURATION: 95 % | HEIGHT: 74 IN | BODY MASS INDEX: 24.7 KG/M2 | TEMPERATURE: 97.5 F | RESPIRATION RATE: 18 BRPM | DIASTOLIC BLOOD PRESSURE: 78 MMHG | HEART RATE: 88 BPM | SYSTOLIC BLOOD PRESSURE: 146 MMHG | WEIGHT: 192.46 LBS

## 2020-02-22 DIAGNOSIS — L05.01 PILONIDAL CYST WITH ABSCESS: Primary | ICD-10-CM

## 2020-02-22 PROCEDURE — 96374 THER/PROPH/DIAG INJ IV PUSH: CPT

## 2020-02-22 PROCEDURE — 99284 EMERGENCY DEPT VISIT MOD MDM: CPT | Performed by: EMERGENCY MEDICINE

## 2020-02-22 PROCEDURE — 99282 EMERGENCY DEPT VISIT SF MDM: CPT

## 2020-02-22 PROCEDURE — 10080 I&D PILONIDAL CYST SIMPLE: CPT | Performed by: EMERGENCY MEDICINE

## 2020-02-22 RX ORDER — DOXYCYCLINE HYCLATE 100 MG/1
100 CAPSULE ORAL ONCE
Status: COMPLETED | OUTPATIENT
Start: 2020-02-22 | End: 2020-02-22

## 2020-02-22 RX ORDER — LIDOCAINE HYDROCHLORIDE AND EPINEPHRINE 10; 10 MG/ML; UG/ML
10 INJECTION, SOLUTION INFILTRATION; PERINEURAL ONCE
Status: COMPLETED | OUTPATIENT
Start: 2020-02-22 | End: 2020-02-22

## 2020-02-22 RX ORDER — DOXYCYCLINE HYCLATE 100 MG/1
100 CAPSULE ORAL 2 TIMES DAILY
Qty: 14 CAPSULE | Refills: 0 | Status: SHIPPED | OUTPATIENT
Start: 2020-02-22 | End: 2020-02-29

## 2020-02-22 RX ORDER — DOXYCYCLINE HYCLATE 100 MG/1
100 CAPSULE ORAL 2 TIMES DAILY
Qty: 14 CAPSULE | Refills: 0 | Status: SHIPPED | OUTPATIENT
Start: 2020-02-22 | End: 2020-02-22

## 2020-02-22 RX ORDER — KETOROLAC TROMETHAMINE 30 MG/ML
15 INJECTION, SOLUTION INTRAMUSCULAR; INTRAVENOUS ONCE
Status: COMPLETED | OUTPATIENT
Start: 2020-02-22 | End: 2020-02-22

## 2020-02-22 RX ORDER — ACETAMINOPHEN 325 MG/1
650 TABLET ORAL ONCE
Status: COMPLETED | OUTPATIENT
Start: 2020-02-22 | End: 2020-02-22

## 2020-02-22 RX ADMIN — DOXYCYCLINE HYCLATE 100 MG: 100 CAPSULE ORAL at 20:00

## 2020-02-22 RX ADMIN — LIDOCAINE HYDROCHLORIDE,EPINEPHRINE BITARTRATE 10 ML: 10; .01 INJECTION, SOLUTION INFILTRATION; PERINEURAL at 20:00

## 2020-02-22 RX ADMIN — KETOROLAC TROMETHAMINE 15 MG: 30 INJECTION, SOLUTION INTRAMUSCULAR at 20:36

## 2020-02-22 RX ADMIN — ACETAMINOPHEN 650 MG: 325 TABLET, FILM COATED ORAL at 20:36

## 2020-02-23 NOTE — DISCHARGE INSTRUCTIONS
Please help with the primary care provider, please follow-up in 24 hours either with her primary care provider, here or at urgent care for wound re-evaluation

## 2020-02-26 ENCOUNTER — OFFICE VISIT (OUTPATIENT)
Dept: SURGERY | Facility: CLINIC | Age: 40
End: 2020-02-26
Payer: COMMERCIAL

## 2020-02-26 VITALS
RESPIRATION RATE: 18 BRPM | HEIGHT: 74 IN | WEIGHT: 191 LBS | BODY MASS INDEX: 24.51 KG/M2 | HEART RATE: 98 BPM | DIASTOLIC BLOOD PRESSURE: 82 MMHG | TEMPERATURE: 62 F | SYSTOLIC BLOOD PRESSURE: 124 MMHG

## 2020-02-26 DIAGNOSIS — L05.91 PILONIDAL CYST WITHOUT ABSCESS: Primary | ICD-10-CM

## 2020-02-26 PROCEDURE — 99203 OFFICE O/P NEW LOW 30 MIN: CPT | Performed by: SPECIALIST

## 2020-02-26 PROCEDURE — 11042 DBRDMT SUBQ TIS 1ST 20SQCM/<: CPT | Performed by: SPECIALIST

## 2020-02-26 PROCEDURE — 3008F BODY MASS INDEX DOCD: CPT | Performed by: SPECIALIST

## 2020-02-26 NOTE — ASSESSMENT & PLAN NOTE
The patient is a 45-year-old white male in good health who is seen add [facility]'s ER 4 days earlier, when underwent incision and drainage of a recurrent pilonidal cyst   He was given a prescription for doxycycline, instructed to follow up with surgery  The patient had a pilonidal cystectomy several years prior in [place], that ultimately went on to heal uneventfully  He states that his current episode came on fast with the development of pain and subsequent swelling and erythema  The patient is currently comfortable, and presents with a small wound below a well-healed cicatrix from previous pilonidal cystectomy  This was interrogated with a sterile hemostat, and proves to be quite superficial, and amendable to treatment in the office

## 2020-02-26 NOTE — PATIENT INSTRUCTIONS
Change the gauze dressings as necessary, but leave the packing in place untill tomorrow afternoon    Remove the packing in the shower tomorrow afternoon, wash the wound with soap and water, then place a folded 4x4 gauze between the buttocks, into the wound to keep the skin edges open  After the first dressing change, you will need to shower twice a day, wash the wound with soap and water, then dress it with a 4x4 gauze, tuck the dressing into the wound to wick away the drainage, and keep the edges from closing prematurely  Wear a sanitary napkin or panty shield in your underwear to control the drainage  Briefs work better than boxers  I will see you back every week until the wound has healed  Pilonidal Cyst   WHAT YOU SHOULD KNOW:   A pilonidal cyst is a small sac under the skin that looks like a small hole or dimple  It usually grows in the skin on your lower back, near the bottom of the spine  AFTER YOU LEAVE:   Follow up with your healthcare provider as directed: Write down your questions so you remember to ask them during your visits  Contact your primary healthcare provider if:  · You have a fever  · Your cyst is red and swollen  · Your cyst has white or yellow fluid coming out of it  · You have questions or concerns about your condition or care  © 2014 [address] is for End User's use only and may not be sold, redistributed or otherwise used for commercial purposes  All illustrations and images included in CareNotes® are the copyrighted property of [employer]  or [employer] Felipe  The above information is an  only  It is not intended as medical advice for individual conditions or treatments  Talk to your doctor, nurse or pharmacist before following any medical regimen to see if it is safe and effective for you

## 2020-02-26 NOTE — PROGRESS NOTES
Assessment/Plan:    Pilonidal cyst without abscess  The patient is a 82-year-old white male in good health who is seen add [facility] ER 4 days earlier, when underwent incision and drainage of a recurrent pilonidal cyst   He was given a prescription for doxycycline, instructed to follow up with surgery  The patient had a pilonidal cystectomy several years prior in [place], that ultimately went on to heal uneventfully  He states that his current episode came on fast with the development of pain and subsequent swelling and erythema  The patient is currently comfortable, and presents with a small wound below a well-healed cicatrix from previous pilonidal cystectomy  This was interrogated with a sterile hemostat, and proves to be quite superficial, and amendable to treatment in the office  Diagnoses and all orders for this visit:    Pilonidal cyst without abscess          Subjective:      Patient ID: Aristides Ace is a 42 y o  male  The patient is a 82-year-old white male presenting with a recurrent infected pilonidal cyst incised and drained 4 days earlier in the emergency room at [address] [place] [address] in [place]  Along with wound care, with stressed to the patient that he must stop smoking to allow the infection to resolve, and the wound to heal       The following portions of the patient's history were reviewed and updated as appropriate: allergies, current medications, past family history, past medical history, past social history, past surgical history and problem list     Review of Systems   Constitutional: Negative for chills and fever  HENT: Negative for trouble swallowing  Gastrointestinal: Negative for constipation, diarrhea, nausea and vomiting  Blood in stool: Noted bleeding from the recurrent pilonidal cyst with initial presentation  Skin: Wound:  wound in the the posterior midline below the cicatrix from her previous pilonidal cystectomy     All other systems reviewed and are negative  Objective:      /82 (BP Location: Left arm, Patient Position: Sitting, Cuff Size: Standard)   Pulse 98   Temp (!) 62 °F (16 7 °C) (Tympanic)   Resp 18   Ht 6' 2" (1 88 m)   Wt 86 6 kg (191 lb)   BMI 24 52 kg/m²          Physical Exam   Constitutional: He is oriented to person, place, and time  He appears well-developed and well-nourished  Cardiovascular: Normal rate  Pulmonary/Chest: Effort normal    Abdominal: Soft  Genitourinary:   Genitourinary Comments: External exam confirms the presence of a scar from previous pilonidal cystectomy, as well as a clean wound from an incision and drainage 4 days earlier   Neurological: He is alert and oriented to person, place, and time  Skin: Skin is warm and dry  Procedure:  Debridement of recurrent Pilonidal cyst  Informed consent obtained  Hair shaved, skin prepped with betadine  Generous anesthesia of 1% Lidocaine with epinephrine infiltrated  Sharp excisional debridement of chronic abscess with scissor, forceps & dermal curette  Hemostasis with pressure  Packed open with 1/4" iodiform, aprox 5 inches  Dry sterile dressing applied  Instructions to remove the packing, and begin BID dressing changes tomorrow  F/U in one week

## 2020-02-28 NOTE — ED PROVIDER NOTES
History  Chief Complaint   Patient presents with    Cyst     cyst on buttocks, was seen at [facility] 1 month ago, pain is worse in last 3 days  HPI  29-year-old male comes in for evaluation of pilonidal abscess  Pain is been getting worse last few days  It started to drain  Denies any fevers or chills, denies any pain with defecation does have pain with sitting  Prior to Admission Medications   Prescriptions Last Dose Informant Patient Reported? Taking? clotrimazole (LOTRIMIN) 1 % cream  Self No No   Sig: Apply topically 2 (two) times a day   Patient not taking: Reported on 10/25/2019      Facility-Administered Medications Last Administration Doses Remaining   aspirin chewable tablet 324 mg 10/1/2019 10:00 AM           Past Medical History:   Diagnosis Date    Sepsis [facility])        Past Surgical History:   Procedure Laterality Date    CYST REMOVAL      from tailbone    NO PAST SURGERIES         Family History   Problem Relation Age of Onset    Hypertension Father     Heart disease Father      I have reviewed and agree with the history as documented  E-Cigarette/Vaping    E-Cigarette Use Never User      E-Cigarette/Vaping Substances    Nicotine No     THC No     CBD No     Flavoring No     Other No     Unknown No      Social History     Tobacco Use    Smoking status: Current Every Day Smoker     Packs/day: 1 00     Types: Cigarettes    Smokeless tobacco: Never Used   Substance Use Topics    Alcohol use: Yes     Comment: socially    Drug use: No       Review of Systems   Constitutional: Negative  Negative for chills and fever  HENT: Negative  Negative for ear pain and sore throat  Eyes: Negative  Negative for pain and discharge  Respiratory: Negative  Negative for chest tightness and shortness of breath  Cardiovascular: Negative  Negative for chest pain and palpitations  Gastrointestinal: Negative  Negative for abdominal pain, nausea and vomiting     Endocrine: Negative  Negative for polyphagia and polyuria  Genitourinary: Negative  Negative for dysuria and flank pain  Musculoskeletal: Negative  Negative for arthralgias and back pain  Skin: Negative for color change and wound  abscess   Allergic/Immunologic: Negative  Negative for food allergies and immunocompromised state  Neurological: Negative  Negative for weakness and headaches  Hematological: Negative  Negative for adenopathy  Does not bruise/bleed easily  Psychiatric/Behavioral: Negative  Negative for suicidal ideas  The patient is not nervous/anxious  Physical Exam  Physical Exam   Constitutional: He is oriented to person, place, and time  He appears well-developed and well-nourished  No distress  HENT:   Head: Normocephalic and atraumatic  Right Ear: External ear normal    Left Ear: External ear normal    Mouth/Throat: Oropharynx is clear and moist    Eyes: Pupils are equal, round, and reactive to light  Conjunctivae and EOM are normal  Right eye exhibits no discharge  Left eye exhibits no discharge  No scleral icterus  Neck: Normal range of motion  Neck supple  No tracheal deviation present  No thyromegaly present  Cardiovascular: Normal rate, regular rhythm and intact distal pulses  Exam reveals no gallop and no friction rub  No murmur heard  Pulmonary/Chest: Effort normal and breath sounds normal  No stridor  No respiratory distress  He has no wheezes  He has no rales  Abdominal: Soft  Bowel sounds are normal  He exhibits no distension  There is no tenderness  There is no rebound and no guarding  Genitourinary:   Genitourinary Comments: Obvious pilonidal abscess draining purulence at the gluteal cleft  There is surrounding cellulitis  He has no tenderness to palpation of the external anus, rectal exam unremarkable, notably no pain  Musculoskeletal: Normal range of motion  He exhibits no edema or deformity     Neurological: He is alert and oriented to person, place, and time  No cranial nerve deficit  Skin: Skin is warm and dry  No rash noted  He is not diaphoretic  No erythema  Psychiatric: He has a normal mood and affect  His behavior is normal  Thought content normal    Nursing note and vitals reviewed  Vital Signs  ED Triage Vitals [02/22/20 1942]   Temperature Pulse Respirations Blood Pressure SpO2   97 5 °F (36 4 °C) 88 18 146/78 95 %      Temp Source Heart Rate Source Patient Position - Orthostatic VS BP Location FiO2 (%)   Temporal Monitor Sitting Right arm --      Pain Score       Worst Possible Pain           Vitals:    02/22/20 1942   BP: 146/78   Pulse: 88   Patient Position - Orthostatic VS: Sitting         Visual Acuity      ED Medications  Medications   lidocaine-epinephrine (XYLOCAINE/EPINEPHRINE) 1 %-1:100,000 injection 10 mL (10 mL Infiltration Given 2/22/20 2000)   doxycycline hyclate (VIBRAMYCIN) capsule 100 mg (100 mg Oral Given 2/22/20 2000)   acetaminophen (TYLENOL) tablet 650 mg (650 mg Oral Given 2/22/20 2036)   ketorolac (TORADOL) injection 15 mg (15 mg Intravenous Given 2/22/20 2036)       Diagnostic Studies  Results Reviewed     None                 No orders to display              Procedures  Incision and drain  Date/Time: 2/22/2020 8:00 PM  Performed by: Loco Melissa MD  Authorized by: Loco Melissa MD     Unsuccessful Attempt: unsuccessful attempt    Patient location:  ED  Consent:     Consent obtained:  Verbal    Consent given by:  Patient    Risks discussed:  Bleeding, incomplete drainage and infection    Alternatives discussed:  No treatment  Universal protocol:     Patient identity confirmed:  Verbally with patient  Location:     Type:  Pilonidal cyst    Size:  1cm    Location:  Anogenital    Anogenital location:  Pilonidal  Pre-procedure details:     Skin preparation:  Betadine  Anesthesia (see MAR for exact dosages):      Anesthesia method:  Local infiltration    Local anesthetic:  Lidocaine 1% WITH epi  Procedure details:     Complexity:  Simple    Needle aspiration: no      Incision types:  Stab incision    Scalpel blade:  11    Incision depth:  Subcutaneous    Wound management:  Irrigated with saline and probed and deloculated    Drainage:  Purulent    Drainage amount:  Scant    Wound treatment:  Wound left open    Packing materials:  None  Post-procedure details:     Patient tolerance of procedure: Tolerated well, no immediate complications  Comments:      Made 2 incisions  1st incision was where the purulence was coming out, had scant purulence drainage return  Made another small incision adjacent that only had blood return  Attempted the loculation a but did not reveal any other purulence  ED Course      incision and drainage is remarkable for there was not much drainage  Either missed the abscess pocket verses he was already drained  Will leave it open and put the patient on antibiotics  A wound check in the next 24 hours as with the manipulation that I have done, there is any other purulence than the abscess will likely get worse in the next couple days  I personally discussed return precautions with this patient and family  I provided the patient with written discharge instructions and particularly highlighted specific areas of interest to this patient, including but not limited to: medications for symptom managment, follow up recommendations, and return precautions  Patient and family are in agreement with this plan as outlined above  MDM  Number of Diagnoses or Management Options  Pilonidal cyst with abscess:   Diagnosis management comments: 61-year-old man presents with pilonidal abscess, no evidence of perirectal abscess  Will do incision and drainage and treat with antibiotics          Disposition  Final diagnoses:   Pilonidal cyst with abscess     Time reflects when diagnosis was documented in both MDM as applicable and the Disposition within this note     Time User Action Codes Description Comment    2/22/2020  8:25 PM Florecita Puga Add [L05 01] Pilonidal cyst with abscess       ED Disposition     ED Disposition Condition Date/Time Comment    Discharge Stable Sat Feb 22, 2020  8:25 PM Steffany Syed discharge to home/self care  Follow-up Information     Follow up With Specialties Details Why Contact Info Additional Information    Adela Wiley PA-C Family Medicine, Physician Assistant Schedule an appointment as soon as possible for a visit in 3 days follow up being seen in the emergency department [address] [address]       [facility] Emergency Department Emergency Medicine Go in 1 day follow up being seen in the emergency department, for wound check [place] [id number] [phone] [place] ED, [address], [address] Urgent Care Go in 1 day follow up being seen in the emergency department,, For wound re-check [address] [place] [address] [id number] [address] [place] [address], [id number]          Discharge Medication List as of 2/22/2020  8:32 PM      START taking these medications    Details   doxycycline hyclate (VIBRAMYCIN) 100 mg capsule Take 1 capsule (100 mg total) by mouth 2 (two) times a day for 7 days, Starting Sat 2/22/2020, Until Sat 2/29/2020, Normal         CONTINUE these medications which have NOT CHANGED    Details   clotrimazole (LOTRIMIN) 1 % cream Apply topically 2 (two) times a day, Starting Mon 10/7/2019, Normal           No discharge procedures on file      PDMP Review     None          ED Provider  Electronically Signed by           Vladimir Epperson MD  02/28/20 0139

## 2020-03-11 ENCOUNTER — OFFICE VISIT (OUTPATIENT)
Dept: SURGERY | Facility: CLINIC | Age: 40
End: 2020-03-11
Payer: COMMERCIAL

## 2020-03-11 VITALS
TEMPERATURE: 98.3 F | HEART RATE: 97 BPM | SYSTOLIC BLOOD PRESSURE: 118 MMHG | HEIGHT: 74 IN | RESPIRATION RATE: 18 BRPM | WEIGHT: 196 LBS | DIASTOLIC BLOOD PRESSURE: 76 MMHG | BODY MASS INDEX: 25.15 KG/M2

## 2020-03-11 DIAGNOSIS — L05.91 PILONIDAL CYST WITHOUT ABSCESS: Primary | ICD-10-CM

## 2020-03-11 PROCEDURE — 3008F BODY MASS INDEX DOCD: CPT | Performed by: SPECIALIST

## 2020-03-11 PROCEDURE — 99212 OFFICE O/P EST SF 10 MIN: CPT | Performed by: SPECIALIST

## 2020-03-11 NOTE — ASSESSMENT & PLAN NOTE
The patient is 14 days out from debridement of recurrent pilonidal cyst   This is a late recurrence not a failure of his previous pilonidal cystectomy  The patient has been compliant with b i d  Dressing changes including showering twice a day and keeping a gauze dressing in place  He notes decreased drainage from the wound, and is back to full activity  On exam the wound is granulating in, and epithelializing from the margins  It was washed with Hibiclens, and the fibrinous material within the wound removed  The surrounding hair was shaved, and it was redressed with a dry gauze dressing  The patient will continue to shower twice a day and keep a dressing in place  He will follow up in 1 week

## 2020-03-11 NOTE — PATIENT INSTRUCTIONS
Continue to shower twice a day, and keep a gauze dressing in place, tucked between the buttocks  Please stop smoking

## 2020-03-11 NOTE — PROGRESS NOTES
Assessment/Plan:    Pilonidal cyst without abscess  The patient is 14 days out from debridement of recurrent pilonidal cyst   This is a late recurrence not a failure of his previous pilonidal cystectomy  The patient has been compliant with b i d  Dressing changes including showering twice a day and keeping a gauze dressing in place  He notes decreased drainage from the wound, and is back to full activity  On exam the wound is granulating in, and epithelializing from the margins  It was washed with Hibiclens, and the fibrinous material within the wound removed  The surrounding hair was shaved, and it was redressed with a dry gauze dressing  The patient will continue to shower twice a day and keep a dressing in place  He will follow up in 1 week  Diagnoses and all orders for this visit:    Pilonidal cyst without abscess          Subjective:      Patient ID: Lottie Rubin is a 47 y o  male      HPI    The following portions of the patient's history were reviewed and updated as appropriate: allergies, current medications, past family history, past medical history, past social history, past surgical history and problem list     Review of Systems      Objective:      /76 (BP Location: Left arm, Patient Position: Sitting, Cuff Size: Standard)   Pulse 97   Temp 98 3 °F (36 8 °C) (Tympanic)   Resp 18   Ht 6' 2" (1 88 m)   Wt 88 9 kg (196 lb)   BMI 25 16 kg/m²          Physical Exam

## 2020-03-17 ENCOUNTER — TELEPHONE (OUTPATIENT)
Dept: SURGERY | Facility: CLINIC | Age: 40
End: 2020-03-17

## 2020-03-25 ENCOUNTER — OFFICE VISIT (OUTPATIENT)
Dept: SURGERY | Facility: CLINIC | Age: 40
End: 2020-03-25
Payer: COMMERCIAL

## 2020-03-25 VITALS
TEMPERATURE: 98.4 F | HEIGHT: 74 IN | HEART RATE: 94 BPM | SYSTOLIC BLOOD PRESSURE: 120 MMHG | RESPIRATION RATE: 18 BRPM | BODY MASS INDEX: 25.15 KG/M2 | DIASTOLIC BLOOD PRESSURE: 72 MMHG | WEIGHT: 196 LBS

## 2020-03-25 DIAGNOSIS — L05.91 PILONIDAL CYST WITHOUT ABSCESS: ICD-10-CM

## 2020-03-25 PROCEDURE — 11042 DBRDMT SUBQ TIS 1ST 20SQCM/<: CPT | Performed by: SPECIALIST

## 2020-03-25 PROCEDURE — 99212 OFFICE O/P EST SF 10 MIN: CPT | Performed by: SPECIALIST

## 2020-03-25 PROCEDURE — 3008F BODY MASS INDEX DOCD: CPT | Performed by: SPECIALIST

## 2020-03-25 NOTE — ASSESSMENT & PLAN NOTE
It has been 2 weeks since his last visit for a recurrent pilonidal cyst   In the panic surrounding the corona virus pandemic, he has been Slacking off" with wound care for the recurrent pilonidal cyst     He notes some drainage, but no pain, he is unable to shower twice a day  On exam the wound is frida into a sinus which appears to be filled with debris  The wound was then washed with Hibiclens, anesthetized with 1% lidocaine with epinephrine injection, and sharp excisional debridement performed with a dermal curette  Subsequent to this the prematurely coapted get his were excised with a 15 scalpel, and the edges debrided with the dermal curette  This constitutes sharp excisional surgical debridement of the skin and subcutaneous tissue  Hair and loose debris was removed  Hemostasis was obtained using pressure, the wound was irrigated with wound cleanser and packed open with a 2 x 2 gauze  The patient will enlist the aid of his wife to help him with b i d  Dressing changes and I will see him in follow-up in 1 week

## 2020-03-25 NOTE — PATIENT INSTRUCTIONS
Wash the wound twice a day in the shower with soap and water, and keep the edge of a 2 x 2 gauze within the wound to wick away drainage  Follow-up in 1 week

## 2020-03-25 NOTE — PROGRESS NOTES
Assessment/Plan:    Pilonidal cyst without abscess  It has been 2 weeks since his last visit for a recurrent pilonidal cyst   In the panic surrounding the corona virus pandemic, he has been Slacking off" with wound care for the recurrent pilonidal cyst     He notes some drainage, but no pain, he is unable to shower twice a day  On exam the wound is frida into a sinus which appears to be filled with debris  The wound was then washed with Hibiclens, anesthetized with 1% lidocaine with epinephrine injection, and sharp excisional debridement performed with a dermal curette  Subsequent to this the prematurely coapted get his were excised with a 15 scalpel, and the edges debrided with the dermal curette  This constitutes sharp excisional surgical debridement of the skin and subcutaneous tissue  Hair and loose debris was removed  Hemostasis was obtained using pressure, the wound was irrigated with wound cleanser and packed open with a 2 x 2 gauze  The patient will enlist the aid of his wife to help him with b i d  Dressing changes and I will see him in follow-up in 1 week  There are no diagnoses linked to this encounter  Subjective:      Patient ID: Tu Maurer is a 47 y o  male      HPI    The following portions of the patient's history were reviewed and updated as appropriate: allergies, current medications, past family history, past medical history, past social history, past surgical history and problem list     Review of Systems      Objective:      /72 (BP Location: Left arm, Patient Position: Sitting, Cuff Size: Standard)   Pulse 94   Temp 98 4 °F (36 9 °C) (Tympanic)   Resp 18   Ht 6' 2" (1 88 m)   Wt 88 9 kg (196 lb)   BMI 25 16 kg/m²          Physical Exam

## 2020-04-01 ENCOUNTER — OFFICE VISIT (OUTPATIENT)
Dept: SURGERY | Facility: CLINIC | Age: 40
End: 2020-04-01
Payer: COMMERCIAL

## 2020-04-01 VITALS
TEMPERATURE: 98.2 F | HEART RATE: 92 BPM | WEIGHT: 196 LBS | RESPIRATION RATE: 18 BRPM | DIASTOLIC BLOOD PRESSURE: 74 MMHG | HEIGHT: 74 IN | BODY MASS INDEX: 25.15 KG/M2 | SYSTOLIC BLOOD PRESSURE: 122 MMHG

## 2020-04-01 DIAGNOSIS — L05.91 PILONIDAL CYST WITHOUT ABSCESS: Primary | ICD-10-CM

## 2020-04-01 PROCEDURE — 3008F BODY MASS INDEX DOCD: CPT | Performed by: SPECIALIST

## 2020-04-01 PROCEDURE — 99212 OFFICE O/P EST SF 10 MIN: CPT | Performed by: SPECIALIST

## 2020-04-01 PROCEDURE — 97597 DBRDMT OPN WND 1ST 20 CM/<: CPT | Performed by: SPECIALIST

## 2020-04-08 ENCOUNTER — OFFICE VISIT (OUTPATIENT)
Dept: SURGERY | Facility: CLINIC | Age: 40
End: 2020-04-08
Payer: COMMERCIAL

## 2020-04-08 VITALS
RESPIRATION RATE: 18 BRPM | HEIGHT: 74 IN | WEIGHT: 196 LBS | SYSTOLIC BLOOD PRESSURE: 120 MMHG | DIASTOLIC BLOOD PRESSURE: 76 MMHG | BODY MASS INDEX: 25.15 KG/M2 | TEMPERATURE: 97.6 F | HEART RATE: 92 BPM

## 2020-04-08 DIAGNOSIS — L05.91 PILONIDAL CYST WITHOUT ABSCESS: Primary | ICD-10-CM

## 2020-04-08 PROCEDURE — 3008F BODY MASS INDEX DOCD: CPT | Performed by: SPECIALIST

## 2020-04-08 PROCEDURE — 99212 OFFICE O/P EST SF 10 MIN: CPT | Performed by: SPECIALIST

## 2020-04-15 ENCOUNTER — OFFICE VISIT (OUTPATIENT)
Dept: SURGERY | Facility: CLINIC | Age: 40
End: 2020-04-15
Payer: COMMERCIAL

## 2020-04-15 VITALS
BODY MASS INDEX: 25.15 KG/M2 | TEMPERATURE: 97.9 F | WEIGHT: 196 LBS | SYSTOLIC BLOOD PRESSURE: 128 MMHG | HEART RATE: 90 BPM | HEIGHT: 74 IN | DIASTOLIC BLOOD PRESSURE: 86 MMHG | RESPIRATION RATE: 18 BRPM

## 2020-04-15 DIAGNOSIS — L05.91 PILONIDAL CYST WITHOUT ABSCESS: Primary | ICD-10-CM

## 2020-04-15 PROCEDURE — 99212 OFFICE O/P EST SF 10 MIN: CPT | Performed by: SPECIALIST

## 2020-04-15 PROCEDURE — 3008F BODY MASS INDEX DOCD: CPT | Performed by: SPECIALIST

## 2020-04-22 ENCOUNTER — OFFICE VISIT (OUTPATIENT)
Dept: SURGERY | Facility: CLINIC | Age: 40
End: 2020-04-22
Payer: COMMERCIAL

## 2020-04-22 VITALS — TEMPERATURE: 99 F

## 2020-04-22 DIAGNOSIS — L05.91 PILONIDAL CYST WITHOUT ABSCESS: Primary | ICD-10-CM

## 2020-04-22 PROCEDURE — 99212 OFFICE O/P EST SF 10 MIN: CPT | Performed by: SPECIALIST

## 2020-07-21 ENCOUNTER — HOSPITAL ENCOUNTER (EMERGENCY)
Facility: HOSPITAL | Age: 40
Discharge: HOME | End: 2020-07-21
Attending: EMERGENCY MEDICINE | Admitting: EMERGENCY MEDICINE
Payer: COMMERCIAL

## 2020-07-21 ENCOUNTER — APPOINTMENT (EMERGENCY)
Dept: RADIOLOGY | Facility: HOSPITAL | Age: 40
End: 2020-07-21
Payer: COMMERCIAL

## 2020-07-21 VITALS
RESPIRATION RATE: 17 BRPM | BODY MASS INDEX: 24.25 KG/M2 | HEART RATE: 72 BPM | OXYGEN SATURATION: 99 % | WEIGHT: 188.93 LBS | TEMPERATURE: 98.1 F | SYSTOLIC BLOOD PRESSURE: 137 MMHG | DIASTOLIC BLOOD PRESSURE: 85 MMHG | HEIGHT: 74 IN

## 2020-07-21 DIAGNOSIS — M25.572 LEFT ANKLE PAIN: Primary | ICD-10-CM

## 2020-07-21 PROCEDURE — 73610 X-RAY EXAM OF ANKLE: CPT

## 2020-07-21 PROCEDURE — 99284 EMERGENCY DEPT VISIT MOD MDM: CPT | Performed by: EMERGENCY MEDICINE

## 2020-07-21 PROCEDURE — 99283 EMERGENCY DEPT VISIT LOW MDM: CPT

## 2020-07-21 RX ORDER — ACETAMINOPHEN 325 MG/1
650 TABLET ORAL ONCE
Status: COMPLETED | OUTPATIENT
Start: 2020-07-21 | End: 2020-07-21

## 2020-07-21 RX ADMIN — ACETAMINOPHEN 650 MG: 325 TABLET, FILM COATED ORAL at 18:36

## 2020-07-22 ENCOUNTER — OFFICE VISIT (OUTPATIENT)
Dept: OBGYN CLINIC | Facility: CLINIC | Age: 40
End: 2020-07-22
Payer: COMMERCIAL

## 2020-07-22 VITALS
HEART RATE: 90 BPM | RESPIRATION RATE: 18 BRPM | SYSTOLIC BLOOD PRESSURE: 128 MMHG | HEIGHT: 74 IN | BODY MASS INDEX: 24.13 KG/M2 | WEIGHT: 188 LBS | TEMPERATURE: 98.5 F | DIASTOLIC BLOOD PRESSURE: 86 MMHG

## 2020-07-22 DIAGNOSIS — M79.662 PAIN IN LEFT LOWER LEG: ICD-10-CM

## 2020-07-22 DIAGNOSIS — M76.62 LEFT ACHILLES TENDINITIS: Primary | ICD-10-CM

## 2020-07-22 PROCEDURE — 99244 OFF/OP CNSLTJ NEW/EST MOD 40: CPT | Performed by: ORTHOPAEDIC SURGERY

## 2020-07-22 PROCEDURE — 3008F BODY MASS INDEX DOCD: CPT | Performed by: SPECIALIST

## 2020-07-22 RX ORDER — NAPROXEN 500 MG/1
500 TABLET ORAL 2 TIMES DAILY WITH MEALS
Qty: 60 TABLET | Refills: 1 | Status: SHIPPED | OUTPATIENT
Start: 2020-07-22 | End: 2022-08-02

## 2020-07-22 NOTE — PROGRESS NOTES
ASSESSMENT/PLAN:    Diagnoses and all orders for this visit:    Left Achilles tendinitis  -     naproxen (NAPROSYN) 500 mg tablet; Take 1 tablet (500 mg total) by mouth 2 (two) times a day with meals    Pain in left lower leg        Plan:  I offered him the option of physical therapy versus home exercises  He would like to try home exercises before any formal physical therapy  This was reviewed and he was instructed to perform exercises routinely  Anti-inflammatory medications were prescribed  He is to contact me if any questions or concerns arise prior to follow-up in 4 weeks  He is to avoid activities which seem to aggravate his pain  Return in about 4 weeks (around 8/19/2020)  _____________________________________________________  CHIEF COMPLAINT:  Chief Complaint   Patient presents with    Left Leg - Pain         SUBJECTIVE:  Shaquille Morfin is a 47y o  year old male who presents for evaluation of left lower leg pain of approximately 1 month duration without history of injury or change in activity level  He complains of a significant degree of stiffness and pain when he awakens in the morning and has a hard time getting out of bed to take care of his 3 kids  Pain will persist throughout the day but does not seem as severe as it is when he awakens in the morning  He complains of pain in the posterior calf musculature, the lateral aspect of the lower 3rd of the lower leg and the Achilles tendon  He denies any right-sided symptoms  He has lower back pain on a chronic nature but denies any change in symptoms  He denies any lower extremity paresthesias  He denies bowel or bladder dysfunction        PAST MEDICAL HISTORY:  Past Medical History:   Diagnosis Date    Sepsis ([facility] [address] )        PAST SURGICAL HISTORY:  Past Surgical History:   Procedure Laterality Date    CYST REMOVAL      from tailbone    NO PAST SURGERIES         FAMILY HISTORY:  Family History   Problem Relation Age of Onset    Hypertension Father     Heart disease Father        SOCIAL HISTORY:  Social History     Tobacco Use    Smoking status: Current Every Day Smoker     Packs/day: 1 00     Types: Cigarettes    Smokeless tobacco: Never Used   Substance Use Topics    Alcohol use: Not Currently     Comment: socially    Drug use: No       MEDICATIONS:    Current Outpatient Medications:     clotrimazole (LOTRIMIN) 1 % cream, Apply topically 2 (two) times a day (Patient not taking: Reported on 7/21/2020), Disp: 30 g, Rfl: 0    naproxen (NAPROSYN) 500 mg tablet, Take 1 tablet (500 mg total) by mouth 2 (two) times a day with meals, Disp: 60 tablet, Rfl: 1    Current Facility-Administered Medications:     aspirin chewable tablet 324 mg, 324 mg, Oral, Daily, Shea Baez PA-C, 324 mg at 10/01/19 1000    ALLERGIES:  Allergies   Allergen Reactions    Bee Venom        Review of systems:   Constitutional: Negative for fatigue, fever or loss of apetite  HENT: Negative  Respiratory: Negative for shortness of breath, dyspnea  Cardiovascular: Negative for chest pain/tightness  Gastrointestinal: Negative for abdominal pain, N/V  Endocrine: Negative for cold/heat intolerance, unexplained weight loss/gain  Genitourinary: Negative for flank pain, dysuria, hematuria  Musculoskeletal:  Positive as in the HPI   Skin: Negative for rash  Neurological:  Negative  Psychiatric/Behavioral: Negative for agitation  _____________________________________________________  PHYSICAL EXAMINATION:    Blood pressure 128/86, pulse 90, temperature 98 5 °F (36 9 °C), resp  rate 18, height 6' 2" (1 88 m), weight 85 3 kg (188 lb)  General: well developed and well nourished, alert, oriented times 3 and appears comfortable  Psychiatric: Normal  HEENT: Benign  Cardiovascular: Regular    Pulmonary: No wheezing or stridor  Abdomen: Soft, Nontender  Skin: No masses, erythema, lacerations, fluctation, ulcerations  Neurovascular:   Motor and sensory exams are grossly intact, radicular signs are absent and pulses are palpable  MUSCULOSKELETAL EXAMINATION:    The left lower extremity exam demonstrates good range of motion of the hip and knee without complaint  Straight leg raising in sitting root signs were negative for radicular symptoms  He does complain of Achilles tendon and calf musculature pain with forced, passive ankle dorsiflexion but states that this actually worsens with knee flexion  He does complain of tenderness to palpation of the calf musculature although calf compartments are soft  Homans sign is negative  He has significant tenderness over the distal Achilles tendon without swelling or thickening  Edwards test is negative  The remainder of the lower extremity examination bilaterally is benign  _____________________________________________________  STUDIES REVIEWED:  X-rays of his ankle were reviewed demonstrating no abnormalities  The report was reviewed        Rajat Klein

## 2020-07-23 NOTE — ED PROVIDER NOTES
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 Taking? clotrimazole (LOTRIMIN) 1 % cream Not Taking at Unknown time Self No No   Sig: Apply topically 2 (two) times a day   Patient not taking: Reported on 7/21/2020      Facility-Administered Medications Last Administration Doses Remaining   aspirin chewable tablet 324 mg 10/1/2019 10:00 AM           Past Medical History:   Diagnosis Date    Sepsis [facility])        Past Surgical History:   Procedure Laterality Date    CYST REMOVAL      from tailbone    NO PAST SURGERIES         Family History   Problem Relation Age of Onset    Hypertension Father     Heart disease Father      I have reviewed and agree with the history as documented  E-Cigarette/Vaping    E-Cigarette Use Never User      E-Cigarette/Vaping Substances    Nicotine Yes     THC No     CBD No     Flavoring No     Other No     Unknown No      Social History     Tobacco Use    Smoking status: Current Every Day Smoker     Packs/day: 1 00     Types: Cigarettes    Smokeless tobacco: Never Used   Substance Use Topics    Alcohol use: Not Currently     Comment: socially    Drug use: No       Review of Systems   Constitutional: Negative  Negative for chills and fever  HENT: Negative  Negative for ear pain and sore throat  Eyes: Negative  Negative for pain and discharge  Respiratory: Negative  Negative for chest tightness and shortness of breath  Cardiovascular: Negative  Negative for chest pain and palpitations  Gastrointestinal: Negative  Negative for abdominal pain, nausea and vomiting  Endocrine: Negative  Negative for polyphagia and polyuria  Genitourinary: Negative  Negative for dysuria and flank pain  Musculoskeletal: Positive for arthralgias (Left ankle pain)  Negative for back pain  Skin: Negative  Negative for color change and wound  Allergic/Immunologic: Negative  Negative for food allergies and immunocompromised state  Neurological: Negative  Negative for weakness and headaches  Hematological: Negative  Negative for adenopathy  Does not bruise/bleed easily  Psychiatric/Behavioral: Negative  Negative for suicidal ideas  The patient is not nervous/anxious  Physical Exam  Physical Exam   Constitutional: He is oriented to person, place, and time  He appears well-developed and well-nourished  No distress  HENT:   Head: Normocephalic and atraumatic  Right Ear: External ear normal    Left Ear: External ear normal    Mouth/Throat: Oropharynx is clear and moist    Eyes: Pupils are equal, round, and reactive to light  Conjunctivae and EOM are normal  Right eye exhibits no discharge  Left eye exhibits no discharge  No scleral icterus  Neck: Normal range of motion  Neck supple  No tracheal deviation present  No thyromegaly present  Cardiovascular: Normal rate, regular rhythm and intact distal pulses  Exam reveals no gallop and no friction rub  No murmur heard  Pulmonary/Chest: Effort normal and breath sounds normal  No stridor  No respiratory distress  He has no wheezes  He has no rales  Abdominal: Soft  Bowel sounds are normal  He exhibits no distension  There is no tenderness  There is no rebound and no guarding  Musculoskeletal: He exhibits tenderness (Patient has tenderness at the left medial and lateral malleolus  No swelling, no ecchymosis )   He exhibits no edema or deformity  Neurological: He is alert and oriented to person, place, and time  No cranial nerve deficit  Skin: Skin is warm and dry  No rash noted  He is not diaphoretic  No erythema  Psychiatric: He has a normal mood and affect  His behavior is normal  Thought content normal    Nursing note and vitals reviewed  Vital Signs  ED Triage Vitals [07/21/20 1810]   Temperature Pulse Respirations Blood Pressure SpO2   98 1 °F (36 7 °C) 72 17 137/85 99 %      Temp Source Heart Rate Source Patient Position - Orthostatic VS BP Location FiO2 (%)   Temporal Monitor Sitting Left arm --      Pain Score       8           Vitals:    07/21/20 1810   BP: 137/85   Pulse: 72   Patient Position - Orthostatic VS: Sitting         Visual Acuity      ED Medications  Medications   acetaminophen (TYLENOL) tablet 650 mg (650 mg Oral Given 7/21/20 1836)       Diagnostic Studies  Results Reviewed     None                 XR ankle 3+ views LEFT   ED Interpretation by Palma Rangel MD (07/21 1853)   No fracture appreciated      Final Result by Bhavana Cullen DO (07/21 2042)      No acute osseous abnormality  Workstation performed: [id number]                    Procedures  Procedures         ED Course        X-rays normal   Will given Ace wrap and have follow-up with his primary care doctor or Orthopedics  US AUDIT      Most Recent Value   Initial Alcohol Screen: US AUDIT-C    1  How often do you have a drink containing alcohol?  0 Filed at: 07/21/2020 1811   2  How many drinks containing alcohol do you have on a typical day you are drinking? 0 Filed at: 07/21/2020 1811   3a  Male UNDER 65: How often do you have five or more drinks on one occasion? 0 Filed at: 07/21/2020 1811   Audit-C Score  0 Filed at: 07/21/2020 1811                  DARA/DAST-10      Most Recent Value   How many times in the past year have you    Used an illegal drug or used a prescription medication for non-medical reasons?   Never Filed at: 07/21/2020 1811                                [facility]  Number of Diagnoses or Management Options  Left ankle pain:   Diagnosis management comments: 37-year-old male presents left ankle pain  Will get an x-ray  He has no tenderness at the head of the fibula, his knee is unremarkable  If x-ray is normal, will have him follow up with Orthopedics  Disposition  Final diagnoses:   Left ankle pain     Time reflects when diagnosis was documented in both MDM as applicable and the Disposition within this note     Time User Action Codes Description Comment    7/21/2020  7:05 PM Adam Reilly Add [T58 518] Left ankle pain       ED Disposition     ED Disposition Condition Date/Time Comment    Discharge Stable Tue Jul 21, 2020  7:05 PM Kasi Cherelle discharge to home/self care  Follow-up Information     Follow up With Specialties Details Why Contact Info Additional Information    Jose Daniel Jeter PA-C Family Medicine, Physician Assistant Call in 1 day follow up being seen in the emergency department [address] [facility] [address] [facility] Bernardo Levi ([place] [address])       [facility] Emergency Department Emergency Medicine Go to  As needed, If symptoms worsen [place] [id number]  [phone] [place] ED, [address], [address] Marie MD Orthopedic Surgery Call in 1 day follow up being seen in the emergency department [address],  [place] [address] [phone]             Discharge Medication List as of 7/21/2020  7:05 PM      CONTINUE these medications which have NOT CHANGED    Details   clotrimazole (LOTRIMIN) 1 % cream Apply topically 2 (two) times a day, Starting Mon 10/7/2019, Normal           No discharge procedures on file      PDMP Review     None          ED Provider  Electronically Signed by           Giancarlo Holt MD  07/22/20 2989

## 2020-10-16 NOTE — DISCHARGE INSTRUCTIONS
PROCEDURE INFORMATION: 

Exam: US Duplex Right Upper Extremity Veins, Limited 

Exam date and time: 10/16/2020 7:39 PM 

Age: 89 years old 

Clinical indication: Arm, lower; Right; Patient HX: Patient states RT elbow 

pain; Additional info: Arm swelling 



TECHNIQUE: 

Imaging protocol: Real-time Duplex ultrasound of the Right Upper Extremity with 

2-D gray scale, color Doppler flow and spectral waveform analysis with image 

documentation. Limited exam focused on the right upper extremity veins. 



COMPARISON: 

No relevant prior studies available. 



FINDINGS: 

Right deep veins: Unremarkable. Axillary and brachial veins are patent 

throughout without thrombus. Normal Doppler waveforms. Normal compressibility 

and/or augmentation response. Visualized internal jugular and subclavian veins 

are patent. 

Right superficial veins: Unremarkable. Visualized cephalic and basilic veins 

are patent without thrombus.  



Soft tissues: Unremarkable. 



IMPRESSION: 

No sonographic evidence of deep vein thrombosis. 



Electronically signed by: Silvio Curtis On 10/16/2020  19:49:26 PM Please follow-up with your primary care provider  If anything changes or worsens, please return to the emergency department

## 2021-11-19 ENCOUNTER — HOSPITAL ENCOUNTER (EMERGENCY)
Facility: HOSPITAL | Age: 41
Discharge: HOME | End: 2021-11-19
Attending: EMERGENCY MEDICINE
Payer: COMMERCIAL

## 2021-11-19 VITALS
TEMPERATURE: 98.2 F | WEIGHT: 198 LBS | HEART RATE: 76 BPM | BODY MASS INDEX: 25.42 KG/M2 | SYSTOLIC BLOOD PRESSURE: 154 MMHG | RESPIRATION RATE: 18 BRPM | DIASTOLIC BLOOD PRESSURE: 95 MMHG | OXYGEN SATURATION: 99 %

## 2021-11-19 DIAGNOSIS — I49.3 PVC (PREMATURE VENTRICULAR CONTRACTION): Primary | ICD-10-CM

## 2021-11-19 LAB
ANION GAP SERPL CALCULATED.3IONS-SCNC: 8 MMOL/L (ref 4–13)
BASOPHILS # BLD AUTO: 0.04 THOUSANDS/ΜL (ref 0–0.1)
BASOPHILS NFR BLD AUTO: 1 % (ref 0–1)
BUN SERPL-MCNC: 6 MG/DL (ref 5–25)
CALCIUM SERPL-MCNC: 8.7 MG/DL (ref 8.3–10.1)
CHLORIDE SERPL-SCNC: 100 MMOL/L (ref 100–108)
CO2 SERPL-SCNC: 28 MMOL/L (ref 21–32)
CREAT SERPL-MCNC: 0.96 MG/DL (ref 0.6–1.3)
EOSINOPHIL # BLD AUTO: 0.19 THOUSAND/ΜL (ref 0–0.61)
EOSINOPHIL NFR BLD AUTO: 2 % (ref 0–6)
ERYTHROCYTE [DISTWIDTH] IN BLOOD BY AUTOMATED COUNT: 11.9 % (ref 11.6–15.1)
GFR SERPL CREATININE-BSD FRML MDRD: 99 ML/MIN/1.73SQ M
GLUCOSE SERPL-MCNC: 92 MG/DL (ref 65–140)
HCT VFR BLD AUTO: 45.4 % (ref 36.5–49.3)
HGB BLD-MCNC: 15.6 G/DL (ref 12–17)
IMM GRANULOCYTES # BLD AUTO: 0.03 THOUSAND/UL (ref 0–0.2)
IMM GRANULOCYTES NFR BLD AUTO: 0 % (ref 0–2)
LYMPHOCYTES # BLD AUTO: 3.71 THOUSANDS/ΜL (ref 0.6–4.47)
LYMPHOCYTES NFR BLD AUTO: 44 % (ref 14–44)
MAGNESIUM SERPL-MCNC: 2 MG/DL (ref 1.6–2.6)
MCH RBC QN AUTO: 30.5 PG (ref 26.8–34.3)
MCHC RBC AUTO-ENTMCNC: 34.4 G/DL (ref 31.4–37.4)
MCV RBC AUTO: 89 FL (ref 82–98)
MONOCYTES # BLD AUTO: 0.81 THOUSAND/ΜL (ref 0.17–1.22)
MONOCYTES NFR BLD AUTO: 10 % (ref 4–12)
NEUTROPHILS # BLD AUTO: 3.62 THOUSANDS/ΜL (ref 1.85–7.62)
NEUTS SEG NFR BLD AUTO: 43 % (ref 43–75)
NRBC BLD AUTO-RTO: 0 /100 WBCS
PLATELET # BLD AUTO: 233 THOUSANDS/UL (ref 149–390)
PMV BLD AUTO: 11.3 FL (ref 8.9–12.7)
POTASSIUM SERPL-SCNC: 3.7 MMOL/L (ref 3.5–5.3)
RBC # BLD AUTO: 5.12 MILLION/UL (ref 3.88–5.62)
SODIUM SERPL-SCNC: 136 MMOL/L (ref 136–145)
TSH SERPL DL<=0.05 MIU/L-ACNC: 2.43 UIU/ML (ref 0.36–3.74)
WBC # BLD AUTO: 8.4 THOUSAND/UL (ref 4.31–10.16)

## 2021-11-19 PROCEDURE — 84443 ASSAY THYROID STIM HORMONE: CPT | Performed by: EMERGENCY MEDICINE

## 2021-11-19 PROCEDURE — 93005 ELECTROCARDIOGRAM TRACING: CPT

## 2021-11-19 PROCEDURE — 85025 COMPLETE CBC W/AUTO DIFF WBC: CPT | Performed by: EMERGENCY MEDICINE

## 2021-11-19 PROCEDURE — 80048 BASIC METABOLIC PNL TOTAL CA: CPT | Performed by: EMERGENCY MEDICINE

## 2021-11-19 PROCEDURE — 83735 ASSAY OF MAGNESIUM: CPT | Performed by: EMERGENCY MEDICINE

## 2021-11-19 PROCEDURE — 99285 EMERGENCY DEPT VISIT HI MDM: CPT | Performed by: EMERGENCY MEDICINE

## 2021-11-19 PROCEDURE — 36415 COLL VENOUS BLD VENIPUNCTURE: CPT | Performed by: EMERGENCY MEDICINE

## 2021-11-19 PROCEDURE — 99284 EMERGENCY DEPT VISIT MOD MDM: CPT

## 2021-11-26 LAB
ATRIAL RATE: 71 BPM
P AXIS: 38 DEGREES
PR INTERVAL: 158 MS
QRS AXIS: 11 DEGREES
QRSD INTERVAL: 98 MS
QT INTERVAL: 416 MS
QTC INTERVAL: 452 MS
T WAVE AXIS: 62 DEGREES
VENTRICULAR RATE: 71 BPM

## 2021-11-26 PROCEDURE — 93010 ELECTROCARDIOGRAM REPORT: CPT | Performed by: INTERNAL MEDICINE

## 2021-12-03 ENCOUNTER — TELEPHONE (OUTPATIENT)
Dept: FAMILY MEDICINE CLINIC | Facility: CLINIC | Age: 41
End: 2021-12-03

## 2022-08-02 ENCOUNTER — APPOINTMENT (EMERGENCY)
Dept: RADIOLOGY | Facility: HOSPITAL | Age: 42
End: 2022-08-02
Payer: COMMERCIAL

## 2022-08-02 ENCOUNTER — APPOINTMENT (EMERGENCY)
Dept: CT IMAGING | Facility: HOSPITAL | Age: 42
End: 2022-08-02
Payer: COMMERCIAL

## 2022-08-02 ENCOUNTER — HOSPITAL ENCOUNTER (EMERGENCY)
Facility: HOSPITAL | Age: 42
Discharge: HOME | End: 2022-08-02
Attending: EMERGENCY MEDICINE
Payer: COMMERCIAL

## 2022-08-02 VITALS
WEIGHT: 198 LBS | SYSTOLIC BLOOD PRESSURE: 119 MMHG | RESPIRATION RATE: 18 BRPM | TEMPERATURE: 97.2 F | BODY MASS INDEX: 25.42 KG/M2 | OXYGEN SATURATION: 95 % | HEART RATE: 62 BPM | DIASTOLIC BLOOD PRESSURE: 88 MMHG
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
## 2022-08-02 DIAGNOSIS — S16.1XXA CERVICAL MYOFASCIAL STRAIN, INITIAL ENCOUNTER: ICD-10-CM

## 2022-08-02 DIAGNOSIS — S46.912A STRAIN OF ACROMIOCLAVICULAR JOINT, LEFT, INITIAL ENCOUNTER: ICD-10-CM

## 2022-08-02 PROCEDURE — 73030 X-RAY EXAM OF SHOULDER: CPT

## 2022-08-02 PROCEDURE — 99284 EMERGENCY DEPT VISIT MOD MDM: CPT

## 2022-08-02 PROCEDURE — 72125 CT NECK SPINE W/O DYE: CPT

## 2022-08-02 PROCEDURE — 71045 X-RAY EXAM CHEST 1 VIEW: CPT

## 2022-08-02 PROCEDURE — 99284 EMERGENCY DEPT VISIT MOD MDM: CPT | Performed by: EMERGENCY MEDICINE

## 2022-08-02 RX ORDER — CYCLOBENZAPRINE HCL 10 MG
10 TABLET ORAL 2 TIMES DAILY PRN
Qty: 14 TABLET | Refills: 0 | Status: SHIPPED | OUTPATIENT
Start: 2022-08-02

## 2022-08-02 RX ORDER — ACETAMINOPHEN 325 MG/1
975 TABLET ORAL ONCE
Status: COMPLETED | OUTPATIENT
Start: 2022-08-02 | End: 2022-08-02

## 2022-08-02 RX ORDER — LIDOCAINE 50 MG/G
1 PATCH TOPICAL ONCE
Status: DISCONTINUED | OUTPATIENT
Start: 2022-08-02 | End: 2022-08-02 | Stop reason: HOSPADM

## 2022-08-02 RX ADMIN — ACETAMINOPHEN 975 MG: 325 TABLET, FILM COATED ORAL at 17:36

## 2022-08-02 RX ADMIN — LIDOCAINE 5% 1 PATCH: 700 PATCH TOPICAL at 17:36

## 2022-08-02 NOTE — DISCHARGE INSTRUCTIONS
You can move your shoulder and neck as much as you can tolerate  Do not take diclofenac with any other NSAID medicines such as ibuprofen, naproxen, Aleve, Motrin, Advil, or Naprosyn  Do not drive or drink alcohol within 8 hours of using cyclobenzaprine  You can see any of the orthopedic surgeons listed below for further evaluation of your left shoulder injury (a possible AC joint injury)
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
## 2023-07-20 ENCOUNTER — HOSPITAL ENCOUNTER (EMERGENCY)
Facility: HOSPITAL | Age: 43
Discharge: HOME | End: 2023-07-20
Attending: EMERGENCY MEDICINE
Payer: COMMERCIAL

## 2023-07-20 ENCOUNTER — APPOINTMENT (EMERGENCY)
Dept: RADIOLOGY | Facility: HOSPITAL | Age: 43
End: 2023-07-20
Payer: COMMERCIAL

## 2023-07-20 ENCOUNTER — APPOINTMENT (EMERGENCY)
Dept: NON INVASIVE DIAGNOSTICS | Facility: HOSPITAL | Age: 43
End: 2023-07-20
Payer: COMMERCIAL

## 2023-07-20 VITALS
OXYGEN SATURATION: 96 % | HEIGHT: 74 IN | SYSTOLIC BLOOD PRESSURE: 127 MMHG | HEART RATE: 69 BPM | RESPIRATION RATE: 18 BRPM | TEMPERATURE: 97.8 F | WEIGHT: 198 LBS | DIASTOLIC BLOOD PRESSURE: 81 MMHG | BODY MASS INDEX: 25.41 KG/M2

## 2023-07-20 DIAGNOSIS — M79.631 RIGHT FOREARM PAIN: Primary | ICD-10-CM

## 2023-07-20 PROCEDURE — 96372 THER/PROPH/DIAG INJ SC/IM: CPT

## 2023-07-20 PROCEDURE — 93971 EXTREMITY STUDY: CPT

## 2023-07-20 PROCEDURE — 99285 EMERGENCY DEPT VISIT HI MDM: CPT | Performed by: PHYSICIAN ASSISTANT

## 2023-07-20 PROCEDURE — 73090 X-RAY EXAM OF FOREARM: CPT

## 2023-07-20 PROCEDURE — 99284 EMERGENCY DEPT VISIT MOD MDM: CPT

## 2023-07-20 PROCEDURE — 93971 EXTREMITY STUDY: CPT | Performed by: SURGERY

## 2023-07-20 RX ORDER — KETOROLAC TROMETHAMINE 30 MG/ML
15 INJECTION, SOLUTION INTRAMUSCULAR; INTRAVENOUS ONCE
Status: COMPLETED | OUTPATIENT
Start: 2023-07-20 | End: 2023-07-20

## 2023-07-20 RX ORDER — DICLOFENAC SODIUM 75 MG/1
75 TABLET, DELAYED RELEASE ORAL 2 TIMES DAILY PRN
Qty: 6 TABLET | Refills: 0 | Status: SHIPPED | OUTPATIENT
Start: 2023-07-20

## 2023-07-20 RX ORDER — LIDOCAINE 50 MG/G
1 PATCH TOPICAL DAILY
Qty: 5 PATCH | Refills: 0 | Status: SHIPPED | OUTPATIENT
Start: 2023-07-20

## 2023-07-20 RX ADMIN — KETOROLAC TROMETHAMINE 15 MG: 30 INJECTION, SOLUTION INTRAMUSCULAR; INTRAVENOUS at 18:43

## 2023-07-20 NOTE — ED PROVIDER NOTES
History  Chief Complaint   Patient presents with   • Arm Pain     Patient reports waking up Sunday morning with right arm pain. Patient reports that it has gotten worse since then and reports swelling and increased pain. Patient denies injury     Is a 82-year-old male patient who states woke up 5 days ago with mid forearm pain. States he recently woke up and felt like his arm fell asleep developed pain in his mid forearm. He describes it as sharp it hurts to move but is able. He states it was very swollen that has improved. Tried nothing over-the-counter. No numbness or tingling. He is right-hand dominant. No known injury. No IV drug use. Never had this before. Differential diagnosis includes not limited to DVT, muscle strain, subclavian steal syndrome, arterial thrombus less likely, "carpal tunnel syndrome, radiculopathy          Prior to Admission Medications   Prescriptions Last Dose Informant Patient Reported? Taking? cyclobenzaprine (FLEXERIL) 10 mg tablet   No No   Sig: Take 1 tablet (10 mg total) by mouth 2 (two) times a day as needed for muscle spasms Do not drive or drink alcohol while using   diclofenac sodium (VOLTAREN) 50 mg EC tablet   No No   Sig: Take 1 tablet (50 mg total) by mouth 2 (two) times a day as needed (neck/shoulder pain) Do not take with any other NSAID medicines such as ibuprofen, naproxen, Aleve, Motrin, Advil, or Naprosyn. Facility-Administered Medications Last Administration Doses Remaining   aspirin chewable tablet 324 mg 10/1/2019 10:00 AM           Past Medical History:   Diagnosis Date   • Sepsis [facility])        Past Surgical History:   Procedure Laterality Date   • CYST REMOVAL      from tailbone   • NO PAST SURGERIES         Family History   Problem Relation Age of Onset   • Hypertension Father    • Heart disease Father      I have reviewed and agree with the history as documented.     E-Cigarette/Vaping   • E-Cigarette Use Never User      E-Cigarette/Vaping Substances   • Nicotine Yes    • THC No    • CBD No    • Flavoring No    • Other No    • Unknown No      Social History     Tobacco Use   • Smoking status: Every Day     Packs/day: 1.00     Types: Cigarettes   • Smokeless tobacco: Never   Vaping Use   • Vaping Use: Never used   Substance Use Topics   • Alcohol use: Not Currently     Comment: socially   • Drug use: No       Review of Systems   Constitutional: Negative for chills, diaphoresis, fatigue and fever. HENT: Negative for congestion, ear pain, nosebleeds and sore throat. Eyes: Negative for photophobia, pain, discharge and visual disturbance. Respiratory: Negative for cough, choking, chest tightness, shortness of breath and wheezing. Cardiovascular: Negative for chest pain and palpitations. Gastrointestinal: Negative for abdominal distention, abdominal pain, diarrhea and vomiting. Genitourinary: Negative for dysuria, flank pain, frequency and hematuria. Musculoskeletal: Negative for arthralgias, back pain, gait problem and joint swelling. Right forearm pain   Skin: Negative for color change and rash. Neurological: Negative for dizziness, seizures, syncope and headaches. Psychiatric/Behavioral: Negative for behavioral problems and confusion. The patient is not nervous/anxious. All other systems reviewed and are negative. Physical Exam  Physical Exam  Vitals and nursing note reviewed. Constitutional:       General: He is not in acute distress. Appearance: Normal appearance. He is well-developed. He is not ill-appearing, toxic-appearing or diaphoretic. HENT:      Head: Normocephalic and atraumatic. Right Ear: Tympanic membrane, ear canal and external ear normal.      Left Ear: Tympanic membrane, ear canal and external ear normal.      Nose: Nose normal.      Mouth/Throat:      Mouth: Mucous membranes are moist.      Pharynx: Oropharynx is clear. No oropharyngeal exudate or posterior oropharyngeal erythema.    Eyes: General: No scleral icterus. Right eye: No discharge. Left eye: No discharge. Conjunctiva/sclera: Conjunctivae normal.      Pupils: Pupils are equal, round, and reactive to light. Cardiovascular:      Rate and Rhythm: Normal rate and regular rhythm. Pulmonary:      Effort: Pulmonary effort is normal.      Breath sounds: Normal breath sounds. Abdominal:      General: Bowel sounds are normal.      Palpations: Abdomen is soft. Tenderness: There is no abdominal tenderness. Musculoskeletal:         General: Normal range of motion. Arms:       Cervical back: Normal range of motion and neck supple. Right lower leg: No edema. Left lower leg: No edema. Skin:     General: Skin is warm. Capillary Refill: Capillary refill takes less than 2 seconds. Neurological:      General: No focal deficit present. Mental Status: He is alert and oriented to person, place, and time. Mental status is at baseline.    Psychiatric:         Mood and Affect: Mood normal.         Behavior: Behavior normal.         Vital Signs  ED Triage Vitals [07/20/23 1753]   Temperature Pulse Respirations Blood Pressure SpO2   97.8 °F (36.6 °C) 69 18 127/81 96 %      Temp Source Heart Rate Source Patient Position - Orthostatic VS BP Location FiO2 (%)   Temporal Monitor Lying Left arm --      Pain Score       9           Vitals:    07/20/23 1753   BP: 127/81   Pulse: 69   Patient Position - Orthostatic VS: Lying         Visual Acuity      ED Medications  Medications   ketorolac (TORADOL) injection 15 mg (15 mg Intramuscular Given 7/20/23 1843)       Diagnostic Studies  Results Reviewed     None                 XR forearm 2 views RIGHT   ED Interpretation by Skye Guerrero PA-C (07/20 1851)   No acute finding        VAS upper limb venous duplex scan, unilateral/limited    (Results Pending)              Procedures  Procedures         ED Course  ED Course as of 07/20/23 1913   Thu Jul 20, 2023 [id number] with vascular tech no acute DVT seen                               SBIRT 22yo+    Flowsheet Row Most Recent Value   Initial Alcohol Screen: US AUDIT-C     1. How often do you have a drink containing alcohol? 2 Filed at: 07/20/2023 1751   2. How many drinks containing alcohol do you have on a typical day you are drinking? 2 Filed at: 07/20/2023 1751   3a. Male UNDER 65: How often do you have five or more drinks on one occasion? 0 Filed at: 07/20/2023 1751   Audit-C Score 4 Filed at: 07/20/2023 1751   DARA: How many times in the past year have you. .. Used an illegal drug or used a prescription medication for non-medical reasons? Never Filed at: 07/20/2023 1751                    Medical Decision Making  73-year-old male patient who comes in with pain mid forearm without trauma. He is right-hand dominant this is his right arm. States he woke up on Sunday with this. He has no numbness or paresthesia at this time. No sign of infection no fever or chills is taking nothing over-the-counter has full range of motion sensation and strength. No sign of compartment syndrome. No sign of clavian steal syndrome or nerve root impingement. No vascular compromise. Differential diagnosis includes not limited to muscle strain, DVT, subclavian steal syndrome, less likely, compartment syndrome unlikely vascular compromise unlikely strong pulses no deficits. Right forearm pain: acute illness or injury     Details: Mid forearm pain over the muscle belly no DVT as per vascular tech full range of motion neurovascularly intact negative Spurling's. Strong radial normal pulse less than 2-second cap refill. Amount and/or Complexity of Data Reviewed  Radiology: ordered and independent interpretation performed. Decision-making details documented in ED Course.      Details: I personally interpreted the x-ray found no acute findings    Spoke with the vascular tech who stated no DVT, no collection, no inflammation  Discussion of management or test interpretation with external provider(s): Using joint decision-making patient agreed to be discharged follow-up with orthopedics you placed on diclofenac and lidocaine patch. Return with any worsening symptoms questions comments or concerns verbalized understanding    Risk  Prescription drug management. Disposition  Final diagnoses:   Right forearm pain     Time reflects when diagnosis was documented in both MDM as applicable and the Disposition within this note     Time User Action Codes Description Comment    7/20/2023  6:52 PM Jesika Nunn Add [D98.187] Right forearm pain       ED Disposition     ED Disposition   Discharge    Condition   Stable    Date/Time   Thu Jul 20, 2023  7:13 PM    Comment   [facility] discharge to home/self care. Follow-up Information     Follow up With Specialties Details Why Contact Info Additional [address] Specialists [facility] Schedule an appointment as soon as possible for a visit   [address] [facility] [phone]  [facility] [address] [place] [address], [place], [facility] Jamaal Paula          Patient's Medications   Discharge Prescriptions    DICLOFENAC (VOLTAREN) 75 MG EC TABLET    Take 1 tablet (75 mg total) by mouth 2 (two) times a day as needed (pain with food)       Start Date: 7/20/2023 End Date: --       Order Dose: 75 mg       Quantity: 6 tablet    Refills: 0    LIDOCAINE (LIDODERM) 5 %    Apply 1 patch topically over 12 hours daily Remove & Discard patch within 12 hours or as directed by MD       Start Date: 7/20/2023 End Date: --       Order Dose: 1 patch       Quantity: 5 patch    Refills: 0       No discharge procedures on file.     PDMP Review     None          ED Provider  Electronically Signed by           Apurva Pack PA-C  07/20/23 [address] [place] [address], LINDEN  07/20/23 1913

## 2023-07-21 ENCOUNTER — HOSPITAL ENCOUNTER (EMERGENCY)
Facility: HOSPITAL | Age: 43
Discharge: HOME | End: 2023-07-22
Attending: EMERGENCY MEDICINE
Payer: COMMERCIAL

## 2023-07-21 ENCOUNTER — APPOINTMENT (EMERGENCY)
Dept: RADIOLOGY | Facility: HOSPITAL | Age: 43
End: 2023-07-21
Payer: COMMERCIAL

## 2023-07-21 VITALS
TEMPERATURE: 97.2 F | SYSTOLIC BLOOD PRESSURE: 140 MMHG | WEIGHT: 198 LBS | HEIGHT: 74 IN | BODY MASS INDEX: 25.41 KG/M2 | RESPIRATION RATE: 18 BRPM | HEART RATE: 65 BPM | DIASTOLIC BLOOD PRESSURE: 100 MMHG | OXYGEN SATURATION: 97 %

## 2023-07-21 DIAGNOSIS — M79.601 RIGHT ARM PAIN: Primary | ICD-10-CM

## 2023-07-21 DIAGNOSIS — R07.9 RIGHT-SIDED CHEST PAIN: ICD-10-CM

## 2023-07-21 LAB
ALBUMIN SERPL BCP-MCNC: 4.3 G/DL (ref 3.5–5)
ALP SERPL-CCNC: 64 U/L (ref 34–104)
ALT SERPL W P-5'-P-CCNC: 12 U/L (ref 7–52)
ANION GAP SERPL CALCULATED.3IONS-SCNC: 10 MMOL/L
AST SERPL W P-5'-P-CCNC: 19 U/L (ref 13–39)
BASOPHILS # BLD AUTO: 0.05 THOUSANDS/ÂΜL (ref 0–0.1)
BASOPHILS NFR BLD AUTO: 1 % (ref 0–1)
BILIRUB SERPL-MCNC: 0.44 MG/DL (ref 0.2–1)
BUN SERPL-MCNC: 12 MG/DL (ref 5–25)
CALCIUM SERPL-MCNC: 9.4 MG/DL (ref 8.4–10.2)
CARDIAC TROPONIN I PNL SERPL HS: 5 NG/L
CHLORIDE SERPL-SCNC: 105 MMOL/L (ref 96–108)
CK SERPL-CCNC: 104 U/L (ref 39–308)
CO2 SERPL-SCNC: 22 MMOL/L (ref 21–32)
CREAT SERPL-MCNC: 0.91 MG/DL (ref 0.6–1.3)
EOSINOPHIL # BLD AUTO: 0.17 THOUSAND/ÂΜL (ref 0–0.61)
EOSINOPHIL NFR BLD AUTO: 2 % (ref 0–6)
ERYTHROCYTE [DISTWIDTH] IN BLOOD BY AUTOMATED COUNT: 11.9 % (ref 11.6–15.1)
GFR SERPL CREATININE-BSD FRML MDRD: 104 ML/MIN/1.73SQ M
GLUCOSE SERPL-MCNC: 100 MG/DL (ref 65–140)
HCT VFR BLD AUTO: 41.6 % (ref 36.5–49.3)
HGB BLD-MCNC: 14.5 G/DL (ref 12–17)
IMM GRANULOCYTES # BLD AUTO: 0.02 THOUSAND/UL (ref 0–0.2)
IMM GRANULOCYTES NFR BLD AUTO: 0 % (ref 0–2)
LYMPHOCYTES # BLD AUTO: 3.19 THOUSANDS/ÂΜL (ref 0.6–4.47)
LYMPHOCYTES NFR BLD AUTO: 39 % (ref 14–44)
MCH RBC QN AUTO: 31 PG (ref 26.8–34.3)
MCHC RBC AUTO-ENTMCNC: 34.9 G/DL (ref 31.4–37.4)
MCV RBC AUTO: 89 FL (ref 82–98)
MONOCYTES # BLD AUTO: 0.68 THOUSAND/ÂΜL (ref 0.17–1.22)
MONOCYTES NFR BLD AUTO: 8 % (ref 4–12)
NEUTROPHILS # BLD AUTO: 4.15 THOUSANDS/ÂΜL (ref 1.85–7.62)
NEUTS SEG NFR BLD AUTO: 50 % (ref 43–75)
NRBC BLD AUTO-RTO: 0 /100 WBCS
PLATELET # BLD AUTO: 222 THOUSANDS/UL (ref 149–390)
PMV BLD AUTO: 10.4 FL (ref 8.9–12.7)
POTASSIUM SERPL-SCNC: 3.8 MMOL/L (ref 3.5–5.3)
PROT SERPL-MCNC: 6.9 G/DL (ref 6.4–8.4)
RBC # BLD AUTO: 4.67 MILLION/UL (ref 3.88–5.62)
SODIUM SERPL-SCNC: 137 MMOL/L (ref 135–147)
WBC # BLD AUTO: 8.26 THOUSAND/UL (ref 4.31–10.16)

## 2023-07-21 PROCEDURE — 93005 ELECTROCARDIOGRAM TRACING: CPT

## 2023-07-21 PROCEDURE — 71045 X-RAY EXAM CHEST 1 VIEW: CPT

## 2023-07-21 PROCEDURE — 82550 ASSAY OF CK (CPK): CPT | Performed by: EMERGENCY MEDICINE

## 2023-07-21 PROCEDURE — 85025 COMPLETE CBC W/AUTO DIFF WBC: CPT | Performed by: EMERGENCY MEDICINE

## 2023-07-21 PROCEDURE — 36415 COLL VENOUS BLD VENIPUNCTURE: CPT | Performed by: EMERGENCY MEDICINE

## 2023-07-21 PROCEDURE — 80053 COMPREHEN METABOLIC PANEL: CPT | Performed by: EMERGENCY MEDICINE

## 2023-07-21 PROCEDURE — 84484 ASSAY OF TROPONIN QUANT: CPT | Performed by: EMERGENCY MEDICINE

## 2023-07-21 RX ORDER — IBUPROFEN 400 MG/1
400 TABLET ORAL EVERY 6 HOURS PRN
Qty: 30 TABLET | Refills: 0 | Status: SHIPPED | OUTPATIENT
Start: 2023-07-21

## 2023-07-21 RX ORDER — KETOROLAC TROMETHAMINE 30 MG/ML
15 INJECTION, SOLUTION INTRAMUSCULAR; INTRAVENOUS ONCE
Status: COMPLETED | OUTPATIENT
Start: 2023-07-21 | End: 2023-07-21

## 2023-07-21 RX ADMIN — KETOROLAC TROMETHAMINE 15 MG: 30 INJECTION, SOLUTION INTRAMUSCULAR at 22:23

## 2023-07-22 NOTE — DISCHARGE INSTRUCTIONS
Please follow all return precautions. Because you had such a strong response to ibuprofen, I recommend you take ibuprofen 400 mg every 6 hours as needed. I wrote a script for this as well. Follow up with orthopedics if pain continues. Thank you.

## 2023-07-22 NOTE — ED PROVIDER NOTES
History  Chief Complaint   Patient presents with   • Arm Pain     Patient seen here yesterday for same. Patient reports unbearable right arm pain. 43-year-old male who presents for severe right arm pain. Patient was seen yesterday, and had a negative duplex, forearm x-ray performed. Similar to prior history, patient denies any inciting event, stating that the pain started randomly several days ago. Patient states that the pain continues. He describes that it seems to originate in the right elbow, radiates on the ulnar aspect of the arm to the fifth digit. He also describes now that is radiating to his right chest.  He states that it hurts when he takes of breath. He denies shortness of breath. No nausea or vomiting. He says that he does experience diaphoresis however this is due to his pain. He has been trying the medications he was discharged with with no relief. No numbness, weakness, tingling. ROS otherwise negative. Prior to Admission Medications   Prescriptions Last Dose Informant Patient Reported? Taking?    cyclobenzaprine (FLEXERIL) 10 mg tablet   No No   Sig: Take 1 tablet (10 mg total) by mouth 2 (two) times a day as needed for muscle spasms Do not drive or drink alcohol while using   diclofenac (VOLTAREN) 75 mg EC tablet   No No   Sig: Take 1 tablet (75 mg total) by mouth 2 (two) times a day as needed (pain with food)   lidocaine (Lidoderm) 5 %   No No   Sig: Apply 1 patch topically over 12 hours daily Remove & Discard patch within 12 hours or as directed by MD      Facility-Administered Medications Last Administration Doses Remaining   aspirin chewable tablet 324 mg 10/1/2019 10:00 AM           Past Medical History:   Diagnosis Date   • Sepsis ([address])        Past Surgical History:   Procedure Laterality Date   • CYST REMOVAL      from tailbone   • NO PAST SURGERIES         Family History   Problem Relation Age of Onset   • Hypertension Father    • Heart disease Father      I have reviewed and agree with the history as documented. E-Cigarette/Vaping   • E-Cigarette Use Never User      E-Cigarette/Vaping Substances   • Nicotine Yes    • THC No    • CBD No    • Flavoring No    • Other No    • Unknown No      Social History     Tobacco Use   • Smoking status: Every Day     Packs/day: 1.00     Types: Cigarettes   • Smokeless tobacco: Never   Vaping Use   • Vaping Use: Never used   Substance Use Topics   • Alcohol use: Not Currently     Comment: socially   • Drug use: No       Review of Systems   Constitutional: Negative for chills, diaphoresis, fatigue and fever. HENT: Negative for congestion and sore throat. Eyes: Negative for visual disturbance. Respiratory: Negative for cough, chest tightness and shortness of breath. Cardiovascular: Negative for chest pain, palpitations and leg swelling. Gastrointestinal: Negative for abdominal distention, abdominal pain, constipation, diarrhea, nausea and vomiting. Genitourinary: Negative for difficulty urinating and dysuria. Musculoskeletal: Positive for arthralgias. Negative for myalgias. Skin: Negative for rash. Neurological: Negative for dizziness, weakness, light-headedness, numbness and headaches. Psychiatric/Behavioral: Negative for agitation, behavioral problems and confusion. The patient is not nervous/anxious. All other systems reviewed and are negative. Physical Exam  Physical Exam  Constitutional:       Appearance: He is well-developed. HENT:      Head: Normocephalic and atraumatic. Cardiovascular:      Rate and Rhythm: Normal rate and regular rhythm. Heart sounds: Normal heart sounds. No murmur heard. Pulmonary:      Effort: Pulmonary effort is normal. No respiratory distress. Breath sounds: Normal breath sounds. Abdominal:      General: Bowel sounds are normal. There is no distension. Palpations: Abdomen is soft. Tenderness: There is no abdominal tenderness.    Musculoskeletal: General: Tenderness present. No swelling or deformity. Comments: Patient has tenderness throughout the ulnar aspect of the forearm. He has no tenderness in the upper arm or the chest.  No shoulder tenderness, no neck tenderness. He has 5 out of 5 strength throughout the wrist, can flex and extend the wrist, normal okay sign, normal  strength, thumbs up, finger abduction. Skin:     General: Skin is warm. Findings: No rash. Neurological:      Mental Status: He is alert and oriented to person, place, and time. Psychiatric:         Behavior: Behavior normal.         Thought Content:  Thought content normal.         Judgment: Judgment normal.         Vital Signs  ED Triage Vitals [07/21/23 2143]   Temperature Pulse Respirations Blood Pressure SpO2   (!) 97.2 °F (36.2 °C) 65 18 140/100 97 %      Temp Source Heart Rate Source Patient Position - Orthostatic VS BP Location FiO2 (%)   Temporal Monitor Lying Left arm --      Pain Score       10 - Worst Possible Pain           Vitals:    07/21/23 2143   BP: 140/100   Pulse: 65   Patient Position - Orthostatic VS: Lying         Visual Acuity      ED Medications  Medications   ketorolac (TORADOL) injection 15 mg (15 mg Intravenous Given 7/21/23 2223)       Diagnostic Studies  Results Reviewed     Procedure Component Value Units Date/Time    HS Troponin 0hr (reflex protocol) [[id number]]  (Normal) Collected: 07/21/23 2228    Lab Status: Final result Specimen: Blood from Arm, Left Updated: 07/21/23 2301     hs TnI 0hr 5 ng/L     Comprehensive metabolic panel [[id number]] Collected: 07/21/23 2228    Lab Status: Final result Specimen: Blood from Arm, Left Updated: 07/21/23 2257     Sodium 137 mmol/L      Potassium 3.8 mmol/L      Chloride 105 mmol/L      CO2 22 mmol/L      ANION GAP 10 mmol/L      BUN 12 mg/dL      Creatinine 0.91 mg/dL      Glucose 100 mg/dL      Calcium 9.4 mg/dL      AST 19 U/L      ALT 12 U/L      Alkaline Phosphatase 64 U/L      Total Protein 6.9 g/dL      Albumin 4.3 g/dL      Total Bilirubin 0.44 mg/dL      eGFR 104 ml/min/1.73sq m     Narrative:      [place] guidelines for Chronic Kidney Disease (CKD):   •  Stage 1 with normal or high GFR (GFR > 90 mL/min/1.73 square meters)  •  Stage 2 Mild CKD (GFR = 60-89 mL/min/1.73 square meters)  •  Stage 3A Moderate CKD (GFR = 45-59 mL/min/1.73 square meters)  •  Stage 3B Moderate CKD (GFR = 30-44 mL/min/1.73 square meters)  •  Stage 4 Severe CKD (GFR = 15-29 mL/min/1.73 square meters)  •  Stage 5 End Stage CKD (GFR <15 mL/min/1.73 square meters)  Note: GFR calculation is accurate only with a steady state creatinine    CK [[id number]]  (Normal) Collected: 07/21/23 2228    Lab Status: Final result Specimen: Blood from Arm, Left Updated: 07/21/23 2257     Total  U/L     CBC and differential [[id number]] Collected: 07/21/23 2228    Lab Status: Final result Specimen: Blood from Arm, Left Updated: 07/21/23 2239     WBC 8.26 Thousand/uL      RBC 4.67 Million/uL      Hemoglobin 14.5 g/dL      Hematocrit 41.6 %      MCV 89 fL      MCH 31.0 pg      MCHC 34.9 g/dL      RDW 11.9 %      MPV 10.4 fL      Platelets 244 Thousands/uL      nRBC 0 /100 WBCs      Neutrophils Relative 50 %      Immat GRANS % 0 %      Lymphocytes Relative 39 %      Monocytes Relative 8 %      Eosinophils Relative 2 %      Basophils Relative 1 %      Neutrophils Absolute 4.15 Thousands/µL      Immature Grans Absolute 0.02 Thousand/uL      Lymphocytes Absolute 3.19 Thousands/µL      Monocytes Absolute 0.68 Thousand/µL      Eosinophils Absolute 0.17 Thousand/µL      Basophils Absolute 0.05 Thousands/µL                  XR chest portable   ED Interpretation by Misael Oleary MD (07/21 0551)   No acute cardiopulmonary disease.                  Procedures  ECG 12 Lead Documentation Only    Date/Time: 7/22/2023 12:59 AM    Performed by: Misael Oleary MD  Authorized by: Misael Oleary MD    Indications / Diagnosis:  Right chest pain  ECG reviewed by me, the ED Provider: yes    Patient location:  ED  Previous ECG:     Previous ECG:  Compared to current    Similarity:  No change  Interpretation:     Interpretation: normal    Rate:     ECG rate:  64    ECG rate assessment: normal    Ectopy:     Ectopy: none    QRS:     QRS axis:  Normal    QRS intervals:  Normal  Conduction:     Conduction: normal    ST segments:     ST segments:  Normal  T waves:     T waves: normal               ED Course  ED Course as of 07/22/23 0100   Fri Jul 21, 2023   2303 Total CK: 104  Inconsistent with compartment syndrome or rhabdomyolysis   2304 hs TnI 0hr: 5  Inconsistent with ACS             HEART Risk Score    Flowsheet Row Most Recent Value   Heart Score Risk Calculator    History 0 Filed at: 07/21/2023 2346   ECG 0 Filed at: 07/21/2023 2346   Age 0 Filed at: 07/21/2023 2346   Risk Factors 0 Filed at: 07/21/2023 2346   Troponin 0 Filed at: 07/21/2023 2346   HEART Score 0 Filed at: 07/21/2023 2346                        SBIRT 22yo+    Flowsheet Row Most Recent Value   Initial Alcohol Screen: US AUDIT-C     1. How often do you have a drink containing alcohol? 2 Filed at: 07/21/2023 2142   2. How many drinks containing alcohol do you have on a typical day you are drinking? 1 Filed at: 07/21/2023 2142   3a. Male UNDER 65: How often do you have five or more drinks on one occasion? 0 Filed at: 07/21/2023 2142   Audit-C Score 3 Filed at: 07/21/2023 2142   DARA: How many times in the past year have you. .. Used an illegal drug or used a prescription medication for non-medical reasons? Never Filed at: 07/21/2023 2142                    Medical Decision Making  I reviewed the patient's medical chart, PMHx, prior encounters, medications.     My independent interpretation of ECG demonstrated: NSR, no acute ischemic changes  My independent interpretation of CXR demonstrated:    My DDx includes: Compartment syndrome, cubital tunnel syndrome, ACS    Unsure of patient's etiology of symptoms, however given that is rating to the right chest, will perform cardiac work-up. This may possibly be due to cubital tunnel syndrome, will also check CK. Doubt DVT at this time given negative duplex. Negative cardiac workup, delta trop not necessary given timeline. Patient felt much better after toradol. Will recommend motrin at home, script written. Discharged with strict return precautions, orthopedist follow up. Amount and/or Complexity of Data Reviewed  Labs: ordered. Decision-making details documented in ED Course. Radiology: ordered and independent interpretation performed. Risk  Prescription drug management. Disposition  Final diagnoses:   Right arm pain   Right-sided chest pain     Time reflects when diagnosis was documented in both MDM as applicable and the Disposition within this note     Time User Action Codes Description Comment    7/21/2023 11:44 PM Emilia Toure Add [G79.830] Right arm pain     7/21/2023 11:45  [address] [R07.9] Right-sided chest pain       ED Disposition     ED Disposition   Discharge    Condition   Stable    Date/Time   Fri Jul 21, 2023 11:44 PM    Comment   Jayjay Gaxiola discharge to home/self care.                Follow-up Information     Follow up With Specialties Details Why Contact Info Additional Information    [facility] Call  For re-evaluation [address] [id number] [phone] [facility], [address] [place] [address], [address] [place] [address]  Use Entrance A           Discharge Medication List as of 7/21/2023 11:46 PM      START taking these medications    Details   ibuprofen (MOTRIN) 400 mg tablet Take 1 tablet (400 mg total) by mouth every 6 (six) hours as needed for mild pain, Starting Fri 7/21/2023, Normal CONTINUE these medications which have NOT CHANGED    Details   cyclobenzaprine (FLEXERIL) 10 mg tablet Take 1 tablet (10 mg total) by mouth 2 (two) times a day as needed for muscle spasms Do not drive or drink alcohol while using, Starting Tue 8/2/2022, Normal      diclofenac (VOLTAREN) 75 mg EC tablet Take 1 tablet (75 mg total) by mouth 2 (two) times a day as needed (pain with food), Starting Thu 7/20/2023, Normal      lidocaine (Lidoderm) 5 % Apply 1 patch topically over 12 hours daily Remove & Discard patch within 12 hours or as directed by MD, Starting Thu 7/20/2023, Normal             No discharge procedures on file.     PDMP Review     None          ED Provider  Electronically Signed by           Damaso Muro MD  07/22/23 0104

## 2023-07-24 LAB
ATRIAL RATE: 64 BPM
P AXIS: 68 DEGREES
PR INTERVAL: 174 MS
QRS AXIS: 62 DEGREES
QRSD INTERVAL: 96 MS
QT INTERVAL: 416 MS
QTC INTERVAL: 429 MS
T WAVE AXIS: 61 DEGREES
VENTRICULAR RATE: 64 BPM

## 2023-07-24 PROCEDURE — 93010 ELECTROCARDIOGRAM REPORT: CPT | Performed by: INTERNAL MEDICINE

## 2023-11-02 ENCOUNTER — OFFICE VISIT (OUTPATIENT)
Dept: CARDIOLOGY CLINIC | Facility: CLINIC | Age: 43
End: 2023-11-02
Payer: COMMERCIAL

## 2023-11-02 ENCOUNTER — CLINICAL SUPPORT (OUTPATIENT)
Dept: CARDIOLOGY CLINIC | Facility: CLINIC | Age: 43
End: 2023-11-02
Payer: COMMERCIAL

## 2023-11-02 VITALS
HEART RATE: 59 BPM | DIASTOLIC BLOOD PRESSURE: 92 MMHG | HEIGHT: 74 IN | BODY MASS INDEX: 25.67 KG/M2 | WEIGHT: 200 LBS | SYSTOLIC BLOOD PRESSURE: 126 MMHG

## 2023-11-02 DIAGNOSIS — Z82.49 FAMILY HISTORY OF HEART DISEASE: ICD-10-CM

## 2023-11-02 DIAGNOSIS — R07.9 CHEST PAIN, UNSPECIFIED TYPE: Primary | ICD-10-CM

## 2023-11-02 DIAGNOSIS — R00.2 PALPITATIONS: ICD-10-CM

## 2023-11-02 DIAGNOSIS — R53.83 OTHER FATIGUE: ICD-10-CM

## 2023-11-02 DIAGNOSIS — R00.2 PALPITATIONS: Primary | ICD-10-CM

## 2023-11-02 PROCEDURE — 93246 EXT ECG>7D<15D RECORDING: CPT | Performed by: INTERNAL MEDICINE

## 2023-11-02 PROCEDURE — 99204 OFFICE O/P NEW MOD 45 MIN: CPT | Performed by: PHYSICIAN ASSISTANT

## 2023-11-02 PROCEDURE — 93000 ELECTROCARDIOGRAM COMPLETE: CPT | Performed by: PHYSICIAN ASSISTANT

## 2023-11-02 NOTE — ASSESSMENT & PLAN NOTE
Sensations of skipped heart beat and pounding heart beat that "takes his breath away." This Lasts seconds in duration. But at times makes him feel lightheaded. 2 week monitor will be obtained to assess for concerning arrhythmia  CBC and CMP are normal   Mag level and TSH will be checked.

## 2023-11-02 NOTE — ASSESSMENT & PLAN NOTE
Vague laterally located Chest pressure that occurs mostly when he is working under his car with his arm extending in an upward position. This sometimes radiates into both arms. He has some SOB and lightheadedness associated. This also sometimes occurs while at rest. Not aggravated with exertion. Exercise stress echo will be obtained to be sure there is no ischemia present.

## 2023-11-02 NOTE — PATIENT INSTRUCTIONS
Stress test     2 week monitor     Blood work     Return in one month       Call in the meantime if there are any concerns

## 2023-11-02 NOTE — PROGRESS NOTES
[facility]   Outpatient Note  Huseyin Brown  1980  [id number]  [facility] [place] [id number] [phone]    Huseyin Brown is a 41 y.o. male    Assessment and Plan:   Family history of heart disease  Father had MI at the age of 39   Lipid panel will be checked     Chest pain  Vague laterally located Chest pressure that occurs mostly when he is working under his car with his arm extending in an upward position. This sometimes radiates into both arms. He has some SOB and lightheadedness associated. This also sometimes occurs while at rest. Not aggravated with exertion. Exercise stress echo will be obtained to be sure there is no ischemia present. Palpitations  Sensations of skipped heart beat and pounding heart beat that "takes his breath away." This Lasts seconds in duration. But at times makes him feel lightheaded. 2 week monitor will be obtained to assess for concerning arrhythmia  CBC and CMP are normal   Mag level and TSH will be checked. Other fatigue  Generalized fatigue  Feeling like he needs to sleep all the time. Will check a TSH and Mag level   CBC and Chemistries normal.       Additional Plan:   No Medications changes made today. Pertinent testing orders as outlined. Available lab and test results are reviewed with the patient and any additional required labs are ordered as noted. Return visit will be in one month or earlier if there are problems. The patient is encouraged to call in the meantime if there are questions or concerns. Subjective: The patient is seen in the office today as a new patient with complaints of chest discomfort and palpitations. He states that he experiences chest discomfort at various time in the day and not always while exerting.  He describes vague laterally located Chest pressure that occurs mostly when he is working under his car with his arm extending in an upward position. This sometimes radiates into both arms. He has some SOB and lightheadedness associated. This also sometimes occurs while at rest. Not aggravated with exertion. He is concerned because he has family hx of early CAD. His father  of MI at the age of 39. Unrelated he also has a sensation of palpitations that at times "takes his breath away." This usually last seconds in duration and can make him lightheaded at times. These sensations occur on a daily basis. He also reports being fatigued all the time--to the point of feeling like he wants to sleep. This has been happening for months. Labs are reviewed and CBC and chemistries are normal. He had a TSH about one year ago that was normal.     He smokes 1 PPD of cigarettes and drinks alcohol socially. He does not use illicit drugs.            Social History  Social History     Tobacco Use   Smoking Status Every Day    Packs/day: 1.00    Types: Cigarettes   Smokeless Tobacco Never   ,   Social History     Substance and Sexual Activity   Alcohol Use Not Currently    Comment: socially   ,   Social History     Substance and Sexual Activity   Drug Use No     Family History   Problem Relation Age of Onset    Hypertension Father     Heart disease Father        Medical and Surgical History  Past Medical History:   Diagnosis Date    Sepsis ([address])      Past Surgical History:   Procedure Laterality Date    CYST REMOVAL      from tailbone    NO PAST SURGERIES           Current Outpatient Medications:     cyclobenzaprine (FLEXERIL) 10 mg tablet, Take 1 tablet (10 mg total) by mouth 2 (two) times a day as needed for muscle spasms Do not drive or drink alcohol while using (Patient not taking: Reported on 2023), Disp: 14 tablet, Rfl: 0    diclofenac (VOLTAREN) 75 mg EC tablet, Take 1 tablet (75 mg total) by mouth 2 (two) times a day as needed (pain with food) (Patient not taking: Reported on 2023), Disp: 6 tablet, Rfl: 0    ibuprofen (MOTRIN) 400 mg tablet, Take 1 tablet (400 mg total) by mouth every 6 (six) hours as needed for mild pain (Patient not taking: Reported on 11/2/2023), Disp: 30 tablet, Rfl: 0    lidocaine (Lidoderm) 5 %, Apply 1 patch topically over 12 hours daily Remove & Discard patch within 12 hours or as directed by MD (Patient not taking: Reported on 11/2/2023), Disp: 5 patch, Rfl: 0    Current Facility-Administered Medications:     aspirin chewable tablet 324 mg, 324 mg, Oral, Daily, Kamilla Rivera PA-C, 324 mg at 10/01/19 1000  Allergies   Allergen Reactions    Bee Venom        Review of Systems   Constitutional: Positive for malaise/fatigue. HENT: Negative. Eyes: Negative. Cardiovascular:  Positive for chest pain and palpitations. Negative for claudication, cyanosis, dyspnea on exertion, irregular heartbeat, leg swelling, near-syncope, orthopnea, paroxysmal nocturnal dyspnea and syncope. Respiratory: Negative. Negative for cough, hemoptysis, shortness of breath, sleep disturbances due to breathing, snoring, sputum production and wheezing. Endocrine: Negative. Hematologic/Lymphatic: Negative. Skin: Negative. Musculoskeletal: Negative. Gastrointestinal: Negative. Genitourinary: Negative. Neurological: Negative. Psychiatric/Behavioral: Negative. Allergic/Immunologic: Negative. Objective:   /92   Pulse 59   Ht 6' 2" (1.88 m)   Wt 90.7 kg (200 lb)   BMI 25.68 kg/m²   Physical Exam  Vitals and nursing note reviewed. Constitutional:       Appearance: He is well-developed. HENT:      Head: Normocephalic and atraumatic. Mouth/Throat:      Mouth: Mucous membranes are moist.   Eyes:      General: No scleral icterus. Conjunctiva/sclera: Conjunctivae normal.   Neck:      Thyroid: No thyromegaly. Vascular: No JVD. Cardiovascular:      Rate and Rhythm: Normal rate and regular rhythm.       Heart sounds: Normal heart sounds, S1 normal and S2 normal. No murmur heard. No friction rub. No gallop. Pulmonary:      Effort: No respiratory distress. Breath sounds: No wheezing or rales. Abdominal:      General: Bowel sounds are normal. There is no distension. Palpations: Abdomen is soft. Tenderness: There is no abdominal tenderness. Comments: Aorta not palpable   Musculoskeletal:         General: No tenderness or deformity. Normal range of motion. Cervical back: Normal range of motion and neck supple. Right lower leg: No edema. Left lower leg: No edema. Skin:     General: Skin is warm and dry. Neurological:      General: No focal deficit present. Mental Status: He is alert and oriented to person, place, and time.    Psychiatric:         Mood and Affect: Mood normal.         Behavior: Behavior normal.         Judgment: Judgment normal.         Lab Review:   No results found for: "CHOL"  No results found for: "HDL"  No results found for: "LDLCALC"  No results found for: "TRIG"  Results Reviewed       None          Results Reviewed       None          Results Reviewed       None            Recent Cardiovascular Testing:   АННА and ZIO ordered today     ECG Review:   11/2/23: Normal Sinus rhythm, minimal voltage criteria for LVH may be a normal variant

## 2023-11-02 NOTE — ASSESSMENT & PLAN NOTE
Generalized fatigue  Feeling like he needs to sleep all the time.    Will check a TSH and Mag level   CBC and Chemistries normal.

## 2023-11-13 ENCOUNTER — TELEPHONE (OUTPATIENT)
Dept: CARDIOLOGY CLINIC | Facility: CLINIC | Age: 43
End: 2023-11-13

## 2023-11-13 NOTE — TELEPHONE ENCOUNTER
Patient states the Zio patch fell off on the 9th which is only a week after it was applied. Would like to know if he should come in for a new one to complete the 2 weeks?

## 2023-11-28 ENCOUNTER — HOSPITAL ENCOUNTER (OUTPATIENT)
Dept: NON INVASIVE DIAGNOSTICS | Facility: HOSPITAL | Age: 43
Discharge: HOME | End: 2023-11-28
Payer: COMMERCIAL

## 2023-11-28 VITALS
WEIGHT: 200 LBS | HEIGHT: 74 IN | BODY MASS INDEX: 25.67 KG/M2 | HEART RATE: 69 BPM | SYSTOLIC BLOOD PRESSURE: 120 MMHG | DIASTOLIC BLOOD PRESSURE: 80 MMHG

## 2023-11-28 DIAGNOSIS — R53.83 OTHER FATIGUE: ICD-10-CM

## 2023-11-28 DIAGNOSIS — R07.9 CHEST PAIN, UNSPECIFIED TYPE: ICD-10-CM

## 2023-11-28 LAB
E WAVE DECELERATION TIME: 208 MS
E/A RATIO: 1.13
MAX HR PERCENT: 101 %
MAX HR: 155 BPM
MV PEAK A VEL: 0.55 M/S
MV PEAK E VEL: 62 CM/S
MV STENOSIS PRESSURE HALF TIME: 60 MS
MV VALVE AREA P 1/2 METHOD: 3.67 CM2
RATE PRESSURE PRODUCT: NORMAL
SL CV LV EF: 65
SL CV STRESS RECOVERY BP: NORMAL MMHG
SL CV STRESS RECOVERY HR: 82 BPM
SL CV STRESS RECOVERY O2 SAT: 98 %
SL CV STRESS STAGE REACHED: 5
STRESS BASELINE BP: NORMAL MMHG
STRESS BASELINE HR: 69 BPM
STRESS O2 SAT REST: 99 %
STRESS PEAK HR: 155 BPM
STRESS POST ESTIMATED WORKLOAD: 16.2 METS
STRESS POST EXERCISE DUR MIN: 13 MIN
STRESS POST EXERCISE DUR SEC: 29 SEC
STRESS POST O2 SAT PEAK: 98 %
STRESS POST PEAK BP: 170 MMHG

## 2023-11-28 PROCEDURE — 93350 STRESS TTE ONLY: CPT | Performed by: INTERNAL MEDICINE

## 2023-11-28 PROCEDURE — 93350 STRESS TTE ONLY: CPT

## 2024-01-05 ENCOUNTER — TELEPHONE (OUTPATIENT)
Dept: CARDIOLOGY CLINIC | Facility: CLINIC | Age: 44
End: 2024-01-05

## 2024-01-05 NOTE — TELEPHONE ENCOUNTER
"Spoke with patient regarding \"lost\" Rontch.   He will call back if Ziopatch is found.   He has an appointment on 1/11/24 with Anastasiia.   "

## 2024-02-05 ENCOUNTER — CLINICAL SUPPORT (OUTPATIENT)
Dept: CARDIOLOGY CLINIC | Facility: CLINIC | Age: 44
End: 2024-02-05
Payer: COMMERCIAL

## 2024-02-05 DIAGNOSIS — R00.2 PALPITATIONS: ICD-10-CM

## 2024-02-05 DIAGNOSIS — R53.83 OTHER FATIGUE: ICD-10-CM

## 2024-02-05 DIAGNOSIS — Z82.49 FAMILY HISTORY OF HEART DISEASE: ICD-10-CM

## 2024-02-05 PROCEDURE — 93248 EXT ECG>7D<15D REV&INTERPJ: CPT | Performed by: INTERNAL MEDICINE

## 2024-02-05 NOTE — RESULT ENCOUNTER NOTE
This extended monitor recording was for 7 days.    The basic mechanism was sinus with rates ranging from  beats per minute while in sinus rhythm.  The average heart rate was 73 beats per minute.  Atrial ectopy was frequent common including several short runs.  Ventricular ectopy was frequent comprising 8.5% of all beats.  Some beats classified as ventricular may represent aberrant beats.  The longest ventricular run was 8 beats at an average rate of 100/min.     No pauses were present.  No symptoms were reported.      Gabriele Jaramillo MD

## 2024-02-13 ENCOUNTER — HOSPITAL ENCOUNTER (EMERGENCY)
Facility: HOSPITAL | Age: 44
Discharge: HOME | End: 2024-02-13
Attending: EMERGENCY MEDICINE
Payer: COMMERCIAL

## 2024-02-13 ENCOUNTER — APPOINTMENT (EMERGENCY)
Dept: CT IMAGING | Facility: HOSPITAL | Age: 44
End: 2024-02-13
Payer: COMMERCIAL

## 2024-02-13 VITALS
SYSTOLIC BLOOD PRESSURE: 118 MMHG | OXYGEN SATURATION: 98 % | HEART RATE: 70 BPM | DIASTOLIC BLOOD PRESSURE: 69 MMHG | RESPIRATION RATE: 18 BRPM | TEMPERATURE: 97.2 F

## 2024-02-13 DIAGNOSIS — N39.0 URINARY TRACT INFECTION: Primary | ICD-10-CM

## 2024-02-13 DIAGNOSIS — N30.01 ACUTE CYSTITIS WITH HEMATURIA: ICD-10-CM

## 2024-02-13 DIAGNOSIS — R31.9 HEMATURIA: ICD-10-CM

## 2024-02-13 LAB
ANION GAP SERPL CALCULATED.3IONS-SCNC: 6 MMOL/L
BACTERIA UR QL AUTO: ABNORMAL /HPF
BASOPHILS # BLD AUTO: 0.04 THOUSANDS/ÂΜL (ref 0–0.1)
BASOPHILS NFR BLD AUTO: 0 % (ref 0–1)
BILIRUB UR QL STRIP: NEGATIVE
BUN SERPL-MCNC: 10 MG/DL (ref 5–25)
CALCIUM SERPL-MCNC: 9.3 MG/DL (ref 8.4–10.2)
CHLORIDE SERPL-SCNC: 104 MMOL/L (ref 96–108)
CLARITY UR: ABNORMAL
CO2 SERPL-SCNC: 29 MMOL/L (ref 21–32)
COLOR UR: YELLOW
CREAT SERPL-MCNC: 0.86 MG/DL (ref 0.6–1.3)
EOSINOPHIL # BLD AUTO: 0.14 THOUSAND/ÂΜL (ref 0–0.61)
EOSINOPHIL NFR BLD AUTO: 1 % (ref 0–6)
ERYTHROCYTE [DISTWIDTH] IN BLOOD BY AUTOMATED COUNT: 11.8 % (ref 11.6–15.1)
GFR SERPL CREATININE-BSD FRML MDRD: 107 ML/MIN/1.73SQ M
GLUCOSE SERPL-MCNC: 93 MG/DL (ref 65–140)
GLUCOSE UR STRIP-MCNC: NEGATIVE MG/DL
HCT VFR BLD AUTO: 45.4 % (ref 36.5–49.3)
HGB BLD-MCNC: 15.4 G/DL (ref 12–17)
HGB UR QL STRIP.AUTO: ABNORMAL
IMM GRANULOCYTES # BLD AUTO: 0.03 THOUSAND/UL (ref 0–0.2)
IMM GRANULOCYTES NFR BLD AUTO: 0 % (ref 0–2)
KETONES UR STRIP-MCNC: NEGATIVE MG/DL
LEUKOCYTE ESTERASE UR QL STRIP: ABNORMAL
LYMPHOCYTES # BLD AUTO: 2.03 THOUSANDS/ÂΜL (ref 0.6–4.47)
LYMPHOCYTES NFR BLD AUTO: 17 % (ref 14–44)
MCH RBC QN AUTO: 30.4 PG (ref 26.8–34.3)
MCHC RBC AUTO-ENTMCNC: 33.9 G/DL (ref 31.4–37.4)
MCV RBC AUTO: 90 FL (ref 82–98)
MONOCYTES # BLD AUTO: 0.84 THOUSAND/ÂΜL (ref 0.17–1.22)
MONOCYTES NFR BLD AUTO: 7 % (ref 4–12)
NEUTROPHILS # BLD AUTO: 8.81 THOUSANDS/ÂΜL (ref 1.85–7.62)
NEUTS SEG NFR BLD AUTO: 75 % (ref 43–75)
NITRITE UR QL STRIP: NEGATIVE
NON-SQ EPI CELLS URNS QL MICRO: ABNORMAL /HPF
NRBC BLD AUTO-RTO: 0 /100 WBCS
PH UR STRIP.AUTO: 6 [PH]
PLATELET # BLD AUTO: 221 THOUSANDS/UL (ref 149–390)
PMV BLD AUTO: 9.9 FL (ref 8.9–12.7)
POTASSIUM SERPL-SCNC: 4 MMOL/L (ref 3.5–5.3)
PROT UR STRIP-MCNC: ABNORMAL MG/DL
RBC # BLD AUTO: 5.06 MILLION/UL (ref 3.88–5.62)
RBC #/AREA URNS AUTO: ABNORMAL /HPF
SODIUM SERPL-SCNC: 139 MMOL/L (ref 135–147)
SP GR UR STRIP.AUTO: 1.01 (ref 1–1.03)
UROBILINOGEN UR QL STRIP.AUTO: 0.2 E.U./DL
WBC # BLD AUTO: 11.89 THOUSAND/UL (ref 4.31–10.16)
WBC #/AREA URNS AUTO: ABNORMAL /HPF

## 2024-02-13 PROCEDURE — 99284 EMERGENCY DEPT VISIT MOD MDM: CPT | Performed by: EMERGENCY MEDICINE

## 2024-02-13 PROCEDURE — 99283 EMERGENCY DEPT VISIT LOW MDM: CPT

## 2024-02-13 PROCEDURE — 87086 URINE CULTURE/COLONY COUNT: CPT | Performed by: EMERGENCY MEDICINE

## 2024-02-13 PROCEDURE — G1004 CDSM NDSC: HCPCS

## 2024-02-13 PROCEDURE — 80048 BASIC METABOLIC PNL TOTAL CA: CPT | Performed by: EMERGENCY MEDICINE

## 2024-02-13 PROCEDURE — 74176 CT ABD & PELVIS W/O CONTRAST: CPT

## 2024-02-13 PROCEDURE — 36415 COLL VENOUS BLD VENIPUNCTURE: CPT | Performed by: EMERGENCY MEDICINE

## 2024-02-13 PROCEDURE — 81001 URINALYSIS AUTO W/SCOPE: CPT | Performed by: EMERGENCY MEDICINE

## 2024-02-13 PROCEDURE — 85025 COMPLETE CBC W/AUTO DIFF WBC: CPT | Performed by: EMERGENCY MEDICINE

## 2024-02-13 PROCEDURE — 96360 HYDRATION IV INFUSION INIT: CPT

## 2024-02-13 PROCEDURE — 87077 CULTURE AEROBIC IDENTIFY: CPT | Performed by: EMERGENCY MEDICINE

## 2024-02-13 PROCEDURE — 87147 CULTURE TYPE IMMUNOLOGIC: CPT | Performed by: EMERGENCY MEDICINE

## 2024-02-13 RX ORDER — CIPROFLOXACIN 500 MG/1
500 TABLET, FILM COATED ORAL 2 TIMES DAILY
Qty: 14 TABLET | Refills: 0 | Status: SHIPPED | OUTPATIENT
Start: 2024-02-13 | End: 2024-02-20

## 2024-02-13 RX ADMIN — SODIUM CHLORIDE 1000 ML: 0.9 INJECTION, SOLUTION INTRAVENOUS at 11:15

## 2024-02-13 NOTE — ED PROVIDER NOTES
History  Chief Complaint   Patient presents with    Blood in Urine     Blood in urine this morning. States he has a burning sensation. And feels like he has to go but can't           Patient is a 43-year-old male presenting with blood in his urine.  Patient states he awoke in normal state of health this morning.  He states he voided normally.  He states they went out to shovel snow and when he came in he states he felt a burning sensation while he was voiding and noted very dark urine.  This occurred 1 other time prompting him to proceed to the emergency department for further evaluation.  No previous episodes of hematuria.  No previous history of kidney stones.  Denies any flank pain.  Denies any recent change in medications or dietary intake.  No history of renal issues.  No recent travel.  No recent trauma injury or illness.  Denies any urethral discharge.  Patient is circumcised.  No new sexual partners.  Patient states he's been in a monogamous relationship for last 13 years.        Prior to Admission Medications   Prescriptions Last Dose Informant Patient Reported? Taking?   cyclobenzaprine (FLEXERIL) 10 mg tablet   No No   Sig: Take 1 tablet (10 mg total) by mouth 2 (two) times a day as needed for muscle spasms Do not drive or drink alcohol while using   Patient not taking: Reported on 11/2/2023   diclofenac (VOLTAREN) 75 mg EC tablet   No No   Sig: Take 1 tablet (75 mg total) by mouth 2 (two) times a day as needed (pain with food)   Patient not taking: Reported on 11/2/2023   ibuprofen (MOTRIN) 400 mg tablet   No No   Sig: Take 1 tablet (400 mg total) by mouth every 6 (six) hours as needed for mild pain   Patient not taking: Reported on 11/2/2023   lidocaine (Lidoderm) 5 %   No No   Sig: Apply 1 patch topically over 12 hours daily Remove & Discard patch within 12 hours or as directed by MD   Patient not taking: Reported on 11/2/2023      Facility-Administered Medications Last Administration Doses Remaining    aspirin chewable tablet 324 mg 10/1/2019 10:00 AM           Past Medical History:   Diagnosis Date    Sepsis (HCC)        Past Surgical History:   Procedure Laterality Date    CYST REMOVAL      from tailbone    NO PAST SURGERIES         Family History   Problem Relation Age of Onset    Hypertension Father     Heart disease Father      I have reviewed and agree with the history as documented.    E-Cigarette/Vaping    E-Cigarette Use Never User      E-Cigarette/Vaping Substances    Nicotine Yes     THC No     CBD No     Flavoring No     Other No     Unknown No      Social History     Tobacco Use    Smoking status: Every Day     Current packs/day: 1.00     Types: Cigarettes    Smokeless tobacco: Never   Vaping Use    Vaping status: Never Used   Substance Use Topics    Alcohol use: Not Currently     Comment: socially    Drug use: No       Review of Systems   Constitutional:  Negative for activity change, appetite change, chills and fever.   HENT:  Negative for ear pain, hearing loss, rhinorrhea, sneezing, sore throat and trouble swallowing.    Eyes:  Negative for pain and visual disturbance.   Respiratory:  Negative for cough, choking, chest tightness, shortness of breath, wheezing and stridor.    Cardiovascular:  Negative for chest pain, palpitations and leg swelling.   Gastrointestinal:  Negative for abdominal pain, constipation, diarrhea, nausea and vomiting.   Genitourinary:  Positive for hematuria. Negative for difficulty urinating, dysuria, frequency and testicular pain.   Musculoskeletal:  Negative for arthralgias, back pain, gait problem and neck pain.   Skin:  Negative for color change and rash.   Allergic/Immunologic: Negative for immunocompromised state.   Neurological:  Negative for dizziness, seizures, syncope, speech difficulty, weakness, light-headedness, numbness and headaches.   Psychiatric/Behavioral:  Negative for confusion. The patient is not hyperactive.    All other systems reviewed and are  negative.      Physical Exam  Physical Exam  Vitals and nursing note reviewed.   Constitutional:       General: He is not in acute distress.     Appearance: Normal appearance. He is well-developed and normal weight. He is not ill-appearing, toxic-appearing or diaphoretic.   HENT:      Head: Normocephalic and atraumatic.      Nose: Nose normal.      Mouth/Throat:      Mouth: Mucous membranes are moist.      Pharynx: Oropharynx is clear.   Eyes:      General: No scleral icterus.     Extraocular Movements: Extraocular movements intact.      Conjunctiva/sclera: Conjunctivae normal.   Cardiovascular:      Rate and Rhythm: Normal rate and regular rhythm.      Pulses: Normal pulses.      Heart sounds: No murmur heard.  Pulmonary:      Effort: Pulmonary effort is normal. No respiratory distress.      Breath sounds: Normal breath sounds. No wheezing or rales.   Chest:      Chest wall: No tenderness.   Abdominal:      General: Bowel sounds are normal. There is no distension.      Palpations: Abdomen is soft. There is no mass.      Tenderness: There is no abdominal tenderness. There is no right CVA tenderness, left CVA tenderness, guarding or rebound.   Genitourinary:     Penis: Normal and circumcised.    Musculoskeletal:         General: No swelling, tenderness or signs of injury. Normal range of motion.      Cervical back: Normal range of motion and neck supple.      Right lower leg: No edema.      Left lower leg: No edema.   Lymphadenopathy:      Cervical: No cervical adenopathy.   Skin:     General: Skin is warm and dry.      Capillary Refill: Capillary refill takes less than 2 seconds.      Coloration: Skin is not jaundiced or pale.      Findings: No erythema, lesion or rash.   Neurological:      General: No focal deficit present.      Mental Status: He is alert and oriented to person, place, and time.      Motor: No abnormal muscle tone.   Psychiatric:         Mood and Affect: Mood normal.         Behavior: Behavior  normal.         Vital Signs  ED Triage Vitals [02/13/24 1032]   Temperature Pulse Respirations Blood Pressure SpO2   (!) 97.2 °F (36.2 °C) 70 18 118/69 98 %      Temp Source Heart Rate Source Patient Position - Orthostatic VS BP Location FiO2 (%)   Temporal Monitor Sitting Left arm --      Pain Score       --           Vitals:    02/13/24 1032   BP: 118/69   Pulse: 70   Patient Position - Orthostatic VS: Sitting         Visual Acuity      ED Medications  Medications   sodium chloride 0.9 % bolus 1,000 mL (1,000 mL Intravenous New Bag 2/13/24 1115)       Diagnostic Studies  Results Reviewed       Procedure Component Value Units Date/Time    Basic metabolic panel [[id number]] Collected: 02/13/24 1115    Lab Status: Final result Specimen: Blood from Arm, Right Updated: 02/13/24 1137     Sodium 139 mmol/L      Potassium 4.0 mmol/L      Chloride 104 mmol/L      CO2 29 mmol/L      ANION GAP 6 mmol/L      BUN 10 mg/dL      Creatinine 0.86 mg/dL      Glucose 93 mg/dL      Calcium 9.3 mg/dL      eGFR 107 ml/min/1.73sq m     Narrative:      National Kidney Disease Foundation guidelines for Chronic Kidney Disease (CKD):     Stage 1 with normal or high GFR (GFR > 90 mL/min/1.73 square meters)    Stage 2 Mild CKD (GFR = 60-89 mL/min/1.73 square meters)    Stage 3A Moderate CKD (GFR = 45-59 mL/min/1.73 square meters)    Stage 3B Moderate CKD (GFR = 30-44 mL/min/1.73 square meters)    Stage 4 Severe CKD (GFR = 15-29 mL/min/1.73 square meters)    Stage 5 End Stage CKD (GFR <15 mL/min/1.73 square meters)  Note: GFR calculation is accurate only with a steady state creatinine    CBC and differential [[id number]]  (Abnormal) Collected: 02/13/24 1115    Lab Status: Final result Specimen: Blood from Arm, Right Updated: 02/13/24 1121     WBC 11.89 Thousand/uL      RBC 5.06 Million/uL      Hemoglobin 15.4 g/dL      Hematocrit 45.4 %      MCV 90 fL      MCH 30.4 pg      MCHC 33.9 g/dL      RDW 11.8 %      MPV 9.9 fL      Platelets 221  Thousands/uL      nRBC 0 /100 WBCs      Neutrophils Relative 75 %      Immat GRANS % 0 %      Lymphocytes Relative 17 %      Monocytes Relative 7 %      Eosinophils Relative 1 %      Basophils Relative 0 %      Neutrophils Absolute 8.81 Thousands/µL      Immature Grans Absolute 0.03 Thousand/uL      Lymphocytes Absolute 2.03 Thousands/µL      Monocytes Absolute 0.84 Thousand/µL      Eosinophils Absolute 0.14 Thousand/µL      Basophils Absolute 0.04 Thousands/µL     Urine Microscopic [[id number]]  (Abnormal) Collected: 02/13/24 1048    Lab Status: Final result Specimen: Urine, Clean Catch Updated: 02/13/24 1117     RBC, UA 20-30 /hpf      WBC, UA Innumerable /hpf      Epithelial Cells None Seen /hpf      Bacteria, UA Occasional /hpf     Urine culture [[id number]] Collected: 02/13/24 1048    Lab Status: In process Specimen: Urine, Clean Catch Updated: 02/13/24 1117    UA w Reflex to Microscopic w Reflex to Culture [[id number]]  (Abnormal) Collected: 02/13/24 1048    Lab Status: Final result Specimen: Urine, Clean Catch Updated: 02/13/24 1105     Color, UA Yellow     Clarity, UA Cloudy     Specific Gravity, UA 1.010     pH, UA 6.0     Leukocytes, UA Moderate     Nitrite, UA Negative     Protein,  (2+) mg/dl      Glucose, UA Negative mg/dl      Ketones, UA Negative mg/dl      Urobilinogen, UA 0.2 E.U./dl      Bilirubin, UA Negative     Occult Blood, UA Large                   CT renal stone study abdomen pelvis without contrast   Final Result by Manpreet Walton MD (02/13 1137)      No radiopaque urinary tract calculi or obstructive uropathy.      Minimal thickening of the bladder. Correlate with UA to exclude cystitis.      Additional chronic findings and negatives as above.         Workstation performed: [id number]                    Procedures  Procedures         ED Course                               SBIRT 22yo+      Flowsheet Row Most Recent Value   Initial Alcohol Screen: US AUDIT-C     1. How often  do you have a drink containing alcohol? 0 Filed at: 02/13/2024 1033   2. How many drinks containing alcohol do you have on a typical day you are drinking?  0 Filed at: 02/13/2024 1033   3a. Male UNDER 65: How often do you have five or more drinks on one occasion? 0 Filed at: 02/13/2024 1033   Audit-C Score 0 Filed at: 02/13/2024 1033   DARA: How many times in the past year have you...    Used an illegal drug or used a prescription medication for non-medical reasons? Never Filed at: 02/13/2024 1033                      Medical Decision Making  43-year-old male presenting with dark urine and discomfort while voiding.  Described as a burning sensation.    Problems Addressed:  Acute cystitis with hematuria: acute illness or injury  Hematuria: acute illness or injury  Urinary tract infection: acute illness or injury    Amount and/or Complexity of Data Reviewed  Labs: ordered. Decision-making details documented in ED Course.  Radiology: ordered and independent interpretation performed. Decision-making details documented in ED Course.    Risk  OTC drugs.  Prescription drug management.  Risk Details: CT and labs reviewed.  Will treat for acute cystitis.  And ambulatory referral to urology was placed.  No evidence for sepsis, severe sepsis or septic shock.  No evidence for urinary obstruction.             Disposition  Final diagnoses:   Urinary tract infection   Hematuria   Acute cystitis with hematuria     Time reflects when diagnosis was documented in both MDM as applicable and the Disposition within this note       Time User Action Codes Description Comment    2/13/2024 11:44 AM Maksim Shore Add [N39.0] Urinary tract infection     2/13/2024 11:44 AM aMksim Shore Add [R31.9] Hematuria     2/13/2024 11:44 AM Maksim Shore Add [N30.01] Acute cystitis with hematuria           ED Disposition       ED Disposition   Discharge    Condition   Stable    Date/Time   Tue Feb 13, 2024 1144    Comment   Augustin SALGADO  Miriam discharge to home/self care.                   Follow-up Information       Follow up With Specialties Details Why Contact Info Additional Information    [facility] [place] [facility] Schedule an appointment as soon as possible for a visit   [address] [place] [address] [phone] [facility] [place], [facility], [place], [address] [phone]            Patient's Medications   Discharge Prescriptions    CIPROFLOXACIN (CIPRO) 500 MG TABLET    Take 1 tablet (500 mg total) by mouth 2 (two) times a day for 7 days       Start Date: 2/13/2024 End Date: 2/20/2024       Order Dose: 500 mg       Quantity: 14 tablet    Refills: 0           PDMP Review       None            ED Provider  Electronically Signed by             Maksim Shore DO  02/13/24 1140

## 2024-02-13 NOTE — DISCHARGE INSTRUCTIONS
Please take antibiotic as directed until completed.  Please follow-up with your primary care physician and/or urology.  A ambulatory referral for urology was placed on your behalf.  Please return to the emergency department condition worsens, especially if any difficulty voiding, fevers and chills, increased pain.

## 2024-02-14 LAB — BACTERIA UR CULT: ABNORMAL

## 2024-02-21 PROBLEM — R50.9 FEVER: Status: RESOLVED | Noted: 2018-09-04 | Resolved: 2024-02-21

## 2024-08-26 ENCOUNTER — HOSPITAL ENCOUNTER (EMERGENCY)
Facility: HOSPITAL | Age: 44
Discharge: HOME | End: 2024-08-26
Attending: EMERGENCY MEDICINE
Payer: COMMERCIAL

## 2024-08-26 ENCOUNTER — APPOINTMENT (EMERGENCY)
Dept: CT IMAGING | Facility: HOSPITAL | Age: 44
End: 2024-08-26
Payer: COMMERCIAL

## 2024-08-26 VITALS
DIASTOLIC BLOOD PRESSURE: 83 MMHG | HEART RATE: 55 BPM | RESPIRATION RATE: 16 BRPM | SYSTOLIC BLOOD PRESSURE: 129 MMHG | OXYGEN SATURATION: 97 % | TEMPERATURE: 98.5 F | WEIGHT: 195 LBS | BODY MASS INDEX: 25.04 KG/M2

## 2024-08-26 DIAGNOSIS — R10.9 ABDOMINAL PAIN: Primary | ICD-10-CM

## 2024-08-26 LAB
ALBUMIN SERPL BCG-MCNC: 4.5 G/DL (ref 3.5–5)
ALP SERPL-CCNC: 63 U/L (ref 34–104)
ALT SERPL W P-5'-P-CCNC: 12 U/L (ref 7–52)
ANION GAP SERPL CALCULATED.3IONS-SCNC: 8 MMOL/L (ref 4–13)
AST SERPL W P-5'-P-CCNC: 15 U/L (ref 13–39)
BACTERIA UR QL AUTO: NORMAL /HPF
BASOPHILS # BLD AUTO: 0.04 THOUSANDS/ÂΜL (ref 0–0.1)
BASOPHILS NFR BLD AUTO: 0 % (ref 0–1)
BILIRUB SERPL-MCNC: 0.62 MG/DL (ref 0.2–1)
BILIRUB UR QL STRIP: NEGATIVE
BUN SERPL-MCNC: 12 MG/DL (ref 5–25)
CALCIUM SERPL-MCNC: 9.2 MG/DL (ref 8.4–10.2)
CHLORIDE SERPL-SCNC: 103 MMOL/L (ref 96–108)
CLARITY UR: ABNORMAL
CO2 SERPL-SCNC: 27 MMOL/L (ref 21–32)
COLOR UR: YELLOW
CREAT SERPL-MCNC: 0.86 MG/DL (ref 0.6–1.3)
EOSINOPHIL # BLD AUTO: 0.18 THOUSAND/ÂΜL (ref 0–0.61)
EOSINOPHIL NFR BLD AUTO: 2 % (ref 0–6)
ERYTHROCYTE [DISTWIDTH] IN BLOOD BY AUTOMATED COUNT: 11.9 % (ref 11.6–15.1)
GFR SERPL CREATININE-BSD FRML MDRD: 106 ML/MIN/1.73SQ M
GLUCOSE SERPL-MCNC: 110 MG/DL (ref 65–140)
GLUCOSE UR STRIP-MCNC: NEGATIVE MG/DL
HCT VFR BLD AUTO: 44.4 % (ref 36.5–49.3)
HGB BLD-MCNC: 14.9 G/DL (ref 12–17)
HGB UR QL STRIP.AUTO: NEGATIVE
IMM GRANULOCYTES # BLD AUTO: 0.02 THOUSAND/UL (ref 0–0.2)
IMM GRANULOCYTES NFR BLD AUTO: 0 % (ref 0–2)
KETONES UR STRIP-MCNC: NEGATIVE MG/DL
LEUKOCYTE ESTERASE UR QL STRIP: NEGATIVE
LIPASE SERPL-CCNC: 10 U/L (ref 11–82)
LYMPHOCYTES # BLD AUTO: 2.7 THOUSANDS/ÂΜL (ref 0.6–4.47)
LYMPHOCYTES NFR BLD AUTO: 27 % (ref 14–44)
MCH RBC QN AUTO: 30.7 PG (ref 26.8–34.3)
MCHC RBC AUTO-ENTMCNC: 33.6 G/DL (ref 31.4–37.4)
MCV RBC AUTO: 92 FL (ref 82–98)
MONOCYTES # BLD AUTO: 0.72 THOUSAND/ÂΜL (ref 0.17–1.22)
MONOCYTES NFR BLD AUTO: 7 % (ref 4–12)
NEUTROPHILS # BLD AUTO: 6.29 THOUSANDS/ÂΜL (ref 1.85–7.62)
NEUTS SEG NFR BLD AUTO: 64 % (ref 43–75)
NITRITE UR QL STRIP: NEGATIVE
NON-SQ EPI CELLS URNS QL MICRO: NORMAL /HPF
NRBC BLD AUTO-RTO: 0 /100 WBCS
PH UR STRIP.AUTO: 6 [PH]
PLATELET # BLD AUTO: 216 THOUSANDS/UL (ref 149–390)
PMV BLD AUTO: 10.3 FL (ref 8.9–12.7)
POTASSIUM SERPL-SCNC: 3.8 MMOL/L (ref 3.5–5.3)
PROT SERPL-MCNC: 7.3 G/DL (ref 6.4–8.4)
PROT UR STRIP-MCNC: ABNORMAL MG/DL
RBC # BLD AUTO: 4.85 MILLION/UL (ref 3.88–5.62)
RBC #/AREA URNS AUTO: NORMAL /HPF
SODIUM SERPL-SCNC: 138 MMOL/L (ref 135–147)
SP GR UR STRIP.AUTO: 1.02 (ref 1–1.03)
UROBILINOGEN UR STRIP-ACNC: <2 MG/DL
WBC # BLD AUTO: 9.95 THOUSAND/UL (ref 4.31–10.16)
WBC #/AREA URNS AUTO: NORMAL /HPF

## 2024-08-26 PROCEDURE — 85025 COMPLETE CBC W/AUTO DIFF WBC: CPT | Performed by: PHYSICIAN ASSISTANT

## 2024-08-26 PROCEDURE — 36415 COLL VENOUS BLD VENIPUNCTURE: CPT | Performed by: PHYSICIAN ASSISTANT

## 2024-08-26 PROCEDURE — 81001 URINALYSIS AUTO W/SCOPE: CPT | Performed by: PHYSICIAN ASSISTANT

## 2024-08-26 PROCEDURE — 96365 THER/PROPH/DIAG IV INF INIT: CPT

## 2024-08-26 PROCEDURE — 99284 EMERGENCY DEPT VISIT MOD MDM: CPT

## 2024-08-26 PROCEDURE — 96375 TX/PRO/DX INJ NEW DRUG ADDON: CPT

## 2024-08-26 PROCEDURE — 80053 COMPREHEN METABOLIC PANEL: CPT | Performed by: PHYSICIAN ASSISTANT

## 2024-08-26 PROCEDURE — 99285 EMERGENCY DEPT VISIT HI MDM: CPT | Performed by: PHYSICIAN ASSISTANT

## 2024-08-26 PROCEDURE — 83690 ASSAY OF LIPASE: CPT | Performed by: PHYSICIAN ASSISTANT

## 2024-08-26 PROCEDURE — 74177 CT ABD & PELVIS W/CONTRAST: CPT

## 2024-08-26 RX ORDER — SODIUM CHLORIDE, SODIUM GLUCONATE, SODIUM ACETATE, POTASSIUM CHLORIDE, MAGNESIUM CHLORIDE, SODIUM PHOSPHATE, DIBASIC, AND POTASSIUM PHOSPHATE .53; .5; .37; .037; .03; .012; .00082 G/100ML; G/100ML; G/100ML; G/100ML; G/100ML; G/100ML; G/100ML
1000 INJECTION, SOLUTION INTRAVENOUS ONCE
Status: COMPLETED | OUTPATIENT
Start: 2024-08-26 | End: 2024-08-26

## 2024-08-26 RX ORDER — ONDANSETRON 4 MG/1
4 TABLET, ORALLY DISINTEGRATING ORAL EVERY 6 HOURS PRN
Qty: 12 TABLET | Refills: 0 | Status: SHIPPED | OUTPATIENT
Start: 2024-08-26

## 2024-08-26 RX ORDER — KETOROLAC TROMETHAMINE 30 MG/ML
15 INJECTION, SOLUTION INTRAMUSCULAR; INTRAVENOUS ONCE
Status: COMPLETED | OUTPATIENT
Start: 2024-08-26 | End: 2024-08-26

## 2024-08-26 RX ORDER — ONDANSETRON 2 MG/ML
4 INJECTION INTRAMUSCULAR; INTRAVENOUS ONCE
Status: DISCONTINUED | OUTPATIENT
Start: 2024-08-26 | End: 2024-08-26 | Stop reason: HOSPADM

## 2024-08-26 RX ORDER — KETOROLAC TROMETHAMINE 10 MG/1
10 TABLET, FILM COATED ORAL EVERY 6 HOURS PRN
Qty: 20 TABLET | Refills: 0 | Status: SHIPPED | OUTPATIENT
Start: 2024-08-26

## 2024-08-26 RX ADMIN — SODIUM CHLORIDE, SODIUM GLUCONATE, SODIUM ACETATE, POTASSIUM CHLORIDE, MAGNESIUM CHLORIDE, SODIUM PHOSPHATE, DIBASIC, AND POTASSIUM PHOSPHATE 1000 ML: .53; .5; .37; .037; .03; .012; .00082 INJECTION, SOLUTION INTRAVENOUS at 13:07

## 2024-08-26 RX ADMIN — KETOROLAC TROMETHAMINE 15 MG: 30 INJECTION, SOLUTION INTRAMUSCULAR at 13:07

## 2024-08-26 RX ADMIN — IOHEXOL 100 ML: 350 INJECTION, SOLUTION INTRAVENOUS at 13:32

## 2024-08-26 NOTE — ED PROVIDER NOTES
History  Chief Complaint   Patient presents with    Abdominal Pain     Patient reports pain that started in his lower back Saturday and is now in his lower abdomen, pain is worse on the RLQ, reports no abdominal surgeries. +N     44 year old male with no significant reported PMH presenting ambulatory from home for evaluation of abdominal pain.  Pt reports he had pain that started on Saturday after working the yard.  He notes pain was in his lower back.  No specific injury or trauma.  He notes he began getting sick to his stomach and contributed this to the pain in his back.  He notes the back pain has resolved, now having pain in his lower abdomen, primarily around RLQ.  Pain has been constant, no reported aggravating or alleviating factors.  He notes nausea.  Denies vomiting.  Denies diarrhea or constipation.  Denies dysuria, frequency, urgency or hematuria.  However he notes urine decreased.  He states he normally pees all the time and now feels like he can't go.  He denies any personal h/o kidney stones or urinary infections.  Significant other states his urine does smell funny.  He took ibuprofen last night and also tried pepto bismol without relief.  No reported aggravating or alleviating factors.  Denies any prior abdominal surgeries.  No prior evaluation since onset.  No recent illness.      History provided by:  Patient and medical records   used: No        Prior to Admission Medications   Prescriptions Last Dose Informant Patient Reported? Taking?   cyclobenzaprine (FLEXERIL) 10 mg tablet   No No   Sig: Take 1 tablet (10 mg total) by mouth 2 (two) times a day as needed for muscle spasms Do not drive or drink alcohol while using   Patient not taking: Reported on 11/2/2023   diclofenac (VOLTAREN) 75 mg EC tablet   No No   Sig: Take 1 tablet (75 mg total) by mouth 2 (two) times a day as needed (pain with food)   Patient not taking: Reported on 11/2/2023   ibuprofen (MOTRIN) 400 mg tablet    No No   Sig: Take 1 tablet (400 mg total) by mouth every 6 (six) hours as needed for mild pain   Patient not taking: Reported on 11/2/2023   lidocaine (Lidoderm) 5 %   No No   Sig: Apply 1 patch topically over 12 hours daily Remove & Discard patch within 12 hours or as directed by MD   Patient not taking: Reported on 11/2/2023      Facility-Administered Medications Last Administration Doses Remaining   aspirin chewable tablet 324 mg 10/1/2019 10:00 AM           Past Medical History:   Diagnosis Date    Sepsis (HCC)        Past Surgical History:   Procedure Laterality Date    CYST REMOVAL      from tailbone    NO PAST SURGERIES         Family History   Problem Relation Age of Onset    Hypertension Father     Heart disease Father      I have reviewed and agree with the history as documented.    E-Cigarette/Vaping    E-Cigarette Use Never User      E-Cigarette/Vaping Substances    Nicotine Yes     THC No     CBD No     Flavoring No     Other No     Unknown No      Social History     Tobacco Use    Smoking status: Every Day     Current packs/day: 1.00     Types: Cigarettes    Smokeless tobacco: Never   Vaping Use    Vaping status: Never Used   Substance Use Topics    Alcohol use: Not Currently     Comment: socially    Drug use: No       Review of Systems   Constitutional: Negative.  Negative for chills, fatigue and fever.   HENT: Negative.  Negative for congestion, rhinorrhea and sore throat.    Eyes: Negative.  Negative for visual disturbance.   Respiratory: Negative.  Negative for cough, shortness of breath and wheezing.    Cardiovascular: Negative.  Negative for chest pain, palpitations and leg swelling.   Gastrointestinal:  Positive for abdominal pain and nausea. Negative for constipation, diarrhea and vomiting.   Genitourinary:  Positive for decreased urine volume. Negative for dysuria, flank pain, frequency and hematuria.   Musculoskeletal:  Positive for back pain. Negative for neck pain.   Skin: Negative.   Negative for rash.   Neurological: Negative.  Negative for dizziness, light-headedness, numbness and headaches.   Psychiatric/Behavioral: Negative.     All other systems reviewed and are negative.      Physical Exam  Physical Exam  Vitals and nursing note reviewed.   Constitutional:       General: He is awake. He is not in acute distress.     Appearance: Normal appearance. He is well-developed. He is not toxic-appearing or diaphoretic.   HENT:      Head: Normocephalic and atraumatic.      Right Ear: Hearing and external ear normal.      Left Ear: Hearing and external ear normal.      Mouth/Throat:      Mouth: Mucous membranes are moist.      Pharynx: Oropharynx is clear.   Eyes:      General: Lids are normal. No scleral icterus.     Conjunctiva/sclera: Conjunctivae normal.      Pupils: Pupils are equal, round, and reactive to light.   Neck:      Trachea: Trachea and phonation normal.   Cardiovascular:      Rate and Rhythm: Normal rate and regular rhythm.      Pulses: Normal pulses.      Heart sounds: Normal heart sounds, S1 normal and S2 normal. No murmur heard.  Pulmonary:      Effort: Pulmonary effort is normal. No tachypnea or respiratory distress.      Breath sounds: Normal breath sounds. No wheezing, rhonchi or rales.   Abdominal:      General: Bowel sounds are normal. There is no distension.      Palpations: Abdomen is soft.      Tenderness: There is abdominal tenderness in the suprapubic area. There is no right CVA tenderness, left CVA tenderness, guarding or rebound.   Musculoskeletal:         General: No tenderness. Normal range of motion.      Cervical back: Normal range of motion and neck supple.      Right lower leg: No edema.      Left lower leg: No edema.   Skin:     General: Skin is warm and dry.      Capillary Refill: Capillary refill takes less than 2 seconds.      Findings: No rash.   Neurological:      General: No focal deficit present.      Mental Status: He is alert and oriented to person,  place, and time.      GCS: GCS eye subscore is 4. GCS verbal subscore is 5. GCS motor subscore is 6.      Gait: Gait normal.   Psychiatric:         Mood and Affect: Mood normal.         Speech: Speech normal.         Behavior: Behavior normal. Behavior is cooperative.         Vital Signs  ED Triage Vitals [08/26/24 1247]   Temperature Pulse Respirations Blood Pressure SpO2   98.5 °F (36.9 °C) 69 18 114/82 98 %      Temp Source Heart Rate Source Patient Position - Orthostatic VS BP Location FiO2 (%)   Temporal Monitor Sitting Right arm --      Pain Score       8           Vitals:    08/26/24 1247 08/26/24 1300 08/26/24 1400 08/26/24 1500   BP: 114/82 113/81 129/81 129/83   Pulse: 69 56 55 55   Patient Position - Orthostatic VS: Sitting Sitting Sitting Sitting         Visual Acuity      ED Medications  Medications   ondansetron (ZOFRAN) injection 4 mg (0 mg Intravenous Hold 8/26/24 1310)   ketorolac (TORADOL) injection 15 mg (15 mg Intravenous Given 8/26/24 1307)   multi-electrolyte (ISOLYTE-S PH 7.4) bolus 1,000 mL (0 mL Intravenous Stopped 8/26/24 1407)   iohexol (OMNIPAQUE) 350 MG/ML injection (MULTI-DOSE) 100 mL (100 mL Intravenous Given 8/26/24 1332)       Diagnostic Studies  Results Reviewed       Procedure Component Value Units Date/Time    Urine culture [[id number]]     Lab Status: No result Specimen: Urine, Clean Catch     Urine Microscopic [[id number]]  (Normal) Collected: 08/26/24 1352    Lab Status: Final result Specimen: Urine, Clean Catch Updated: 08/26/24 1415     RBC, UA None Seen /hpf      WBC, UA 1-2 /hpf      Epithelial Cells None Seen /hpf      Bacteria, UA None Seen /hpf     UA w Reflex to Microscopic w Reflex to Culture [[id number]]  (Abnormal) Collected: 08/26/24 1352    Lab Status: Final result Specimen: Urine, Clean Catch Updated: 08/26/24 1414     Color, UA Yellow     Clarity, UA Hazy     Specific Gravity, UA 1.020     pH, UA 6.0     Leukocytes, UA Negative     Nitrite, UA Negative     Protein,  UA Trace mg/dl      Glucose, UA Negative mg/dl      Ketones, UA Negative mg/dl      Urobilinogen, UA <2.0 mg/dl      Bilirubin, UA Negative     Occult Blood, UA Negative    Comprehensive metabolic panel [[id number]] Collected: 08/26/24 1300    Lab Status: Final result Specimen: Blood from Arm, Left Updated: 08/26/24 1322     Sodium 138 mmol/L      Potassium 3.8 mmol/L      Chloride 103 mmol/L      CO2 27 mmol/L      ANION GAP 8 mmol/L      BUN 12 mg/dL      Creatinine 0.86 mg/dL      Glucose 110 mg/dL      Calcium 9.2 mg/dL      AST 15 U/L      ALT 12 U/L      Alkaline Phosphatase 63 U/L      Total Protein 7.3 g/dL      Albumin 4.5 g/dL      Total Bilirubin 0.62 mg/dL      eGFR 106 ml/min/1.73sq m     Narrative:      National Kid[employer] Disease Foundation guidelines for Chronic Kidney Disease (CKD):     Stage 1 with normal or high GFR (GFR > 90 mL/min/1.73 square meters)    Stage 2 Mild CKD (GFR = 60-89 mL/min/1.73 square meters)    Stage 3A Moderate CKD (GFR = 45-59 mL/min/1.73 square meters)    Stage 3B Moderate CKD (GFR = 30-44 mL/min/1.73 square meters)    Stage 4 Severe CKD (GFR = 15-29 mL/min/1.73 square meters)    Stage 5 End Stage CKD (GFR <15 mL/min/1.73 square meters)  Note: GFR calculation is accurate only with a steady state creatinine    Lipase [[id number]]  (Abnormal) Collected: 08/26/24 1300    Lab Status: Final result Specimen: Blood from Arm, Left Updated: 08/26/24 1322     Lipase 10 u/L     CBC and differential [[id number]] Collected: 08/26/24 1300    Lab Status: Final result Specimen: Blood from Arm, Left Updated: 08/26/24 1307     WBC 9.95 Thousand/uL      RBC 4.85 Million/uL      Hemoglobin 14.9 g/dL      Hematocrit 44.4 %      MCV 92 fL      MCH 30.7 pg      MCHC 33.6 g/dL      RDW 11.9 %      MPV 10.3 fL      Platelets 216 Thousands/uL      nRBC 0 /100 WBCs      Segmented % 64 %      Immature Grans % 0 %      Lymphocytes % 27 %      Monocytes % 7 %      Eosinophils Relative 2 %      Basophils  Relative 0 %      Absolute Neutrophils 6.29 Thousands/µL      Absolute Immature Grans 0.02 Thousand/uL      Absolute Lymphocytes 2.70 Thousands/µL      Absolute Monocytes 0.72 Thousand/µL      Eosinophils Absolute 0.18 Thousand/µL      Basophils Absolute 0.04 Thousands/µL                    CT abdomen pelvis with contrast   Final Result by Tc Lacey MD (08/26 1438)      No acute inflammatory process identified.      Colonic diverticulosis without diverticulitis.         Workstation performed: [id number]                    Procedures  Procedures         ED Course  ED Course as of 08/26/24 1629   Mon Aug 26, 2024   1315 WBC: 9.95   1315 Hemoglobin: 14.9   1315 Platelet Count: 216   1322 GLUCOSE: 110   1323 Creatinine: 0.86  stable   1323 BUN: 12   1323 Sodium: 138   1323 Potassium: 3.8   1323 Chloride: 103   1323 Carbon Dioxide: 27   1323 ANION GAP: 8   1323 Calcium: 9.2   1323 AST: 15   1323 ALT: 12   1323 ALK PHOS: 63   1323 Total Protein: 7.3   1323 Albumin: 4.5   1323 Total Bilirubin: 0.62   1323 GFR, Calculated: 106   1323 LIPASE(!): 10  Below reference range.   1352 Post void residual = 22 cc; not consistent with urinary retention.   1414 POCT URINE PROTEIN(!): Trace  UA shows trace protein, otherwise negative   1444 CT abdomen pelvis with contrast  IMPRESSION:     No acute inflammatory process identified.     Colonic diverticulosis without diverticulitis.   1510 Pt reassessed.  He reports feeling improved and states pain is better.  No clear explanation for his pain.  Possible passed kidney stone? given history and locality of pain.                                 SBIRT 22yo+      Flowsheet Row Most Recent Value   Initial Alcohol Screen: US AUDIT-C     1. How often do you have a drink containing alcohol? 0 Filed at: 08/26/2024 1247   2. How many drinks containing alcohol do you have on a typical day you are drinking?  0 Filed at: 08/26/2024 1247   3a. Male UNDER 65: How often do you have five or more  drinks on one occasion? 0 Filed at: 08/26/2024 1246   3b. FEMALE Any Age, or MALE 65+: How often do you have 4 or more drinks on one occassion? 0 Filed at: 08/26/2024 1247   Audit-C Score 0 Filed at: 08/26/2024 1247   DARA: How many times in the past year have you...    Used an illegal drug or used a prescription medication for non-medical reasons? Never Filed at: 08/26/2024 1247                      Medical Decision Making  43 yo male presenting for evaluation of abdominal pain.  Will check labs, urine.  Will obtain bladder scan to evaluate for urinary retention.  Will obtain CT abd/pelv to further evaluate.      Work up obtained as noted above.  No noted leukocytosis, no anemia.  No infectious concerns.  No hypo or hyperglycemia.  Renal function and LFTs within normal limits..  Electrolytes within normal limits.  UA not suggestive of infection.  CT imaging without acute findings.  Incidental diverticulosis without findings of diverticulitis.  A normal appendix was visualized.  A small fat containing left inguinal hernia also noted - no symptoms related to this.  Symptomatically pt did feel improved.  No significant tenderness on repeat exam.  Will discharge with symptomatic management and outpatient follow up.    Reviewed symptomatic management.  OTC meds reviewed.  Anticipatory guidance.  Advised recheck with PCP or return to ER as needed.  Strict return precautions outlined.  Patient voiced understanding and had no further questions.    Please refer to above ER course for further details/discussion.      Problems Addressed:  Abdominal pain: acute illness or injury    Amount and/or Complexity of Data Reviewed  External Data Reviewed: labs, radiology and notes.  Labs: ordered. Decision-making details documented in ED Course.  Radiology: ordered. Decision-making details documented in ED Course.    Risk  OTC drugs.  Prescription drug management.                 Disposition  Final diagnoses:   Abdominal pain     Time  reflects when diagnosis was documented in both MDM as applicable and the Disposition within this note       Time User Action Codes Description Comment    8/26/2024  3:15 PM Hattie Barber Add [R10.9] Abdominal pain           ED Disposition       ED Disposition   Discharge    Condition   Stable    Date/Time   Mon Aug 26, 2024 1520    Comment   Augustin Pineda discharge to home/self care.                   Follow-up Information       Follow up With Specialties Details Why Contact Info Additional Information    [facility] Emergency Department Emergency Medicine  As needed [address] [facility] [id number]  [phone] [facility] Emergency Department, [address], [place], [address]            Discharge Medication List as of 8/26/2024  3:24 PM        START taking these medications    Details   ketorolac (TORADOL) 10 mg tablet Take 1 tablet (10 mg total) by mouth every 6 (six) hours as needed for moderate pain or severe pain, Starting Mon 8/26/2024, Normal      ondansetron (ZOFRAN-ODT) 4 mg disintegrating tablet Take 1 tablet (4 mg total) by mouth every 6 (six) hours as needed for vomiting or nausea, Starting Mon 8/26/2024, Normal           STOP taking these medications       cyclobenzaprine (FLEXERIL) 10 mg tablet Comments:   Reason for Stopping:         diclofenac (VOLTAREN) 75 mg EC tablet Comments:   Reason for Stopping:         ibuprofen (MOTRIN) 400 mg tablet Comments:   Reason for Stopping:         lidocaine (Lidoderm) 5 % Comments:   Reason for Stopping:               No discharge procedures on file.    PDMP Review       None            ED Provider  Electronically Signed by             Hattie Barber PA-C  08/26/24 1901

## 2024-08-26 NOTE — DISCHARGE INSTRUCTIONS
Rest, plenty of fluids.  Continue medications as needed for symptoms.  Zofran as needed for nausea.  Toradol as needed for pain.  Can supplement with OTC tylenol.  Follow up with PCP or return to ER as needed.  Return to ER if having persistent pain, fever, vomiting or any other new concerns.

## 2025-07-23 ENCOUNTER — HOSPITAL ENCOUNTER (EMERGENCY)
Facility: HOSPITAL | Age: 45
Discharge: HOME | End: 2025-07-24
Payer: COMMERCIAL

## 2025-07-23 ENCOUNTER — APPOINTMENT (EMERGENCY)
Dept: RADIOLOGY | Facility: HOSPITAL | Age: 45
End: 2025-07-23
Payer: COMMERCIAL

## 2025-07-23 ENCOUNTER — APPOINTMENT (EMERGENCY)
Dept: CT IMAGING | Facility: HOSPITAL | Age: 45
End: 2025-07-23
Payer: COMMERCIAL

## 2025-07-23 DIAGNOSIS — R09.1 PLEURISY: ICD-10-CM

## 2025-07-23 DIAGNOSIS — I47.29 NSVT (NONSUSTAINED VENTRICULAR TACHYCARDIA) (HCC): Primary | ICD-10-CM

## 2025-07-23 DIAGNOSIS — R07.89 ATYPICAL CHEST PAIN: ICD-10-CM

## 2025-07-23 DIAGNOSIS — M54.9 BACK PAIN: ICD-10-CM

## 2025-07-23 DIAGNOSIS — J02.9 PHARYNGITIS: ICD-10-CM

## 2025-07-23 LAB
ALBUMIN SERPL BCG-MCNC: 4.5 G/DL (ref 3.5–5)
ALP SERPL-CCNC: 74 U/L (ref 34–104)
ALT SERPL W P-5'-P-CCNC: 14 U/L (ref 7–52)
ANION GAP SERPL CALCULATED.3IONS-SCNC: 10 MMOL/L (ref 4–13)
AST SERPL W P-5'-P-CCNC: 16 U/L (ref 13–39)
BASOPHILS # BLD AUTO: 0.03 THOUSANDS/ÂΜL (ref 0–0.1)
BASOPHILS NFR BLD AUTO: 1 % (ref 0–1)
BILIRUB SERPL-MCNC: 0.35 MG/DL (ref 0.2–1)
BNP SERPL-MCNC: 12 PG/ML (ref 0–100)
BUN SERPL-MCNC: 14 MG/DL (ref 5–25)
CALCIUM SERPL-MCNC: 9.4 MG/DL (ref 8.4–10.2)
CARDIAC TROPONIN I PNL SERPL HS: 5 NG/L (ref ?–50)
CHLORIDE SERPL-SCNC: 104 MMOL/L (ref 96–108)
CO2 SERPL-SCNC: 24 MMOL/L (ref 21–32)
CREAT SERPL-MCNC: 0.84 MG/DL (ref 0.6–1.3)
EOSINOPHIL # BLD AUTO: 0.12 THOUSAND/ÂΜL (ref 0–0.61)
EOSINOPHIL NFR BLD AUTO: 2 % (ref 0–6)
ERYTHROCYTE [DISTWIDTH] IN BLOOD BY AUTOMATED COUNT: 11.9 % (ref 11.6–15.1)
FLUAV AG UPPER RESP QL IA.RAPID: NEGATIVE
FLUBV AG UPPER RESP QL IA.RAPID: NEGATIVE
GFR SERPL CREATININE-BSD FRML MDRD: 107 ML/MIN/1.73SQ M
GLUCOSE SERPL-MCNC: 71 MG/DL (ref 65–140)
HCT VFR BLD AUTO: 43.7 % (ref 36.5–49.3)
HGB BLD-MCNC: 14.9 G/DL (ref 12–17)
IMM GRANULOCYTES # BLD AUTO: 0.02 THOUSAND/UL (ref 0–0.2)
IMM GRANULOCYTES NFR BLD AUTO: 0 % (ref 0–2)
LYMPHOCYTES # BLD AUTO: 2.84 THOUSANDS/ÂΜL (ref 0.6–4.47)
LYMPHOCYTES NFR BLD AUTO: 45 % (ref 14–44)
MAGNESIUM SERPL-MCNC: 2 MG/DL (ref 1.9–2.7)
MCH RBC QN AUTO: 30.6 PG (ref 26.8–34.3)
MCHC RBC AUTO-ENTMCNC: 34.1 G/DL (ref 31.4–37.4)
MCV RBC AUTO: 90 FL (ref 82–98)
MONOCYTES # BLD AUTO: 0.8 THOUSAND/ÂΜL (ref 0.17–1.22)
MONOCYTES NFR BLD AUTO: 13 % (ref 4–12)
NEUTROPHILS # BLD AUTO: 2.46 THOUSANDS/ÂΜL (ref 1.85–7.62)
NEUTS SEG NFR BLD AUTO: 39 % (ref 43–75)
NRBC BLD AUTO-RTO: 0 /100 WBCS
PHOSPHATE SERPL-MCNC: 4.1 MG/DL (ref 2.7–4.5)
PLATELET # BLD AUTO: 207 THOUSANDS/UL (ref 149–390)
PMV BLD AUTO: 10.4 FL (ref 8.9–12.7)
POTASSIUM SERPL-SCNC: 3.7 MMOL/L (ref 3.5–5.3)
PROT SERPL-MCNC: 7.2 G/DL (ref 6.4–8.4)
RBC # BLD AUTO: 4.87 MILLION/UL (ref 3.88–5.62)
SARS-COV+SARS-COV-2 AG RESP QL IA.RAPID: NEGATIVE
SODIUM SERPL-SCNC: 138 MMOL/L (ref 135–147)
WBC # BLD AUTO: 6.27 THOUSAND/UL (ref 4.31–10.16)

## 2025-07-23 PROCEDURE — 80053 COMPREHEN METABOLIC PANEL: CPT

## 2025-07-23 PROCEDURE — 83880 ASSAY OF NATRIURETIC PEPTIDE: CPT

## 2025-07-23 PROCEDURE — 99285 EMERGENCY DEPT VISIT HI MDM: CPT

## 2025-07-23 PROCEDURE — 93005 ELECTROCARDIOGRAM TRACING: CPT

## 2025-07-23 PROCEDURE — 71046 X-RAY EXAM CHEST 2 VIEWS: CPT

## 2025-07-23 PROCEDURE — 85025 COMPLETE CBC W/AUTO DIFF WBC: CPT

## 2025-07-23 PROCEDURE — 83735 ASSAY OF MAGNESIUM: CPT

## 2025-07-23 PROCEDURE — 84484 ASSAY OF TROPONIN QUANT: CPT

## 2025-07-23 PROCEDURE — 87804 INFLUENZA ASSAY W/OPTIC: CPT

## 2025-07-23 PROCEDURE — 84100 ASSAY OF PHOSPHORUS: CPT

## 2025-07-23 PROCEDURE — 36415 COLL VENOUS BLD VENIPUNCTURE: CPT

## 2025-07-23 PROCEDURE — 71250 CT THORAX DX C-: CPT

## 2025-07-23 PROCEDURE — 99284 EMERGENCY DEPT VISIT MOD MDM: CPT

## 2025-07-23 PROCEDURE — 96374 THER/PROPH/DIAG INJ IV PUSH: CPT

## 2025-07-23 PROCEDURE — 87811 SARS-COV-2 COVID19 W/OPTIC: CPT

## 2025-07-23 RX ORDER — SODIUM CHLORIDE 9 MG/ML
3 INJECTION INTRAVENOUS
Status: DISCONTINUED | OUTPATIENT
Start: 2025-07-23 | End: 2025-07-24 | Stop reason: HOSPADM

## 2025-07-23 RX ORDER — KETOROLAC TROMETHAMINE 30 MG/ML
15 INJECTION, SOLUTION INTRAMUSCULAR; INTRAVENOUS ONCE
Status: COMPLETED | OUTPATIENT
Start: 2025-07-23 | End: 2025-07-23

## 2025-07-23 RX ORDER — LIDOCAINE 50 MG/G
1 PATCH TOPICAL ONCE
Status: DISCONTINUED | OUTPATIENT
Start: 2025-07-23 | End: 2025-07-24 | Stop reason: HOSPADM

## 2025-07-23 RX ADMIN — KETOROLAC TROMETHAMINE 15 MG: 30 INJECTION, SOLUTION INTRAMUSCULAR at 23:14

## 2025-07-23 RX ADMIN — LIDOCAINE 1 PATCH: 50 PATCH CUTANEOUS at 23:15

## 2025-07-24 VITALS
RESPIRATION RATE: 17 BRPM | SYSTOLIC BLOOD PRESSURE: 107 MMHG | HEART RATE: 54 BPM | OXYGEN SATURATION: 96 % | TEMPERATURE: 97.6 F | DIASTOLIC BLOOD PRESSURE: 65 MMHG

## 2025-07-24 LAB
2HR DELTA HS TROPONIN: 0 NG/L
CARDIAC TROPONIN I PNL SERPL HS: 5 NG/L (ref ?–50)

## 2025-07-24 PROCEDURE — 93005 ELECTROCARDIOGRAM TRACING: CPT

## 2025-07-24 PROCEDURE — 84484 ASSAY OF TROPONIN QUANT: CPT

## 2025-07-24 PROCEDURE — 36415 COLL VENOUS BLD VENIPUNCTURE: CPT

## 2025-07-24 RX ORDER — NAPROXEN 500 MG/1
500 TABLET ORAL 2 TIMES DAILY WITH MEALS
Qty: 30 TABLET | Refills: 0 | Status: SHIPPED | OUTPATIENT
Start: 2025-07-24

## 2025-07-24 NOTE — DISCHARGE INSTRUCTIONS
Please come medially back to the emergency department if you have palpitations (heart is beating very fast), or if you are having worsening chest pain.

## 2025-07-24 NOTE — ED PROVIDER NOTES
Time reflects when diagnosis was documented in both MDM as applicable and the Disposition within this note       Time User Action Codes Description Comment    7/24/2025 12:18 AM Mukesh Cormier [I47.29] NSVT (nonsustained ventricular tachycardia) (HCC)     7/24/2025 12:18 AM Mukesh Cormier [M54.9] Back pain     7/24/2025 12:18 AM Mukesh Cormier [R07.89] Atypical chest pain     7/24/2025 12:19 AM Mukesh Cormier [J02.9] Pharyngitis     7/24/2025  1:12 AM Mukesh Cormier [R09.1] Pleurisy           ED Disposition       ED Disposition   Discharge    Condition   Stable    Date/Time   u Jul 24, 2025  1:11 AM    Comment   Augustin Pineda discharge to home/self care.                   Assessment & Plan       Medical Decision Making  Medical complexity: 45-year-old male presenting to the emergency department today with pain in his back that radiates across his bilateral anterior chest.  He has a very faint, scant end expiratory wheeze, given his long smoking history I suspect likely undiagnosed obstructive airway pathology.  This would not likely explain his pain symptoms today.  Given the chest pain, will need rule out for ACS/MI with serum troponin and serial EKG.  Given his ongoing cough and infectious symptoms, I wonder if he may have flu or COVID, will screen viral panel.  Will screen blood work for signs of superimposed bacterial infection.  Will screen chest x-ray for any sign of focal infiltrate, pneumothorax, pleural effusion.  I considered the possibility of a pulmonary embolus in this patient however given his age and lack of risk factors as well as his normal vital signs, he meets PE rule out criteria and therefore the risk of further workup for pulmonary embolus would outweigh the benefit of such workup.  Will defer at this time.    Reassessment/disposition: No acute abnormalities of concern as evidenced by normal troponin level, no ECG changes, stable ECG 2 hours delta, and stable  imaging of the chest abdomen and pelvis.  Given the patient's symptoms, I feel it is likely he has pleurisy from recent URI.  No evidence of any pericarditis or myocarditis, no focal infiltrates evidenced on CT scan today.  Patient will treat his symptoms at home with NSAIDs, monitor his breathing closely and come back to the emergency department if he feels significant shortness of breath, or other abnormal chest pains.  Patient did have 1 single run of nonsustained ventricular tachycardia with approximately 8 beats of V. tach while here in the emergency department.  He was mildly symptomatic during this event and states that he feels that same sensation fairly regularly at home, but estimates that it only occurs maybe once daily or so.  Would benefit from cardiology evaluation, possible Zio patch or Holter monitor, and discussion about whether or not he needs medication to prevent this NSVT from occurring frequently.  Otherwise, patient stable for discharge at this time with instruction to come back to the emergency department if his symptoms are occurring frequently, or if his sensation of shortness of breath is worsening.  At time of discharge he was in no acute distress with slightly bradycardic rhythm but otherwise normal vital signs.    Amount and/or Complexity of Data Reviewed  Labs: ordered. Decision-making details documented in ED Course.  Radiology: ordered.    Risk  Prescription drug management.        ED Course as of 07/25/25 0052 Wed Jul 23, 2025 2219 ECG interpreted by myself.  Date: 7/23/2025.  Time: 2206.  Rate: 62 bpm.  Axis: Normal.  Rhythm: Regular.  There are no ST elevations or depressions evident on this ECG.  I see no repolarization abnormalities identified on this ECG.  No pathologic Q waves present on this ECG.  Intervals are within normal limits.  Interpretation: This is a normal ECG with normal sinus rhythm without any acute ischemic change when compared to prior from 7/21/2023.    2224 On telemetry monitoring, patient had brief run of nonsustained V. tach approximately 8 beats, he was symptomatic during this period and felt palpitations.   2256 Phosphorus: 4.1   2256 MAGNESIUM: 2.0   2257 Potassium: 3.7   2317 Prior stress echo reviewed from 11/28/2023.  At that time, patient was able to reach full exercise capacity at about 13 minutes and 29 seconds.  Negative stress EKG for ischemia noted.  LVEF was 65% with normal wall motion, no diastolic dysfunction noted.       Medications   ketorolac (TORADOL) injection 15 mg (15 mg Intravenous Given 7/23/25 2314)       ED Risk Strat Scores   HEART Risk Score      Flowsheet Row Most Recent Value   Heart Score Risk Calculator    History 0 Filed at: 07/24/2025 0050   ECG 0 Filed at: 07/24/2025 0050   Age 0 Filed at: 07/24/2025 0050   Risk Factors 1 Filed at: 07/24/2025 0050   Troponin 0 Filed at: 07/24/2025 0050   HEART Score 1 Filed at: 07/24/2025 0050          HEART Risk Score      Flowsheet Row Most Recent Value   Heart Score Risk Calculator    History 0 Filed at: 07/24/2025 0050   ECG 0 Filed at: 07/24/2025 0050   Age 0 Filed at: 07/24/2025 0050   Risk Factors 1 Filed at: 07/24/2025 0050   Troponin 0 Filed at: 07/24/2025 0050   HEART Score 1 Filed at: 07/24/2025 0050                      No data recorded                            History of Present Illness       Chief Complaint   Patient presents with    Back Pain     Pt reports cold like symptoms over the last couple days (sore throat, cough). Pt reports that today he began with sharp mid back pain that is radiating into his rib cage on both sides. Pain worse when inhaling.        Past Medical History[1]   Past Surgical History[2]   Family History[3]   Social History[4]   E-Cigarette/Vaping    E-Cigarette Use Never User       E-Cigarette/Vaping Substances    Nicotine Yes     THC No     CBD No     Flavoring No     Other No     Unknown No       I have reviewed and agree with the history as  "documented.     This is a 45-year-old male who presents to the emergency department today with pain in his back and chest as well as several days of sore throat, mild cough, body aches, and diarrhea.  Patient states that his symptoms get about 2 days ago with a sore throat and mild cough.  He denies any productive nature to his cough and states that his cough is \"mild\".  However he notes that today throughout the day he has been developing worsening pain in his bilateral scapular area that radiates around to his bilateral costophrenic angles towards his anterior chest.  He states that the pain is made worse with deep inspiration and position changes.  When asked whether he feels \"short of breath\", the patient states he finds it difficult to evaluate to this as he is having so much pain when he takes a deep breath that it is hard to say if it is difficult to breathe or chest painful when he breathes.  He does state that he feels winded whenever he exerts himself.  He denies any known cardiopulmonary diagnoses.  He has a daily cigarette smoker with approximately 1 pack/day history for the last 20+ years.        Review of Systems   Constitutional:  Positive for chills. Negative for fatigue and fever.   HENT:  Positive for rhinorrhea and sore throat. Negative for congestion.    Eyes:  Negative for pain and visual disturbance.   Respiratory:  Positive for cough, chest tightness and shortness of breath.    Cardiovascular:  Positive for chest pain. Negative for palpitations.   Gastrointestinal:  Positive for diarrhea. Negative for abdominal pain, blood in stool, constipation, nausea and vomiting.   Genitourinary:  Negative for dysuria, flank pain and hematuria.   Musculoskeletal:  Positive for back pain. Negative for arthralgias and neck pain.   Skin:  Negative for color change and rash.   Neurological:  Negative for dizziness, syncope and light-headedness.   Hematological:  Negative for adenopathy. Does not bruise/bleed " easily.   All other systems reviewed and are negative.          Objective       ED Triage Vitals [07/23/25 2204]   Temperature Pulse Blood Pressure Respirations SpO2 Patient Position - Orthostatic VS   97.7 °F (36.5 °C) 63 146/83 16 98 % Sitting      Temp Source Heart Rate Source BP Location FiO2 (%) Pain Score    Temporal Monitor Left arm -- 8      Vitals      Date and Time Temp Pulse SpO2 Resp BP Pain Score FACES Pain Rating User   07/24/25 0113 -- 54 96 % 17 -- -- --    07/24/25 0058 97.6 °F (36.4 °C) 49 95 % 17 107/65 4 --    07/24/25 0030 -- 58 95 % 17 124/81 -- -- CD   07/24/25 0013 -- 52 95 % 15 137/81 -- --    07/23/25 2314 -- -- -- -- -- 8 --    07/23/25 2228 -- 62 98 % 18 145/90 -- --    07/23/25 2204 97.7 °F (36.5 °C) 63 98 % 16 146/83 8 -- CD            Physical Exam  Vitals and nursing note reviewed.   Constitutional:       General: He is not in acute distress.     Appearance: He is well-developed and normal weight. He is not toxic-appearing or diaphoretic.   HENT:      Head: Normocephalic and atraumatic.      Right Ear: External ear normal.      Left Ear: External ear normal.      Nose: Nose normal. No congestion or rhinorrhea.      Mouth/Throat:      Mouth: Mucous membranes are moist.      Pharynx: No oropharyngeal exudate or posterior oropharyngeal erythema.     Eyes:      General: No scleral icterus.     Extraocular Movements: Extraocular movements intact.      Conjunctiva/sclera: Conjunctivae normal.      Pupils: Pupils are equal, round, and reactive to light.       Cardiovascular:      Rate and Rhythm: Normal rate and regular rhythm.      Pulses: Normal pulses.      Heart sounds: No murmur heard.  Pulmonary:      Effort: Pulmonary effort is normal. No respiratory distress.      Breath sounds: Wheezing (Scant end expiratory) present. No rales.   Abdominal:      Palpations: Abdomen is soft. There is no mass.      Tenderness: There is no abdominal tenderness. There is no right CVA  tenderness, left CVA tenderness or guarding.      Hernia: No hernia is present.     Musculoskeletal:         General: No swelling. Normal range of motion.      Cervical back: Normal range of motion and neck supple.      Right lower leg: No edema.      Left lower leg: No edema.     Skin:     General: Skin is warm and dry.      Capillary Refill: Capillary refill takes less than 2 seconds.     Neurological:      General: No focal deficit present.      Mental Status: He is alert and oriented to person, place, and time.     Psychiatric:         Mood and Affect: Mood normal.         Behavior: Behavior normal.         Results Reviewed       Procedure Component Value Units Date/Time    HS Troponin I 2hr [[id number]]  (Normal) Collected: 07/24/25 0036    Lab Status: Final result Specimen: Blood from Arm, Right Updated: 07/24/25 0109     hs TnI 2hr 5 ng/L      Delta 2hr hsTnI 0 ng/L     HS Troponin 0hr (reflex protocol) [[id number]]  (Normal) Collected: 07/23/25 2227    Lab Status: Final result Specimen: Blood from Arm, Right Updated: 07/23/25 2302     hs TnI 0hr 5 ng/L     FLU/COVID Rapid Antigen (30 min. TAT) - Preferred screening test in ED [[id number]]  (Normal) Collected: 07/23/25 2227    Lab Status: Final result Specimen: Nares from Nose Updated: 07/23/25 2301     SARS COV Rapid Antigen Negative     Influenza A Rapid Antigen Negative     Influenza B Rapid Antigen Negative    Narrative:      This test has been performed using the [employer] Ana 2 FLU+SARS Antigen test under the Emergency Use Authorization (EUA). This test has been validated by the  and verified by the performing laboratory. The Ana uses lateral flow immunofluorescent sandwich assay to detect SARS-COV, Influenza A and Influenza B Antigen.     The [employer] Ana 2 SARS Antigen test does not differentiate between SARS-CoV and SARS-CoV-2.     Negative results are presumptive and may be confirmed with a molecular assay, if necessary, for patient  management. Negative results do not rule out SARS-CoV-2 or influenza infection and should not be used as the sole basis for treatment or patient management decisions. A negative test result may occur if the level of antigen in a sample is below the limit of detection of this test.     Positive results are indicative of the presence of viral antigens, but do not rule out bacterial infection or co-infection with other viruses.     All test results should be used as an adjunct to clinical observations and other information available to the provider.    FOR PEDIATRIC PATIENTS - copy/paste COVID Guidelines URL to browser: [email or web address]    B-Type Natriuretic Peptide(BNP) [[id number]]  (Normal) Collected: 07/23/25 2227    Lab Status: Final result Specimen: Blood from Arm, Right Updated: 07/23/25 2300     BNP 12 pg/mL     Comprehensive metabolic panel [[id number]] Collected: 07/23/25 2227    Lab Status: Final result Specimen: Blood from Arm, Right Updated: 07/23/25 2257     Sodium 138 mmol/L      Potassium 3.7 mmol/L      Chloride 104 mmol/L      CO2 24 mmol/L      ANION GAP 10 mmol/L      BUN 14 mg/dL      Creatinine 0.84 mg/dL      Glucose 71 mg/dL      Calcium 9.4 mg/dL      AST 16 U/L      ALT 14 U/L      Alkaline Phosphatase 74 U/L      Total Protein 7.2 g/dL      Albumin 4.5 g/dL      Total Bilirubin 0.35 mg/dL      eGFR 107 ml/min/1.73sq m     Narrative:      National Kid[employer] guidelines for Chronic Kidney Disease (CKD):     Stage 1 with normal or high GFR (GFR > 90 mL/min/1.73 square meters)    Stage 2 Mild CKD (GFR = 60-89 mL/min/1.73 square meters)    Stage 3A Moderate CKD (GFR = 45-59 mL/min/1.73 square meters)    Stage 3B Moderate CKD (GFR = 30-44 mL/min/1.73 square meters)    Stage 4 Severe CKD (GFR = 15-29 mL/min/1.73 square meters)    Stage 5 End Stage CKD (GFR <15 mL/min/1.73 square meters)  Note: GFR calculation is accurate only  with a steady state creatinine    Phosphorus [[id number]]  (Normal) Collected: 07/23/25 2227    Lab Status: Final result Specimen: Blood from Arm, Right Updated: 07/23/25 2255     Phosphorus 4.1 mg/dL     Magnesium [[id number]]  (Normal) Collected: 07/23/25 2227    Lab Status: Final result Specimen: Blood from Arm, Right Updated: 07/23/25 2255     Magnesium 2.0 mg/dL     CBC and differential [[id number]]  (Abnormal) Collected: 07/23/25 2227    Lab Status: Final result Specimen: Blood from Arm, Right Updated: 07/23/25 2239     WBC 6.27 Thousand/uL      RBC 4.87 Million/uL      Hemoglobin 14.9 g/dL      Hematocrit 43.7 %      MCV 90 fL      MCH 30.6 pg      MCHC 34.1 g/dL      RDW 11.9 %      MPV 10.4 fL      Platelets 207 Thousands/uL      nRBC 0 /100 WBCs      Segmented % 39 %      Immature Grans % 0 %      Lymphocytes % 45 %      Monocytes % 13 %      Eosinophils Relative 2 %      Basophils Relative 1 %      Absolute Neutrophils 2.46 Thousands/µL      Absolute Immature Grans 0.02 Thousand/uL      Absolute Lymphocytes 2.84 Thousands/µL      Absolute Monocytes 0.80 Thousand/µL      Eosinophils Absolute 0.12 Thousand/µL      Basophils Absolute 0.03 Thousands/µL             CT chest without contrast   Final Interpretation by Luciano Riley MD (07/24 0026)      No evidence of acute intrathoracic pathology            Computerized Assisted Algorithm (CAA) may have aided analysis of applicable images.      Workstation performed: [id number]         XR chest pa and lateral   Final Interpretation by Washington Navarrete MD (07/24 0135)      No acute cardiopulmonary disease.            Workstation performed: [id number]             Procedures    ED Medication and Procedure Management   Prior to Admission Medications   Prescriptions Last Dose Informant Patient Reported? Taking?   ketorolac (TORADOL) 10 mg tablet Not Taking  No No   Sig: Take 1 tablet (10 mg total) by mouth every 6 (six) hours as needed for moderate pain or severe pain    Patient not taking: Reported on 7/23/2025   ondansetron (ZOFRAN-ODT) 4 mg disintegrating tablet Not Taking  No No   Sig: Take 1 tablet (4 mg total) by mouth every 6 (six) hours as needed for vomiting or nausea   Patient not taking: Reported on 7/23/2025      Facility-Administered Medications: None     Discharge Medication List as of 7/24/2025  1:13 AM        START taking these medications    Details   naproxen (Naprosyn) 500 mg tablet Take 1 tablet (500 mg total) by mouth 2 (two) times a day with meals, Starting Thu 7/24/2025, Normal           CONTINUE these medications which have NOT CHANGED    Details   ketorolac (TORADOL) 10 mg tablet Take 1 tablet (10 mg total) by mouth every 6 (six) hours as needed for moderate pain or severe pain, Starting Mon 8/26/2024, Normal      ondansetron (ZOFRAN-ODT) 4 mg disintegrating tablet Take 1 tablet (4 mg total) by mouth every 6 (six) hours as needed for vomiting or nausea, Starting Mon 8/26/2024, Normal             ED SEPSIS DOCUMENTATION   Time reflects when diagnosis was documented in both MDM as applicable and the Disposition within this note       Time User Action Codes Description Comment    7/24/2025 12:18 AM Mukesh Cormier [I47.29] NSVT (nonsustained ventricular tachycardia) (HCC)     7/24/2025 12:18 AM Mukesh Cormier [M54.9] Back pain     7/24/2025 12:18 AM Mukesh Cormier [R07.89] Atypical chest pain     7/24/2025 12:19 AM Mukesh Cormier [J02.9] Pharyngitis     7/24/2025  1:12 AM Mukesh Cormier [R09.1] Pleurisy                    [1]   Past Medical History:  Diagnosis Date    Sepsis (HCC)    [2]   Past Surgical History:  Procedure Laterality Date    CYST REMOVAL      from tailbone    NO PAST SURGERIES     [3]   Family History  Problem Relation Name Age of Onset    Hypertension Father Augustin     Heart disease Father Augustin    [4]   Social History  Tobacco Use    Smoking status: Every Day     Current packs/day: 1.00     Types: Cigarettes     Smokeless tobacco: Never   Vaping Use    Vaping status: Never Used   Substance Use Topics    Alcohol use: Not Currently     Comment: socially    Drug use: No        Mukesh Cormier MD  07/25/25 0052

## 2025-07-25 LAB
ATRIAL RATE: 50 BPM
ATRIAL RATE: 62 BPM
P AXIS: 63 DEGREES
P AXIS: 72 DEGREES
PR INTERVAL: 170 MS
PR INTERVAL: 178 MS
QRS AXIS: 40 DEGREES
QRS AXIS: 56 DEGREES
QRSD INTERVAL: 96 MS
QRSD INTERVAL: 96 MS
QT INTERVAL: 412 MS
QT INTERVAL: 442 MS
QTC INTERVAL: 402 MS
QTC INTERVAL: 418 MS
T WAVE AXIS: 54 DEGREES
T WAVE AXIS: 54 DEGREES
VENTRICULAR RATE: 50 BPM
VENTRICULAR RATE: 62 BPM

## 2025-07-25 PROCEDURE — 93010 ELECTROCARDIOGRAM REPORT: CPT | Performed by: INTERNAL MEDICINE
